# Patient Record
Sex: MALE | Race: WHITE | Employment: OTHER | URBAN - METROPOLITAN AREA
[De-identification: names, ages, dates, MRNs, and addresses within clinical notes are randomized per-mention and may not be internally consistent; named-entity substitution may affect disease eponyms.]

---

## 2017-01-20 PROBLEM — R53.83 FATIGUE: Status: ACTIVE | Noted: 2017-01-20

## 2017-01-23 PROBLEM — Z86.718 HISTORY OF DVT (DEEP VEIN THROMBOSIS): Chronic | Status: ACTIVE | Noted: 2017-01-23

## 2017-01-23 PROBLEM — R31.9 HEMATURIA: Status: ACTIVE | Noted: 2017-01-23

## 2017-02-15 PROBLEM — Z92.89 HISTORY OF ECHOCARDIOGRAM: Status: ACTIVE | Noted: 2017-02-15

## 2017-07-21 PROBLEM — Z79.01 CHRONIC ANTICOAGULATION: Status: ACTIVE | Noted: 2017-07-21

## 2017-07-21 PROBLEM — I48.91 ATRIAL FIBRILLATION (HCC): Status: ACTIVE | Noted: 2017-07-21

## 2017-07-24 PROBLEM — I48.19 PERSISTENT ATRIAL FIBRILLATION (HCC): Status: ACTIVE | Noted: 2017-07-21

## 2018-04-10 ENCOUNTER — OFFICE VISIT (OUTPATIENT)
Dept: CARDIOLOGY CLINIC | Age: 83
End: 2018-04-10
Payer: MEDICARE

## 2018-04-10 VITALS
RESPIRATION RATE: 14 BRPM | DIASTOLIC BLOOD PRESSURE: 80 MMHG | BODY MASS INDEX: 32.37 KG/M2 | SYSTOLIC BLOOD PRESSURE: 118 MMHG | HEART RATE: 74 BPM | WEIGHT: 239 LBS | HEIGHT: 72 IN

## 2018-04-10 DIAGNOSIS — I49.5 SICK SINUS SYNDROME (HCC): Primary | ICD-10-CM

## 2018-04-10 PROCEDURE — 93000 ELECTROCARDIOGRAM COMPLETE: CPT | Performed by: INTERNAL MEDICINE

## 2018-04-10 PROCEDURE — 99214 OFFICE O/P EST MOD 30 MIN: CPT | Performed by: INTERNAL MEDICINE

## 2018-04-10 RX ORDER — FESOTERODINE FUMARATE 4 MG/1
4 TABLET, EXTENDED RELEASE ORAL DAILY
COMMUNITY
End: 2019-06-04

## 2018-11-09 ENCOUNTER — OFFICE VISIT (OUTPATIENT)
Dept: CARDIOLOGY CLINIC | Age: 83
End: 2018-11-09
Payer: MEDICARE

## 2018-11-09 VITALS
HEIGHT: 73 IN | RESPIRATION RATE: 16 BRPM | WEIGHT: 235 LBS | SYSTOLIC BLOOD PRESSURE: 110 MMHG | HEART RATE: 64 BPM | DIASTOLIC BLOOD PRESSURE: 68 MMHG | BODY MASS INDEX: 31.14 KG/M2

## 2018-11-09 DIAGNOSIS — I25.10 CORONARY ARTERY DISEASE INVOLVING NATIVE CORONARY ARTERY OF NATIVE HEART WITHOUT ANGINA PECTORIS: Primary | ICD-10-CM

## 2018-11-09 PROCEDURE — 93000 ELECTROCARDIOGRAM COMPLETE: CPT | Performed by: INTERNAL MEDICINE

## 2018-11-09 PROCEDURE — 99214 OFFICE O/P EST MOD 30 MIN: CPT | Performed by: INTERNAL MEDICINE

## 2018-11-09 NOTE — PROGRESS NOTES
Subjective:      Patient ID: Kia Chacon is a 80 y.o. male. HPI:  See subjective below:      Chief Complaint   Patient presents with    Coronary Artery Disease       Patient Active Problem List    Diagnosis Date Noted    Persistent atrial fibrillation ([facility] [address] 07/21/2017     Overview Note:     MODIFIED MAZE WITH BILATERAL PV ISOLATION, 15M ATRICLIP      Chronic anticoagulation 07/21/2017    History of echocardiogram 02/15/2017     Overview Note:     A. Echo 1/2017 [facility]):  EF 35-45%, moderate LVH, mild AI      History of DVT (deep vein thrombosis) 01/23/2017    Hyperlipidemia with target LDL less than 100 05/31/2013     Overview Note:     replace inactive diagnosis      Essential hypertension 05/31/2013    Diabetes mellitus ([facility] [address] 05/31/2013    Single kidney 05/31/2013    Coronary artery disease involving native coronary artery of native heart 05/31/2013     Overview Note:     A. PCI 2003 - PTCA of LAD 2004 - [place]  B. ACB 7/24/2017:  LIMA-LAD,CRYOVEIN TO PL., CIRCUMFLEX WITH HIGH GRADE LESION AFTER HUGE OM1, OM2 NOT GRAFTED         Current Outpatient Prescriptions   Medication Sig Dispense Refill    tamsulosin (FLOMAX) 0.4 MG capsule Take 0.4 mg by mouth daily       ferrous sulfate 325 (65 Fe) MG tablet Take 325 mg by mouth daily (with breakfast)      metoprolol succinate (TOPROL XL) 25 MG extended release tablet Take 1 tablet by mouth daily 30 tablet 3    warfarin (COUMADIN) 2.5 MG tablet Take 1 tablet by mouth See Admin Instructions Take daily based on daily INR results. Dose to be determined on daily basis by facility medical provider/physician. Check and dose daily until consistently therapeutic. Dx Hx DVTs, Afib.   Goal INR 2-3 30 tablet 3    Multiple Vitamins-Minerals (OCUVITE PO) Take 250 mg by mouth daily      glipiZIDE (GLUCOTROL XL) 5 MG extended release tablet Take 5 mg by mouth daily      PROCTOZONE-HC 2.5 % rectal cream Place 1 each rectally daily as needed for Hemorrhoids      

## 2018-12-17 ENCOUNTER — HOSPITAL ENCOUNTER (OUTPATIENT)
Age: 83
Discharge: HOME | End: 2018-12-19
Payer: MEDICARE

## 2018-12-17 PROCEDURE — 87075 CULTR BACTERIA EXCEPT BLOOD: CPT

## 2018-12-17 PROCEDURE — 87070 CULTURE OTHR SPECIMN AEROBIC: CPT

## 2018-12-19 LAB — WOUND/ABSCESS: NORMAL

## 2018-12-21 LAB — ANAEROBIC CULTURE: NORMAL

## 2019-01-27 ENCOUNTER — HOSPITAL ENCOUNTER (OUTPATIENT)
Age: 84
Discharge: HOME | End: 2019-01-27
Payer: MEDICARE

## 2019-01-27 ENCOUNTER — APPOINTMENT (OUTPATIENT)
Dept: CT IMAGING | Age: 84
End: 2019-01-27
Payer: MEDICARE

## 2019-01-27 ENCOUNTER — HOSPITAL ENCOUNTER (INPATIENT)
Age: 84
LOS: 4 days | Discharge: NURSING, REHAB OR LONG-TERM CARE | DRG: 064 | End: 2019-01-31
Attending: INTERNAL MEDICINE | Admitting: INTERNAL MEDICINE
Payer: MEDICARE

## 2019-01-27 ENCOUNTER — APPOINTMENT (OUTPATIENT)
Dept: GENERAL RADIOLOGY | Age: 84
End: 2019-01-27
Payer: MEDICARE

## 2019-01-27 ENCOUNTER — HOSPITAL ENCOUNTER (EMERGENCY)
Age: 84
Discharge: ANOTHER HOSPITAL | End: 2019-01-27
Attending: EMERGENCY MEDICINE
Payer: MEDICARE

## 2019-01-27 VITALS
SYSTOLIC BLOOD PRESSURE: 136 MMHG | HEART RATE: 42 BPM | TEMPERATURE: 97.6 F | OXYGEN SATURATION: 94 % | RESPIRATION RATE: 16 BRPM | BODY MASS INDEX: 30.48 KG/M2 | HEIGHT: 73 IN | WEIGHT: 230 LBS | DIASTOLIC BLOOD PRESSURE: 63 MMHG

## 2019-01-27 DIAGNOSIS — I77.74 VERTEBRAL ARTERY DISSECTION (HCC): Primary | ICD-10-CM

## 2019-01-27 DIAGNOSIS — R29.90 STROKE-LIKE SYMPTOMS: ICD-10-CM

## 2019-01-27 PROBLEM — I63.9 CEREBROVASCULAR ACCIDENT (CVA) (HCC): Status: ACTIVE | Noted: 2019-01-27

## 2019-01-27 LAB
ALBUMIN SERPL-MCNC: 4.4 G/DL (ref 3.5–5.2)
ALP BLD-CCNC: 37 U/L (ref 40–129)
ALT SERPL-CCNC: 24 U/L (ref 0–40)
ANION GAP SERPL CALCULATED.3IONS-SCNC: 13 MMOL/L (ref 7–16)
APTT: 29.2 SEC (ref 24.5–35.1)
AST SERPL-CCNC: 33 U/L (ref 0–39)
BASOPHILS ABSOLUTE: 0.03 E9/L (ref 0–0.2)
BASOPHILS RELATIVE PERCENT: 0.5 % (ref 0–2)
BILIRUB SERPL-MCNC: 1 MG/DL (ref 0–1.2)
BILIRUBIN DIRECT: 0.3 MG/DL (ref 0–0.3)
BILIRUBIN URINE: NEGATIVE
BILIRUBIN, INDIRECT: 0.7 MG/DL (ref 0–1)
BLOOD, URINE: NEGATIVE
BUN BLDV-MCNC: 22 MG/DL (ref 8–23)
CALCIUM SERPL-MCNC: 9.3 MG/DL (ref 8.6–10.2)
CHLORIDE BLD-SCNC: 102 MMOL/L (ref 98–107)
CLARITY: CLEAR
CO2: 23 MMOL/L (ref 22–29)
COLOR: YELLOW
CREAT SERPL-MCNC: 1.2 MG/DL (ref 0.7–1.2)
EKG ATRIAL RATE: 54 BPM
EKG P AXIS: 48 DEGREES
EKG P-R INTERVAL: 222 MS
EKG Q-T INTERVAL: 482 MS
EKG QRS DURATION: 96 MS
EKG QTC CALCULATION (BAZETT): 457 MS
EKG R AXIS: 39 DEGREES
EKG T AXIS: 28 DEGREES
EKG VENTRICULAR RATE: 54 BPM
EOSINOPHILS ABSOLUTE: 0.27 E9/L (ref 0.05–0.5)
EOSINOPHILS RELATIVE PERCENT: 4.8 % (ref 0–6)
GFR AFRICAN AMERICAN: >60
GFR NON-AFRICAN AMERICAN: 58 ML/MIN/1.73
GLUCOSE BLD-MCNC: 180 MG/DL (ref 74–99)
GLUCOSE URINE: NEGATIVE MG/DL
HCT VFR BLD CALC: 40.6 % (ref 37–54)
HEMOGLOBIN: 13.4 G/DL (ref 12.5–16.5)
IMMATURE GRANULOCYTES #: 0.01 E9/L
IMMATURE GRANULOCYTES %: 0.2 % (ref 0–5)
INR BLD: 1.8
INR BLD: 1.9
KETONES, URINE: 15 MG/DL
LACTIC ACID: 2.2 MMOL/L (ref 0.5–2.2)
LEUKOCYTE ESTERASE, URINE: NEGATIVE
LIPASE: 20 U/L (ref 13–60)
LYMPHOCYTES ABSOLUTE: 1.64 E9/L (ref 1.5–4)
LYMPHOCYTES RELATIVE PERCENT: 29.2 % (ref 20–42)
MCH RBC QN AUTO: 31.1 PG (ref 26–35)
MCHC RBC AUTO-ENTMCNC: 33 % (ref 32–34.5)
MCV RBC AUTO: 94.2 FL (ref 80–99.9)
METER GLUCOSE: 141 MG/DL (ref 74–99)
METER GLUCOSE: 240 MG/DL (ref 74–99)
MONOCYTES ABSOLUTE: 0.44 E9/L (ref 0.1–0.95)
MONOCYTES RELATIVE PERCENT: 7.8 % (ref 2–12)
NEUTROPHILS ABSOLUTE: 3.22 E9/L (ref 1.8–7.3)
NEUTROPHILS RELATIVE PERCENT: 57.5 % (ref 43–80)
NITRITE, URINE: NEGATIVE
PDW BLD-RTO: 14.6 FL (ref 11.5–15)
PH UA: 5.5 (ref 5–9)
PLATELET # BLD: 137 E9/L (ref 130–450)
PMV BLD AUTO: 9.9 FL (ref 7–12)
POTASSIUM REFLEX MAGNESIUM: 4.8 MMOL/L (ref 3.5–5)
PROTEIN UA: NEGATIVE MG/DL
PROTHROMBIN TIME: 20.3 SEC (ref 9.3–12.4)
PROTHROMBIN TIME: 21.6 SEC (ref 9.3–12.4)
RBC # BLD: 4.31 E12/L (ref 3.8–5.8)
SODIUM BLD-SCNC: 138 MMOL/L (ref 132–146)
SPECIFIC GRAVITY UA: 1.02 (ref 1–1.03)
TOTAL PROTEIN: 7.3 G/DL (ref 6.4–8.3)
TROPONIN: <0.01 NG/ML (ref 0–0.03)
TSH SERPL DL<=0.05 MIU/L-ACNC: 2.67 UIU/ML (ref 0.27–4.2)
UROBILINOGEN, URINE: 0.2 E.U./DL
WBC # BLD: 5.6 E9/L (ref 4.5–11.5)

## 2019-01-27 PROCEDURE — 51702 INSERT TEMP BLADDER CATH: CPT

## 2019-01-27 PROCEDURE — 36415 COLL VENOUS BLD VENIPUNCTURE: CPT

## 2019-01-27 PROCEDURE — 84443 ASSAY THYROID STIM HORMONE: CPT

## 2019-01-27 PROCEDURE — 70498 CT ANGIOGRAPHY NECK: CPT

## 2019-01-27 PROCEDURE — 6360000002 HC RX W HCPCS: Performed by: INTERNAL MEDICINE

## 2019-01-27 PROCEDURE — 80048 BASIC METABOLIC PNL TOTAL CA: CPT

## 2019-01-27 PROCEDURE — 2580000003 HC RX 258: Performed by: INTERNAL MEDICINE

## 2019-01-27 PROCEDURE — 99222 1ST HOSP IP/OBS MODERATE 55: CPT | Performed by: PSYCHIATRY & NEUROLOGY

## 2019-01-27 PROCEDURE — 6360000002 HC RX W HCPCS: Performed by: EMERGENCY MEDICINE

## 2019-01-27 PROCEDURE — A0425 GROUND MILEAGE: HCPCS

## 2019-01-27 PROCEDURE — 85610 PROTHROMBIN TIME: CPT

## 2019-01-27 PROCEDURE — 2000000000 HC ICU R&B

## 2019-01-27 PROCEDURE — 71045 X-RAY EXAM CHEST 1 VIEW: CPT

## 2019-01-27 PROCEDURE — 96374 THER/PROPH/DIAG INJ IV PUSH: CPT

## 2019-01-27 PROCEDURE — 82962 GLUCOSE BLOOD TEST: CPT

## 2019-01-27 PROCEDURE — 6360000002 HC RX W HCPCS

## 2019-01-27 PROCEDURE — A0426 ALS 1: HCPCS

## 2019-01-27 PROCEDURE — 81003 URINALYSIS AUTO W/O SCOPE: CPT

## 2019-01-27 PROCEDURE — 83690 ASSAY OF LIPASE: CPT

## 2019-01-27 PROCEDURE — 70496 CT ANGIOGRAPHY HEAD: CPT

## 2019-01-27 PROCEDURE — 85025 COMPLETE CBC W/AUTO DIFF WBC: CPT

## 2019-01-27 PROCEDURE — 2580000003 HC RX 258: Performed by: EMERGENCY MEDICINE

## 2019-01-27 PROCEDURE — 0042T CT BRAIN PERFUSION: CPT

## 2019-01-27 PROCEDURE — 84484 ASSAY OF TROPONIN QUANT: CPT

## 2019-01-27 PROCEDURE — 6370000000 HC RX 637 (ALT 250 FOR IP): Performed by: EMERGENCY MEDICINE

## 2019-01-27 PROCEDURE — 80076 HEPATIC FUNCTION PANEL: CPT

## 2019-01-27 PROCEDURE — 85730 THROMBOPLASTIN TIME PARTIAL: CPT

## 2019-01-27 PROCEDURE — 99285 EMERGENCY DEPT VISIT HI MDM: CPT

## 2019-01-27 PROCEDURE — 93005 ELECTROCARDIOGRAM TRACING: CPT | Performed by: EMERGENCY MEDICINE

## 2019-01-27 PROCEDURE — 70450 CT HEAD/BRAIN W/O DYE: CPT

## 2019-01-27 PROCEDURE — 99291 CRITICAL CARE FIRST HOUR: CPT | Performed by: SURGERY

## 2019-01-27 PROCEDURE — 83605 ASSAY OF LACTIC ACID: CPT

## 2019-01-27 PROCEDURE — 6360000004 HC RX CONTRAST MEDICATION: Performed by: RADIOLOGY

## 2019-01-27 RX ORDER — ASPIRIN 300 MG/1
300 SUPPOSITORY RECTAL ONCE
Status: COMPLETED | OUTPATIENT
Start: 2019-01-27 | End: 2019-01-27

## 2019-01-27 RX ORDER — ONDANSETRON 2 MG/ML
4 INJECTION INTRAMUSCULAR; INTRAVENOUS EVERY 6 HOURS PRN
Status: DISCONTINUED | OUTPATIENT
Start: 2019-01-27 | End: 2019-01-31 | Stop reason: HOSPADM

## 2019-01-27 RX ORDER — ASPIRIN 81 MG/1
81 TABLET, CHEWABLE ORAL DAILY
Status: DISCONTINUED | OUTPATIENT
Start: 2019-01-27 | End: 2019-01-27

## 2019-01-27 RX ORDER — DEXTROSE MONOHYDRATE 25 G/50ML
12.5 INJECTION, SOLUTION INTRAVENOUS PRN
Status: CANCELLED | OUTPATIENT
Start: 2019-01-27

## 2019-01-27 RX ORDER — VITAMIN C
1 TAB ORAL DAILY
Status: DISCONTINUED | OUTPATIENT
Start: 2019-01-27 | End: 2019-01-28

## 2019-01-27 RX ORDER — PANTOPRAZOLE SODIUM 40 MG/1
40 TABLET, DELAYED RELEASE ORAL DAILY
Status: DISCONTINUED | OUTPATIENT
Start: 2019-01-27 | End: 2019-01-28

## 2019-01-27 RX ORDER — SODIUM CHLORIDE 9 MG/ML
INJECTION, SOLUTION INTRAVENOUS CONTINUOUS
Status: DISCONTINUED | OUTPATIENT
Start: 2019-01-27 | End: 2019-01-29

## 2019-01-27 RX ORDER — ONDANSETRON 2 MG/ML
8 INJECTION INTRAMUSCULAR; INTRAVENOUS ONCE
Status: COMPLETED | OUTPATIENT
Start: 2019-01-27 | End: 2019-01-27

## 2019-01-27 RX ORDER — LANOLIN ALCOHOL/MO/W.PET/CERES
400 CREAM (GRAM) TOPICAL DAILY
Status: DISCONTINUED | OUTPATIENT
Start: 2019-01-27 | End: 2019-01-28

## 2019-01-27 RX ORDER — NICOTINE POLACRILEX 4 MG
15 LOZENGE BUCCAL PRN
Status: DISCONTINUED | OUTPATIENT
Start: 2019-01-27 | End: 2019-01-31 | Stop reason: HOSPADM

## 2019-01-27 RX ORDER — DEXTROSE MONOHYDRATE 25 G/50ML
12.5 INJECTION, SOLUTION INTRAVENOUS PRN
Status: DISCONTINUED | OUTPATIENT
Start: 2019-01-27 | End: 2019-01-31 | Stop reason: HOSPADM

## 2019-01-27 RX ORDER — SODIUM CHLORIDE 9 MG/ML
INJECTION, SOLUTION INTRAVENOUS CONTINUOUS
Status: CANCELLED | OUTPATIENT
Start: 2019-01-27

## 2019-01-27 RX ORDER — HEPARIN SODIUM 1000 [USP'U]/ML
30 INJECTION, SOLUTION INTRAVENOUS; SUBCUTANEOUS PRN
Status: DISCONTINUED | OUTPATIENT
Start: 2019-01-27 | End: 2019-01-31 | Stop reason: HOSPADM

## 2019-01-27 RX ORDER — ONDANSETRON 2 MG/ML
4 INJECTION INTRAMUSCULAR; INTRAVENOUS EVERY 6 HOURS PRN
Status: CANCELLED | OUTPATIENT
Start: 2019-01-27

## 2019-01-27 RX ORDER — FERROUS SULFATE 325(65) MG
325 TABLET ORAL
Status: DISCONTINUED | OUTPATIENT
Start: 2019-01-28 | End: 2019-01-28

## 2019-01-27 RX ORDER — ASPIRIN 81 MG/1
324 TABLET, CHEWABLE ORAL ONCE
Status: DISCONTINUED | OUTPATIENT
Start: 2019-01-27 | End: 2019-01-27

## 2019-01-27 RX ORDER — NICOTINE POLACRILEX 4 MG
15 LOZENGE BUCCAL PRN
Status: CANCELLED | OUTPATIENT
Start: 2019-01-27

## 2019-01-27 RX ORDER — ATORVASTATIN CALCIUM 40 MG/1
40 TABLET, FILM COATED ORAL NIGHTLY
Status: CANCELLED | OUTPATIENT
Start: 2019-01-27

## 2019-01-27 RX ORDER — ATORVASTATIN CALCIUM 40 MG/1
40 TABLET, FILM COATED ORAL NIGHTLY
Status: DISCONTINUED | OUTPATIENT
Start: 2019-01-27 | End: 2019-01-28

## 2019-01-27 RX ORDER — SODIUM CHLORIDE 0.9 % (FLUSH) 0.9 %
10 SYRINGE (ML) INJECTION EVERY 12 HOURS SCHEDULED
Status: CANCELLED | OUTPATIENT
Start: 2019-01-27

## 2019-01-27 RX ORDER — DEXTROSE MONOHYDRATE 50 MG/ML
100 INJECTION, SOLUTION INTRAVENOUS PRN
Status: DISCONTINUED | OUTPATIENT
Start: 2019-01-27 | End: 2019-01-31 | Stop reason: HOSPADM

## 2019-01-27 RX ORDER — ENALAPRIL MALEATE 5 MG/1
5 TABLET ORAL DAILY
Status: DISCONTINUED | OUTPATIENT
Start: 2019-01-27 | End: 2019-01-28

## 2019-01-27 RX ORDER — SODIUM CHLORIDE 0.9 % (FLUSH) 0.9 %
10 SYRINGE (ML) INJECTION EVERY 12 HOURS SCHEDULED
Status: DISCONTINUED | OUTPATIENT
Start: 2019-01-27 | End: 2019-01-31 | Stop reason: HOSPADM

## 2019-01-27 RX ORDER — SODIUM CHLORIDE 0.9 % (FLUSH) 0.9 %
10 SYRINGE (ML) INJECTION PRN
Status: CANCELLED | OUTPATIENT
Start: 2019-01-27

## 2019-01-27 RX ORDER — GABAPENTIN 400 MG/1
400 CAPSULE ORAL 3 TIMES DAILY
Status: DISCONTINUED | OUTPATIENT
Start: 2019-01-27 | End: 2019-01-28

## 2019-01-27 RX ORDER — WARFARIN SODIUM 5 MG/1
2.5 TABLET ORAL DAILY
Status: DISCONTINUED | OUTPATIENT
Start: 2019-01-27 | End: 2019-01-28

## 2019-01-27 RX ORDER — ASPIRIN 300 MG/1
300 SUPPOSITORY RECTAL DAILY
Status: DISCONTINUED | OUTPATIENT
Start: 2019-01-28 | End: 2019-01-29

## 2019-01-27 RX ORDER — METOPROLOL SUCCINATE 25 MG/1
25 TABLET, EXTENDED RELEASE ORAL DAILY
Status: DISCONTINUED | OUTPATIENT
Start: 2019-01-27 | End: 2019-01-28

## 2019-01-27 RX ORDER — ASPIRIN 81 MG/1
243 TABLET, CHEWABLE ORAL ONCE
Status: DISCONTINUED | OUTPATIENT
Start: 2019-01-27 | End: 2019-01-27

## 2019-01-27 RX ORDER — HEPARIN SODIUM 10000 [USP'U]/100ML
INJECTION, SOLUTION INTRAVENOUS
Status: COMPLETED
Start: 2019-01-27 | End: 2019-01-27

## 2019-01-27 RX ORDER — DIMETHICONE, OXYBENZONE, AND PADIMATE O 2; 2.5; 6.6 G/100G; G/100G; G/100G
STICK TOPICAL
Status: DISCONTINUED
Start: 2019-01-27 | End: 2019-01-28

## 2019-01-27 RX ORDER — ACETAMINOPHEN 325 MG/1
650 TABLET ORAL EVERY 4 HOURS PRN
Status: CANCELLED | OUTPATIENT
Start: 2019-01-27

## 2019-01-27 RX ORDER — ACETAMINOPHEN 325 MG/1
650 TABLET ORAL EVERY 4 HOURS PRN
Status: DISCONTINUED | OUTPATIENT
Start: 2019-01-27 | End: 2019-01-28

## 2019-01-27 RX ORDER — SODIUM CHLORIDE 0.9 % (FLUSH) 0.9 %
10 SYRINGE (ML) INJECTION PRN
Status: DISCONTINUED | OUTPATIENT
Start: 2019-01-27 | End: 2019-01-31 | Stop reason: HOSPADM

## 2019-01-27 RX ORDER — ASPIRIN 325 MG
325 TABLET ORAL DAILY
Status: CANCELLED | OUTPATIENT
Start: 2019-01-27

## 2019-01-27 RX ORDER — GLIPIZIDE 5 MG/1
5 TABLET ORAL DAILY
Status: DISCONTINUED | OUTPATIENT
Start: 2019-01-27 | End: 2019-01-28

## 2019-01-27 RX ORDER — HEPARIN SODIUM 1000 [USP'U]/ML
60 INJECTION, SOLUTION INTRAVENOUS; SUBCUTANEOUS ONCE
Status: COMPLETED | OUTPATIENT
Start: 2019-01-27 | End: 2019-01-27

## 2019-01-27 RX ORDER — 0.9 % SODIUM CHLORIDE 0.9 %
1000 INTRAVENOUS SOLUTION INTRAVENOUS ONCE
Status: COMPLETED | OUTPATIENT
Start: 2019-01-27 | End: 2019-01-27

## 2019-01-27 RX ORDER — ASPIRIN 325 MG
325 TABLET ORAL DAILY
Status: DISCONTINUED | OUTPATIENT
Start: 2019-01-27 | End: 2019-01-27

## 2019-01-27 RX ORDER — DEXTROSE MONOHYDRATE 50 MG/ML
100 INJECTION, SOLUTION INTRAVENOUS PRN
Status: CANCELLED | OUTPATIENT
Start: 2019-01-27

## 2019-01-27 RX ORDER — POTASSIUM CHLORIDE 750 MG/1
10 TABLET, EXTENDED RELEASE ORAL
Status: DISCONTINUED | OUTPATIENT
Start: 2019-01-27 | End: 2019-01-28

## 2019-01-27 RX ORDER — HEPARIN SODIUM 10000 [USP'U]/100ML
12 INJECTION, SOLUTION INTRAVENOUS CONTINUOUS
Status: DISCONTINUED | OUTPATIENT
Start: 2019-01-27 | End: 2019-01-29

## 2019-01-27 RX ORDER — MECLIZINE HCL 12.5 MG/1
25 TABLET ORAL ONCE
Status: DISCONTINUED | OUTPATIENT
Start: 2019-01-27 | End: 2019-01-27 | Stop reason: HOSPADM

## 2019-01-27 RX ORDER — ASPIRIN 81 MG/1
81 TABLET ORAL DAILY
Status: DISCONTINUED | OUTPATIENT
Start: 2019-01-27 | End: 2019-01-27 | Stop reason: SDUPTHER

## 2019-01-27 RX ORDER — HEPARIN SODIUM 1000 [USP'U]/ML
60 INJECTION, SOLUTION INTRAVENOUS; SUBCUTANEOUS PRN
Status: DISCONTINUED | OUTPATIENT
Start: 2019-01-27 | End: 2019-01-31 | Stop reason: HOSPADM

## 2019-01-27 RX ORDER — TAMSULOSIN HYDROCHLORIDE 0.4 MG/1
0.4 CAPSULE ORAL DAILY
Status: DISCONTINUED | OUTPATIENT
Start: 2019-01-27 | End: 2019-01-28

## 2019-01-27 RX ORDER — TROSPIUM CHLORIDE 20 MG/1
20 TABLET, FILM COATED ORAL DAILY
Status: DISCONTINUED | OUTPATIENT
Start: 2019-01-27 | End: 2019-01-28

## 2019-01-27 RX ADMIN — SODIUM CHLORIDE 1000 ML: 9 INJECTION, SOLUTION INTRAVENOUS at 11:09

## 2019-01-27 RX ADMIN — SODIUM CHLORIDE: 9 INJECTION, SOLUTION INTRAVENOUS at 20:02

## 2019-01-27 RX ADMIN — IOPAMIDOL 100 ML: 755 INJECTION, SOLUTION INTRAVENOUS at 13:13

## 2019-01-27 RX ADMIN — HEPARIN SODIUM AND DEXTROSE 12 UNITS/KG/HR: 10000; 5 INJECTION INTRAVENOUS at 21:09

## 2019-01-27 RX ADMIN — ONDANSETRON 8 MG: 2 INJECTION INTRAMUSCULAR; INTRAVENOUS at 11:21

## 2019-01-27 RX ADMIN — HEPARIN SODIUM 12 UNITS/KG/HR: 10000 INJECTION, SOLUTION INTRAVENOUS at 21:09

## 2019-01-27 RX ADMIN — HEPARIN SODIUM 6420 UNITS: 1000 INJECTION, SOLUTION INTRAVENOUS; SUBCUTANEOUS at 21:05

## 2019-01-27 RX ADMIN — Medication 10 ML: at 20:06

## 2019-01-27 RX ADMIN — ASPIRIN 300 MG: 300 SUPPOSITORY RECTAL at 15:41

## 2019-01-27 ASSESSMENT — ENCOUNTER SYMPTOMS
DIARRHEA: 0
WHEEZING: 0
SHORTNESS OF BREATH: 0
VOMITING: 0
NAUSEA: 1
VISUAL CHANGE: 0
BACK PAIN: 0
CONSTIPATION: 0
ABDOMINAL PAIN: 0
BLOOD IN STOOL: 0
COUGH: 0

## 2019-01-27 ASSESSMENT — PAIN DESCRIPTION - LOCATION: LOCATION: HEAD

## 2019-01-27 ASSESSMENT — PAIN SCALES - GENERAL
PAINLEVEL_OUTOF10: 0

## 2019-01-27 ASSESSMENT — PAIN DESCRIPTION - PAIN TYPE: TYPE: ACUTE PAIN

## 2019-01-27 ASSESSMENT — PAIN DESCRIPTION - FREQUENCY: FREQUENCY: INTERMITTENT

## 2019-01-28 ENCOUNTER — APPOINTMENT (OUTPATIENT)
Dept: GENERAL RADIOLOGY | Age: 84
DRG: 064 | End: 2019-01-28
Attending: INTERNAL MEDICINE
Payer: MEDICARE

## 2019-01-28 ENCOUNTER — APPOINTMENT (OUTPATIENT)
Dept: MRI IMAGING | Age: 84
DRG: 064 | End: 2019-01-28
Attending: INTERNAL MEDICINE
Payer: MEDICARE

## 2019-01-28 PROBLEM — I10 HTN (HYPERTENSION), BENIGN: Chronic | Status: ACTIVE | Noted: 2019-01-28

## 2019-01-28 PROBLEM — I63.219: Status: ACTIVE | Noted: 2019-01-28

## 2019-01-28 PROBLEM — R42 VERTIGO: Status: ACTIVE | Noted: 2019-01-28

## 2019-01-28 PROBLEM — I48.19 PERSISTENT ATRIAL FIBRILLATION (HCC): Chronic | Status: ACTIVE | Noted: 2017-07-21

## 2019-01-28 LAB
ALBUMIN SERPL-MCNC: 3.7 G/DL (ref 3.5–5.2)
ALP BLD-CCNC: 32 U/L (ref 40–129)
ALT SERPL-CCNC: 21 U/L (ref 0–40)
ANION GAP SERPL CALCULATED.3IONS-SCNC: 10 MMOL/L (ref 7–16)
APTT: 53.5 SEC (ref 24.5–35.1)
APTT: 73.6 SEC (ref 24.5–35.1)
APTT: >240 SEC (ref 24.5–35.1)
AST SERPL-CCNC: 27 U/L (ref 0–39)
BASOPHILS ABSOLUTE: 0.02 E9/L (ref 0–0.2)
BASOPHILS RELATIVE PERCENT: 0.3 % (ref 0–2)
BILIRUB SERPL-MCNC: 0.9 MG/DL (ref 0–1.2)
BUN BLDV-MCNC: 21 MG/DL (ref 8–23)
CALCIUM SERPL-MCNC: 9 MG/DL (ref 8.6–10.2)
CHLORIDE BLD-SCNC: 102 MMOL/L (ref 98–107)
CO2: 26 MMOL/L (ref 22–29)
CREAT SERPL-MCNC: 1 MG/DL (ref 0.7–1.2)
EOSINOPHILS ABSOLUTE: 0.05 E9/L (ref 0.05–0.5)
EOSINOPHILS RELATIVE PERCENT: 0.7 % (ref 0–6)
GFR AFRICAN AMERICAN: >60
GFR NON-AFRICAN AMERICAN: >60 ML/MIN/1.73
GLUCOSE BLD-MCNC: 127 MG/DL (ref 74–99)
HCT VFR BLD CALC: 36.3 % (ref 37–54)
HEMOGLOBIN: 12 G/DL (ref 12.5–16.5)
IMMATURE GRANULOCYTES #: 0.02 E9/L
IMMATURE GRANULOCYTES %: 0.3 % (ref 0–5)
INR BLD: 2
LV EF: 60 %
LVEF MODALITY: NORMAL
LYMPHOCYTES ABSOLUTE: 1.23 E9/L (ref 1.5–4)
LYMPHOCYTES RELATIVE PERCENT: 16.2 % (ref 20–42)
MAGNESIUM: 1.7 MG/DL (ref 1.6–2.6)
MCH RBC QN AUTO: 30.8 PG (ref 26–35)
MCHC RBC AUTO-ENTMCNC: 33.1 % (ref 32–34.5)
MCV RBC AUTO: 93.3 FL (ref 80–99.9)
METER GLUCOSE: 113 MG/DL (ref 74–99)
METER GLUCOSE: 120 MG/DL (ref 74–99)
METER GLUCOSE: 137 MG/DL (ref 74–99)
METER GLUCOSE: 139 MG/DL (ref 74–99)
MONOCYTES ABSOLUTE: 0.7 E9/L (ref 0.1–0.95)
MONOCYTES RELATIVE PERCENT: 9.2 % (ref 2–12)
NEUTROPHILS ABSOLUTE: 5.55 E9/L (ref 1.8–7.3)
NEUTROPHILS RELATIVE PERCENT: 73.3 % (ref 43–80)
PDW BLD-RTO: 14.3 FL (ref 11.5–15)
PHOSPHORUS: 2.9 MG/DL (ref 2.5–4.5)
PLATELET # BLD: 145 E9/L (ref 130–450)
PMV BLD AUTO: 10.1 FL (ref 7–12)
POTASSIUM SERPL-SCNC: 4.4 MMOL/L (ref 3.5–5)
PROTHROMBIN TIME: 22.4 SEC (ref 9.3–12.4)
RBC # BLD: 3.89 E12/L (ref 3.8–5.8)
SODIUM BLD-SCNC: 138 MMOL/L (ref 132–146)
TOTAL PROTEIN: 6.3 G/DL (ref 6.4–8.3)
WBC # BLD: 7.6 E9/L (ref 4.5–11.5)

## 2019-01-28 PROCEDURE — 93306 TTE W/DOPPLER COMPLETE: CPT

## 2019-01-28 PROCEDURE — 99233 SBSQ HOSP IP/OBS HIGH 50: CPT | Performed by: NURSE PRACTITIONER

## 2019-01-28 PROCEDURE — 74230 X-RAY XM SWLNG FUNCJ C+: CPT

## 2019-01-28 PROCEDURE — C9113 INJ PANTOPRAZOLE SODIUM, VIA: HCPCS | Performed by: INTERNAL MEDICINE

## 2019-01-28 PROCEDURE — 2500000003 HC RX 250 WO HCPCS: Performed by: INTERNAL MEDICINE

## 2019-01-28 PROCEDURE — 2000000000 HC ICU R&B

## 2019-01-28 PROCEDURE — 85025 COMPLETE CBC W/AUTO DIFF WBC: CPT

## 2019-01-28 PROCEDURE — 92611 MOTION FLUOROSCOPY/SWALLOW: CPT

## 2019-01-28 PROCEDURE — 6370000000 HC RX 637 (ALT 250 FOR IP): Performed by: INTERNAL MEDICINE

## 2019-01-28 PROCEDURE — 36415 COLL VENOUS BLD VENIPUNCTURE: CPT

## 2019-01-28 PROCEDURE — 2700000000 HC OXYGEN THERAPY PER DAY

## 2019-01-28 PROCEDURE — 85610 PROTHROMBIN TIME: CPT

## 2019-01-28 PROCEDURE — 83036 HEMOGLOBIN GLYCOSYLATED A1C: CPT

## 2019-01-28 PROCEDURE — 70551 MRI BRAIN STEM W/O DYE: CPT

## 2019-01-28 PROCEDURE — 83735 ASSAY OF MAGNESIUM: CPT

## 2019-01-28 PROCEDURE — 84100 ASSAY OF PHOSPHORUS: CPT

## 2019-01-28 PROCEDURE — 6360000002 HC RX W HCPCS: Performed by: INTERNAL MEDICINE

## 2019-01-28 PROCEDURE — 82962 GLUCOSE BLOOD TEST: CPT

## 2019-01-28 PROCEDURE — 99222 1ST HOSP IP/OBS MODERATE 55: CPT | Performed by: NEUROLOGICAL SURGERY

## 2019-01-28 PROCEDURE — 85730 THROMBOPLASTIN TIME PARTIAL: CPT

## 2019-01-28 PROCEDURE — 80053 COMPREHEN METABOLIC PANEL: CPT

## 2019-01-28 PROCEDURE — 2580000003 HC RX 258: Performed by: INTERNAL MEDICINE

## 2019-01-28 RX ORDER — PANTOPRAZOLE SODIUM 40 MG/10ML
40 INJECTION, POWDER, LYOPHILIZED, FOR SOLUTION INTRAVENOUS DAILY
Status: DISCONTINUED | OUTPATIENT
Start: 2019-01-28 | End: 2019-01-29

## 2019-01-28 RX ORDER — METOPROLOL TARTRATE 5 MG/5ML
2.5 INJECTION INTRAVENOUS EVERY 8 HOURS
Status: DISCONTINUED | OUTPATIENT
Start: 2019-01-28 | End: 2019-01-29

## 2019-01-28 RX ORDER — ACETAMINOPHEN 650 MG/1
650 SUPPOSITORY RECTAL EVERY 4 HOURS PRN
Status: DISCONTINUED | OUTPATIENT
Start: 2019-01-28 | End: 2019-01-29

## 2019-01-28 RX ADMIN — PANTOPRAZOLE SODIUM 40 MG: 40 INJECTION, POWDER, FOR SOLUTION INTRAVENOUS at 10:30

## 2019-01-28 RX ADMIN — Medication 10 ML: at 20:02

## 2019-01-28 RX ADMIN — SODIUM CHLORIDE: 9 INJECTION, SOLUTION INTRAVENOUS at 08:20

## 2019-01-28 RX ADMIN — BARIUM SULFATE 115 ML: 960 POWDER, FOR SUSPENSION ORAL at 14:10

## 2019-01-28 RX ADMIN — ASPIRIN 300 MG: 300 SUPPOSITORY RECTAL at 10:00

## 2019-01-28 RX ADMIN — SODIUM CHLORIDE: 9 INJECTION, SOLUTION INTRAVENOUS at 21:59

## 2019-01-28 RX ADMIN — BARIUM SULFATE 45 G: 0.6 CREAM ORAL at 14:10

## 2019-01-28 ASSESSMENT — PAIN SCALES - GENERAL
PAINLEVEL_OUTOF10: 0

## 2019-01-28 ASSESSMENT — ENCOUNTER SYMPTOMS
ABDOMINAL PAIN: 0
BACK PAIN: 0
TROUBLE SWALLOWING: 0
SHORTNESS OF BREATH: 0
PHOTOPHOBIA: 0

## 2019-01-29 LAB
APTT: 73.9 SEC (ref 24.5–35.1)
HBA1C MFR BLD: 6 % (ref 4–5.6)
INR BLD: 1.7
METER GLUCOSE: 141 MG/DL (ref 74–99)
METER GLUCOSE: 148 MG/DL (ref 74–99)
METER GLUCOSE: 153 MG/DL (ref 74–99)
METER GLUCOSE: 166 MG/DL (ref 74–99)
PROTHROMBIN TIME: 19.2 SEC (ref 9.3–12.4)

## 2019-01-29 PROCEDURE — 97162 PT EVAL MOD COMPLEX 30 MIN: CPT

## 2019-01-29 PROCEDURE — 2580000003 HC RX 258: Performed by: INTERNAL MEDICINE

## 2019-01-29 PROCEDURE — 6370000000 HC RX 637 (ALT 250 FOR IP): Performed by: INTERNAL MEDICINE

## 2019-01-29 PROCEDURE — 97535 SELF CARE MNGMENT TRAINING: CPT

## 2019-01-29 PROCEDURE — 36415 COLL VENOUS BLD VENIPUNCTURE: CPT

## 2019-01-29 PROCEDURE — 92526 ORAL FUNCTION THERAPY: CPT

## 2019-01-29 PROCEDURE — 6360000002 HC RX W HCPCS: Performed by: INTERNAL MEDICINE

## 2019-01-29 PROCEDURE — 99232 SBSQ HOSP IP/OBS MODERATE 35: CPT | Performed by: NEUROLOGICAL SURGERY

## 2019-01-29 PROCEDURE — 97530 THERAPEUTIC ACTIVITIES: CPT

## 2019-01-29 PROCEDURE — 82962 GLUCOSE BLOOD TEST: CPT

## 2019-01-29 PROCEDURE — 2060000000 HC ICU INTERMEDIATE R&B

## 2019-01-29 PROCEDURE — 99233 SBSQ HOSP IP/OBS HIGH 50: CPT | Performed by: NURSE PRACTITIONER

## 2019-01-29 PROCEDURE — 85730 THROMBOPLASTIN TIME PARTIAL: CPT

## 2019-01-29 PROCEDURE — 85610 PROTHROMBIN TIME: CPT

## 2019-01-29 PROCEDURE — APPSS60 APP SPLIT SHARED TIME 46-60 MINUTES: Performed by: NURSE PRACTITIONER

## 2019-01-29 PROCEDURE — 97166 OT EVAL MOD COMPLEX 45 MIN: CPT

## 2019-01-29 RX ORDER — SIMVASTATIN 40 MG
80 TABLET ORAL NIGHTLY
Status: DISCONTINUED | OUTPATIENT
Start: 2019-01-29 | End: 2019-01-31 | Stop reason: HOSPADM

## 2019-01-29 RX ORDER — TAMSULOSIN HYDROCHLORIDE 0.4 MG/1
0.4 CAPSULE ORAL DAILY
Status: DISCONTINUED | OUTPATIENT
Start: 2019-01-29 | End: 2019-01-31 | Stop reason: HOSPADM

## 2019-01-29 RX ORDER — WARFARIN SODIUM 4 MG/1
4 TABLET ORAL DAILY
Status: DISCONTINUED | OUTPATIENT
Start: 2019-01-29 | End: 2019-01-29

## 2019-01-29 RX ORDER — ASPIRIN 81 MG/1
81 TABLET, CHEWABLE ORAL DAILY
Status: DISCONTINUED | OUTPATIENT
Start: 2019-01-29 | End: 2019-01-29

## 2019-01-29 RX ORDER — PANTOPRAZOLE SODIUM 40 MG/1
40 TABLET, DELAYED RELEASE ORAL DAILY
Status: DISCONTINUED | OUTPATIENT
Start: 2019-01-29 | End: 2019-01-31 | Stop reason: HOSPADM

## 2019-01-29 RX ORDER — METOPROLOL SUCCINATE 25 MG/1
25 TABLET, EXTENDED RELEASE ORAL DAILY
Status: DISCONTINUED | OUTPATIENT
Start: 2019-01-29 | End: 2019-01-31 | Stop reason: HOSPADM

## 2019-01-29 RX ORDER — FERROUS SULFATE 325(65) MG
325 TABLET ORAL
Status: DISCONTINUED | OUTPATIENT
Start: 2019-01-29 | End: 2019-01-31 | Stop reason: HOSPADM

## 2019-01-29 RX ORDER — LANOLIN ALCOHOL/MO/W.PET/CERES
400 CREAM (GRAM) TOPICAL DAILY
Status: DISCONTINUED | OUTPATIENT
Start: 2019-01-29 | End: 2019-01-31 | Stop reason: HOSPADM

## 2019-01-29 RX ORDER — ENALAPRIL MALEATE 5 MG/1
5 TABLET ORAL DAILY
Status: DISCONTINUED | OUTPATIENT
Start: 2019-01-29 | End: 2019-01-31 | Stop reason: HOSPADM

## 2019-01-29 RX ORDER — ACETAMINOPHEN 325 MG/1
650 TABLET ORAL EVERY 4 HOURS PRN
Status: DISCONTINUED | OUTPATIENT
Start: 2019-01-29 | End: 2019-01-31 | Stop reason: HOSPADM

## 2019-01-29 RX ADMIN — ONDANSETRON HYDROCHLORIDE 4 MG: 2 SOLUTION INTRAMUSCULAR; INTRAVENOUS at 13:10

## 2019-01-29 RX ADMIN — INSULIN LISPRO 1 UNITS: 100 INJECTION, SOLUTION INTRAVENOUS; SUBCUTANEOUS at 00:39

## 2019-01-29 RX ADMIN — FERROUS SULFATE TAB 325 MG (65 MG ELEMENTAL FE) 325 MG: 325 (65 FE) TAB at 10:15

## 2019-01-29 RX ADMIN — INSULIN LISPRO 1 UNITS: 100 INJECTION, SOLUTION INTRAVENOUS; SUBCUTANEOUS at 04:34

## 2019-01-29 RX ADMIN — SIMVASTATIN 80 MG: 40 TABLET, FILM COATED ORAL at 22:03

## 2019-01-29 RX ADMIN — MAGNESIUM GLUCONATE 500 MG ORAL TABLET 400 MG: 500 TABLET ORAL at 10:15

## 2019-01-29 RX ADMIN — TAMSULOSIN HYDROCHLORIDE 0.4 MG: 0.4 CAPSULE ORAL at 10:15

## 2019-01-29 RX ADMIN — ENALAPRIL MALEATE 5 MG: 5 TABLET ORAL at 10:15

## 2019-01-29 RX ADMIN — INSULIN LISPRO 2 UNITS: 100 INJECTION, SOLUTION INTRAVENOUS; SUBCUTANEOUS at 22:02

## 2019-01-29 RX ADMIN — METOPROLOL SUCCINATE 25 MG: 25 TABLET, FILM COATED, EXTENDED RELEASE ORAL at 10:15

## 2019-01-29 RX ADMIN — Medication 10 ML: at 13:10

## 2019-01-29 RX ADMIN — PANTOPRAZOLE SODIUM 40 MG: 40 TABLET, DELAYED RELEASE ORAL at 10:15

## 2019-01-29 RX ADMIN — ASPIRIN 81 MG 81 MG: 81 TABLET ORAL at 10:15

## 2019-01-29 RX ADMIN — Medication 10 ML: at 21:47

## 2019-01-29 ASSESSMENT — PAIN SCALES - GENERAL
PAINLEVEL_OUTOF10: 0

## 2019-01-30 LAB
ANION GAP SERPL CALCULATED.3IONS-SCNC: 13 MMOL/L (ref 7–16)
BUN BLDV-MCNC: 20 MG/DL (ref 8–23)
CALCIUM SERPL-MCNC: 8.6 MG/DL (ref 8.6–10.2)
CHLORIDE BLD-SCNC: 99 MMOL/L (ref 98–107)
CHOLESTEROL, TOTAL: 109 MG/DL (ref 0–199)
CO2: 27 MMOL/L (ref 22–29)
CREAT SERPL-MCNC: 1 MG/DL (ref 0.7–1.2)
GFR AFRICAN AMERICAN: >60
GFR NON-AFRICAN AMERICAN: >60 ML/MIN/1.73
GLUCOSE BLD-MCNC: 121 MG/DL (ref 74–99)
HCT VFR BLD CALC: 38.9 % (ref 37–54)
HDLC SERPL-MCNC: 53 MG/DL
HEMOGLOBIN: 12.5 G/DL (ref 12.5–16.5)
LDL CHOLESTEROL CALCULATED: 35 MG/DL (ref 0–99)
MCH RBC QN AUTO: 30.6 PG (ref 26–35)
MCHC RBC AUTO-ENTMCNC: 32.1 % (ref 32–34.5)
MCV RBC AUTO: 95.1 FL (ref 80–99.9)
METER GLUCOSE: 112 MG/DL (ref 74–99)
METER GLUCOSE: 121 MG/DL (ref 74–99)
METER GLUCOSE: 150 MG/DL (ref 74–99)
METER GLUCOSE: 152 MG/DL (ref 74–99)
METER GLUCOSE: 167 MG/DL (ref 74–99)
METER GLUCOSE: 204 MG/DL (ref 74–99)
PDW BLD-RTO: 14.6 FL (ref 11.5–15)
PLATELET # BLD: 144 E9/L (ref 130–450)
PMV BLD AUTO: 10.2 FL (ref 7–12)
POTASSIUM SERPL-SCNC: 3.8 MMOL/L (ref 3.5–5)
RBC # BLD: 4.09 E12/L (ref 3.8–5.8)
SODIUM BLD-SCNC: 139 MMOL/L (ref 132–146)
TRIGL SERPL-MCNC: 105 MG/DL (ref 0–149)
VLDLC SERPL CALC-MCNC: 21 MG/DL
WBC # BLD: 8.8 E9/L (ref 4.5–11.5)

## 2019-01-30 PROCEDURE — 36415 COLL VENOUS BLD VENIPUNCTURE: CPT

## 2019-01-30 PROCEDURE — APPSS60 APP SPLIT SHARED TIME 46-60 MINUTES: Performed by: NURSE PRACTITIONER

## 2019-01-30 PROCEDURE — 99223 1ST HOSP IP/OBS HIGH 75: CPT | Performed by: INTERNAL MEDICINE

## 2019-01-30 PROCEDURE — 2700000000 HC OXYGEN THERAPY PER DAY

## 2019-01-30 PROCEDURE — 92526 ORAL FUNCTION THERAPY: CPT

## 2019-01-30 PROCEDURE — 6370000000 HC RX 637 (ALT 250 FOR IP): Performed by: INTERNAL MEDICINE

## 2019-01-30 PROCEDURE — 85027 COMPLETE CBC AUTOMATED: CPT

## 2019-01-30 PROCEDURE — 82962 GLUCOSE BLOOD TEST: CPT

## 2019-01-30 PROCEDURE — 80061 LIPID PANEL: CPT

## 2019-01-30 PROCEDURE — 97530 THERAPEUTIC ACTIVITIES: CPT

## 2019-01-30 PROCEDURE — 2580000003 HC RX 258: Performed by: INTERNAL MEDICINE

## 2019-01-30 PROCEDURE — 80048 BASIC METABOLIC PNL TOTAL CA: CPT

## 2019-01-30 PROCEDURE — 2060000000 HC ICU INTERMEDIATE R&B

## 2019-01-30 RX ADMIN — INSULIN LISPRO 2 UNITS: 100 INJECTION, SOLUTION INTRAVENOUS; SUBCUTANEOUS at 17:09

## 2019-01-30 RX ADMIN — Medication 10 ML: at 09:48

## 2019-01-30 RX ADMIN — INSULIN LISPRO 2 UNITS: 100 INJECTION, SOLUTION INTRAVENOUS; SUBCUTANEOUS at 20:09

## 2019-01-30 RX ADMIN — INSULIN LISPRO 4 UNITS: 100 INJECTION, SOLUTION INTRAVENOUS; SUBCUTANEOUS at 12:36

## 2019-01-30 RX ADMIN — PANTOPRAZOLE SODIUM 40 MG: 40 TABLET, DELAYED RELEASE ORAL at 05:25

## 2019-01-30 RX ADMIN — FERROUS SULFATE TAB 325 MG (65 MG ELEMENTAL FE) 325 MG: 325 (65 FE) TAB at 09:48

## 2019-01-30 RX ADMIN — TAMSULOSIN HYDROCHLORIDE 0.4 MG: 0.4 CAPSULE ORAL at 09:48

## 2019-01-30 RX ADMIN — METOPROLOL SUCCINATE 25 MG: 25 TABLET, FILM COATED, EXTENDED RELEASE ORAL at 09:48

## 2019-01-30 RX ADMIN — Medication 10 ML: at 20:09

## 2019-01-30 RX ADMIN — ENALAPRIL MALEATE 5 MG: 5 TABLET ORAL at 09:48

## 2019-01-30 RX ADMIN — SIMVASTATIN 80 MG: 40 TABLET, FILM COATED ORAL at 20:09

## 2019-01-30 RX ADMIN — MAGNESIUM GLUCONATE 500 MG ORAL TABLET 400 MG: 500 TABLET ORAL at 09:48

## 2019-01-30 ASSESSMENT — PAIN SCALES - GENERAL
PAINLEVEL_OUTOF10: 0

## 2019-01-31 VITALS
WEIGHT: 231.4 LBS | DIASTOLIC BLOOD PRESSURE: 68 MMHG | OXYGEN SATURATION: 94 % | SYSTOLIC BLOOD PRESSURE: 157 MMHG | TEMPERATURE: 97.6 F | RESPIRATION RATE: 18 BRPM | HEART RATE: 66 BPM | BODY MASS INDEX: 31.34 KG/M2 | HEIGHT: 72 IN

## 2019-01-31 DIAGNOSIS — R42 DIZZINESS: Primary | ICD-10-CM

## 2019-01-31 DIAGNOSIS — I49.5 SINUS NODE DYSFUNCTION (HCC): ICD-10-CM

## 2019-01-31 PROBLEM — I48.19 PERSISTENT ATRIAL FIBRILLATION (HCC): Chronic | Status: RESOLVED | Noted: 2017-07-21 | Resolved: 2019-01-31

## 2019-01-31 LAB
METER GLUCOSE: 119 MG/DL (ref 74–99)
METER GLUCOSE: 213 MG/DL (ref 74–99)

## 2019-01-31 PROCEDURE — 2580000003 HC RX 258: Performed by: INTERNAL MEDICINE

## 2019-01-31 PROCEDURE — 82962 GLUCOSE BLOOD TEST: CPT

## 2019-01-31 PROCEDURE — 97535 SELF CARE MNGMENT TRAINING: CPT

## 2019-01-31 PROCEDURE — 6370000000 HC RX 637 (ALT 250 FOR IP): Performed by: INTERNAL MEDICINE

## 2019-01-31 PROCEDURE — 97530 THERAPEUTIC ACTIVITIES: CPT

## 2019-01-31 PROCEDURE — 92526 ORAL FUNCTION THERAPY: CPT

## 2019-01-31 PROCEDURE — 2700000000 HC OXYGEN THERAPY PER DAY

## 2019-01-31 PROCEDURE — 97110 THERAPEUTIC EXERCISES: CPT

## 2019-01-31 RX ADMIN — Medication 10 ML: at 09:18

## 2019-01-31 RX ADMIN — TAMSULOSIN HYDROCHLORIDE 0.4 MG: 0.4 CAPSULE ORAL at 09:18

## 2019-01-31 RX ADMIN — ENALAPRIL MALEATE 5 MG: 5 TABLET ORAL at 09:14

## 2019-01-31 RX ADMIN — FERROUS SULFATE TAB 325 MG (65 MG ELEMENTAL FE) 325 MG: 325 (65 FE) TAB at 09:14

## 2019-01-31 RX ADMIN — METOPROLOL SUCCINATE 25 MG: 25 TABLET, FILM COATED, EXTENDED RELEASE ORAL at 09:14

## 2019-01-31 RX ADMIN — PANTOPRAZOLE SODIUM 40 MG: 40 TABLET, DELAYED RELEASE ORAL at 06:08

## 2019-01-31 RX ADMIN — INSULIN LISPRO 4 UNITS: 100 INJECTION, SOLUTION INTRAVENOUS; SUBCUTANEOUS at 12:55

## 2019-01-31 RX ADMIN — MAGNESIUM GLUCONATE 500 MG ORAL TABLET 400 MG: 500 TABLET ORAL at 09:14

## 2019-01-31 ASSESSMENT — PAIN SCALES - GENERAL
PAINLEVEL_OUTOF10: 0
PAINLEVEL_OUTOF10: 0

## 2019-02-04 ENCOUNTER — TELEPHONE (OUTPATIENT)
Dept: CARDIOLOGY CLINIC | Age: 84
End: 2019-02-04

## 2019-02-12 ENCOUNTER — TELEPHONE (OUTPATIENT)
Dept: NEUROLOGY | Age: 84
End: 2019-02-12

## 2019-02-13 ENCOUNTER — TELEPHONE (OUTPATIENT)
Dept: NEUROLOGY | Age: 84
End: 2019-02-13

## 2019-02-13 DIAGNOSIS — I63.211 CEREBRAL INFARCTION DUE TO OCCLUSION OF RIGHT VERTEBRAL ARTERY (HCC): Primary | ICD-10-CM

## 2019-02-15 ENCOUNTER — TELEPHONE (OUTPATIENT)
Dept: NEUROLOGY | Age: 84
End: 2019-02-15

## 2019-02-21 ENCOUNTER — HOSPITAL ENCOUNTER (OUTPATIENT)
Dept: CT IMAGING | Age: 84
Discharge: HOME | End: 2019-02-23
Payer: MEDICARE

## 2019-02-21 DIAGNOSIS — I63.211 CEREBRAL INFARCTION DUE TO OCCLUSION OF RIGHT VERTEBRAL ARTERY (HCC): ICD-10-CM

## 2019-02-21 PROCEDURE — 70450 CT HEAD/BRAIN W/O DYE: CPT

## 2019-02-22 ENCOUNTER — TELEPHONE (OUTPATIENT)
Dept: NEUROLOGY | Age: 84
End: 2019-02-22

## 2019-03-20 ENCOUNTER — TELEPHONE (OUTPATIENT)
Dept: CARDIOLOGY CLINIC | Age: 84
End: 2019-03-20

## 2019-03-20 DIAGNOSIS — I49.5 SINUS NODE DYSFUNCTION (HCC): ICD-10-CM

## 2019-03-20 DIAGNOSIS — R42 DIZZINESS: ICD-10-CM

## 2019-04-09 ENCOUNTER — HOSPITAL ENCOUNTER (OUTPATIENT)
Age: 84
Discharge: HOME | End: 2019-04-11
Payer: MEDICARE

## 2019-04-09 ENCOUNTER — HOSPITAL ENCOUNTER (OUTPATIENT)
Dept: GENERAL RADIOLOGY | Age: 84
Discharge: HOME | End: 2019-04-11
Payer: MEDICARE

## 2019-04-09 DIAGNOSIS — R13.12 DYSPHAGIA, OROPHARYNGEAL: ICD-10-CM

## 2019-04-09 PROCEDURE — 92611 MOTION FLUOROSCOPY/SWALLOW: CPT | Performed by: SPEECH-LANGUAGE PATHOLOGIST

## 2019-04-09 PROCEDURE — 74230 X-RAY XM SWLNG FUNCJ C+: CPT

## 2019-04-09 PROCEDURE — 92526 ORAL FUNCTION THERAPY: CPT | Performed by: SPEECH-LANGUAGE PATHOLOGIST

## 2019-04-09 NOTE — PROGRESS NOTES
SPEECH LANGUAGE PATHOLOGY     VIDEOFLUOROSCOPIC STUDY OF SWALLOWING (MBSS)  NAME:  Corinne Pedraza  :  1934  ROOM:  Room/bed info not found DATE: 2019    DYSPHAGIA DIAGNOSIS:  Mild oropharyngeal dysphagia   DIET RECOMMENDATIONS:  Regular consistency solids and thin consistency liquids with a throat clear     THERAPY RECOMMENDATIONS:    [] Repeat swallow study in    [x] Therapy is recommended to:      [] Improve oral motor strength and range of motion    [x] Improve tongue base retraction    [x] Improve laryngeal strength and range of motion   [] Provide Deep Pharyngeal Neuromuscular Stimulation (DPNS)   [] Address cricopharyngeal dysfunction (Shaker Exercises)    [x]  Provide ongoing meal endurance analysis to monitor pt's tolerance for prescribed diet and provide safe swallowing compensatory strategies as appropriate.      [x]Other: education regarding implementation of chin neutral during swallow and throat clear    [] Therapy is not recommended  FEEDING RECOMMENDATIONS: (check all that apply)  Supervision with              PO intake           Eat in upright position          x  Small bites/sips from cup   x         Alternate solids and liquids  x             Check for oral pocketing             Place food on left side           of tongue Place food on right side of tongue No straw              Spoon sip liquids              Liquids via cup              administration     x  Double swallow           x Multiple swallow              Throat clear/swallow    x     Effortful swallow            Idalmis Summersumbcayla water protocol            Romario water             protocol   MEDICATION ADMINISTRATION:  Administer medication as per patient preference           x Administer medication whole,   one at a time, with water                Administer medication whole/crushed in applesauce/pudding            Administer medication via   IV/NG/PEG tube noted   In endurance              EDUCATION/GOAL PLANNING:       Education completed with:       [x] patient     [] family  Speech Pathologist (SLP) completed education with the patient/family regarding type of swallowing impairment. Reviewed current solid/liquid consistency diet recommendations and discussed compensatory strategies to ensure safe PO intake if recommended. Reviewed aspiration precautions. Encouraged pt and/or family to engage SLP in unstructured Q&A session relative to identified deficit areas. Patient indicated understanding of all information provided via satisfactory verbal response. Regarding:     [x] diagnosis     [x] treatment     [x] prognosis     [x] plan of care  [x] Patient was an active participant in goal planning process. [] Patient was unable to participate in goal planning process. [] Goal planning not appropriate at this time as intervention was not recommended. This plan will be re-evaluated and revised in n/a week   if warranted. Prognosis for improvements or maintaining present level of function is:       [x] good          [] fair       [] guarded       [] poor  [x] The admitting diagnosis and active problem list as listed below have been reviewed prior to the initiation of this evaluation.   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

## 2019-04-09 NOTE — PROGRESS NOTES
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 patient educated regarding results of MBS. Reviewed current solid/liquid consistency diet recommendation for Regular consistency solids and thin consistency liquids chin neutral and throat clear, and discussed compensatory strategies to ensure safe PO intake. Reviewed aspiration precautions. Patient and or family  indicated understanding of all information provided via satisfactory verbal response.     Kylah Prabhakar MSCCC/SLP  Speech Language Pathologist  [id number]

## 2019-06-04 ENCOUNTER — OFFICE VISIT (OUTPATIENT)
Dept: NEUROLOGY | Age: 84
End: 2019-06-04
Payer: MEDICARE

## 2019-06-04 VITALS
BODY MASS INDEX: 30.75 KG/M2 | RESPIRATION RATE: 12 BRPM | SYSTOLIC BLOOD PRESSURE: 131 MMHG | DIASTOLIC BLOOD PRESSURE: 70 MMHG | TEMPERATURE: 97 F | HEART RATE: 55 BPM | HEIGHT: 73 IN | OXYGEN SATURATION: 96 % | WEIGHT: 232 LBS

## 2019-06-04 DIAGNOSIS — G46.3 STROKE, WALLENBERG'S SYNDROME: ICD-10-CM

## 2019-06-04 DIAGNOSIS — I63.211 CEREBRAL INFARCTION DUE TO OCCLUSION OF RIGHT VERTEBRAL ARTERY (HCC): Primary | ICD-10-CM

## 2019-06-04 PROCEDURE — 99214 OFFICE O/P EST MOD 30 MIN: CPT | Performed by: NURSE PRACTITIONER

## 2019-06-04 RX ORDER — APIXABAN 5 MG/1
1 TABLET, FILM COATED ORAL 2 TIMES DAILY
Status: ON HOLD | COMMUNITY
Start: 2019-05-23 | End: 2022-06-17 | Stop reason: HOSPADM

## 2019-06-04 RX ORDER — METOPROLOL SUCCINATE 50 MG/1
1 TABLET, EXTENDED RELEASE ORAL DAILY
Refills: 4 | COMMUNITY
Start: 2019-05-07

## 2019-06-04 RX ORDER — MAGNESIUM HYDROXIDE 1200 MG/15ML
SUSPENSION ORAL PRN
Refills: 1 | COMMUNITY
Start: 2019-03-14

## 2019-06-04 RX ORDER — ONDANSETRON 4 MG/1
1 TABLET, ORALLY DISINTEGRATING ORAL EVERY 6 HOURS PRN
Refills: 0 | COMMUNITY
Start: 2019-03-28

## 2019-06-04 RX ORDER — MULTIVIT-MIN/FA/LYCOPEN/LUTEIN .4-300-25
1 TABLET ORAL DAILY
COMMUNITY
End: 2019-12-16

## 2019-06-04 RX ORDER — LOPERAMIDE HYDROCHLORIDE 2 MG/1
1 CAPSULE, LIQUID FILLED ORAL PRN
Refills: 11 | COMMUNITY
Start: 2019-03-25 | End: 2021-11-30 | Stop reason: CLARIF

## 2019-06-04 SDOH — HEALTH STABILITY: MENTAL HEALTH: HOW OFTEN DO YOU HAVE A DRINK CONTAINING ALCOHOL?: MONTHLY OR LESS

## 2019-06-04 NOTE — PROGRESS NOTES
[facility]. Robyn Wilkins M.D., F.A.C.P. Katy Villa, DINA, APRN, CNS  Bogdan Pulido. Cristela Bowles, MSN, APRN-FNPYfnC  Sol Valderrama MSN, APRN, FNP-C  [address] MSN, APRN, FNP-C  [address] [facility]  [place], [address] [address]  Phone: [phone]  Fax: [phone]      Ida Gomez is a 80 y.o. 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 He resides in an correction    He was seen by our service in Jan---there was a tiny amount of hemorrhagic transformation in his stroke and his [address] was held. Repeat CT head in Feb proved resolution of this and he is on Eliquis for hx PE/DVTs as well as statin therapy. He notes occasional drooping of his R eyelid and still notes difficulties with temperature sensation in his L hand only. No other weakness, dysphagia, diplopia, or weakness. He is no longer in therapy. He uses a rolled walker and has had no falls. He tries to stay active but admits he does not walk as much as he should. No bleeding issues. He notes some depression related to his wife, who has Alzheimer's and is in the skilled portion of his facility. He has good family support from his children and remains social. Sleeping well. He denies any memory issues. BP is good here. He wears bilateral ankle orthotics due to hx of ankle pronation.      No chest pain or palpitations  No SOB  No incontinence of bowels or bladder  No itching or bruising appreciated    ROS otherwise negative     Current Outpatient Medications   Medication Sig Dispense Refill    fesoterodine (TOVIAZ) 4 MG TB24 ER tablet Take 4 mg by mouth daily      tamsulosin (FLOMAX) 0.4 MG capsule Take 0.4 mg by mouth daily       ferrous sulfate 325 (65 Fe) MG tablet Take 325 mg by mouth daily (with breakfast)      enalapril (VASOTEC) 5 MG tablet Take 5 mg by mouth daily      acetaminophen (TYLENOL) 325 MG tablet Take 2 tablets by mouth every 4 hours as needed for Pain 120 tablet 3    metoprolol succinate (TOPROL XL) 25 MG extended release tablet Take 1 tablet by mouth daily 30 tablet 3    Multiple Vitamins-Minerals (OCUVITE PO) Take 250 mg by mouth daily      glipiZIDE (GLUCOTROL XL) 5 MG extended release tablet Take 5 mg by mouth daily      Multiple Vitamins-Minerals (CENTRUM SILVER PO) Take 1 tablet by mouth daily Last dose 0/6/14      folic acid-pyridoxine-cyanocobalamine (FOLTX) 2.5-25-1 MG TABS tablet Take 1 tablet by mouth daily Last dose 4/7/17      magnesium oxide (MAG-OX) 400 MG tablet Take 400 mg by mouth daily.  omeprazole (PRILOSEC) 20 MG capsule Take 40 mg by mouth Daily Instructed to take am of procedure      Omega-3-acid Ethyl Esters (OMACOR PO) Take 1 g by mouth 3 times daily Last dose 4/7/17      metformin (GLUCOPHAGE) 500 MG tablet Take 1,000 mg by mouth 2 times daily. Takes 1000 mg twice  A day.  simvastatin (ZOCOR) 80 MG tablet Take 80 mg by mouth nightly. No current facility-administered medications for this visit. Objective:     /70 (Site: Right Upper Arm, Position: Sitting, Cuff Size: Medium Adult)   Pulse 55   Temp 97 °F (36.1 °C) (Oral)   Resp 12   Ht 6' 1\" (1.854 m)   Wt 232 lb (105.2 kg)   SpO2 96%   BMI 30.61 kg/m²     General appearance: alert, appears stated age and cooperative  Head: Normocephalic, without obvious abnormality, atraumatic  Eyes: conjunctivae/corneas clear.   Neck: no carotid bruits  Heart: regular rate and rhythm--no murmur-  Extremities: b/l ankle orthotics; no cyanosis or edema  Pulses: 2+ and symmetric  Skin: no rashes or lesions    Mental Status: Alert, oriented x4--very pleasant    Appropriate attention/concentration  Intact fundus of knowledge  Intact memories    Speech: no dysarthria  Language: no aphasias    Cranial Nerves:  I: smell NA   II: visual acuity Intact at present   II: visual fields Full   II: pupils ROCÍO   III,VII: ptosis Slight R   III,IV,VI: extraocular muscles  Full ROM without nystagmus today   V: mastication Normal   V: facial light touch sensation  Normal    V,VII: corneal reflex     VII: facial muscle function - upper  Normal   VII: facial muscle function - lower Normal   VIII: hearing Normal   IX: soft palate elevation  Normal   IX,X: gag reflex    XI: trapezius strength  5/5   XI: sternocleidomastoid strength 5/5   XI: neck extension strength  5/5   XII: tongue strength  Normal     Motor:  Very minimal L arm drift---5/5 throughout  Normal bulk and tone  No abnormal movements    Sensory:  LT intact  Temperature sensation abnormal L hand    Coordination:   FN, FFM, HS intact b/l    DTR:   BE wrists  1+ biceps  1+ triceps  1+ quads    No Silva's    No pathological reflexes    Laboratory/Radiology:      Ref. Range 1/30/2019 05:15   Cholesterol, Total Latest Ref Range: 0 - 199 mg/dL 109   HDL Cholesterol Latest Ref Range: >40 mg/dL 53   LDL Calculated Latest Ref Range: 0 - 99 mg/dL 35   Triglycerides Latest Ref Range: 0 - 149 mg/dL 105   VLDL Cholesterol Calculated Latest Units: mg/dL 21      Ref. 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  Lifestyle modifications were reviewed including daily physical exercise, healthy diet, sleep hygiene, and stress management.  Stroke s/s reviewed     Plan:     Continue [address] and statin    Stroke s/s reviewed    MRI images reviewed with him at this time    RTO in 6 months or sooner GALLITO Chau, CNP  10:20 AM  6/4/2019

## 2019-06-04 NOTE — PATIENT INSTRUCTIONS
condition or this instruction, always ask your healthcare professional. [place] [address] any warranty or liability for your use of this information. Patient Education        Learning About How to Prevent Another Stroke  What can you do to prevent another stroke? After a stroke, people feel lots of different emotions. Some people are worried that they could have another stroke. Or they may feel overwhelmed by how much there is to learn and do. Some people feel sad or depressed. No matter what emotions you are feeling, you can give yourself some control and peace of mind by making a plan to lower your risk of having another stroke. Take your medicines  You'll need to take medicines to help prevent another stroke. Be sure to take your medicines exactly as prescribed. And don't stop taking them unless your doctor tells you to. If you stop taking your medicines, you can increase your risk of having another stroke. Some of the medicines your doctor may prescribe include:  · Aspirin or some other blood thinner to prevent blood clots. · Statins to lower cholesterol. · Blood pressure medicines to lower blood pressure. Manage other health problems  You can help lower your chance of having another stroke by managing certain other health problems. Problems that increase your risk of having another stroke include:  · High blood pressure. · High cholesterol. · Atrial fibrillation. · Diabetes. If you have any of these health problems, you can manage them with healthy lifestyle changes along with medicine. Adopt a healthy lifestyle  · Do not smoke or allow others to smoke around you. If you need help quitting, talk to your doctor about stop-smoking programs and medicines. These can increase your chances of quitting for good. Smoking makes a stroke more likely. · Limit alcohol to 2 drinks a day for men and 1 drink a day for women. · Lose weight if you need to.  Controlling your weight will help medical condition or this instruction, always ask your healthcare professional. [place] [address] any warranty or liability for your use of this information.

## 2019-06-05 ENCOUNTER — TELEPHONE (OUTPATIENT)
Dept: NEUROLOGY | Age: 84
End: 2019-06-05

## 2019-06-05 NOTE — TELEPHONE ENCOUNTER
Copy of neuro consult faxed through [employer] to Dr. Wayne Marcano.   Electronically signed by Tanya Warren on 6/5/19 at 7:38 AM

## 2019-10-28 ENCOUNTER — HOSPITAL ENCOUNTER (EMERGENCY)
Age: 84
Discharge: HOME | End: 2019-10-28
Attending: EMERGENCY MEDICINE
Payer: MEDICARE

## 2019-10-28 ENCOUNTER — APPOINTMENT (OUTPATIENT)
Dept: GENERAL RADIOLOGY | Age: 84
End: 2019-10-28
Payer: MEDICARE

## 2019-10-28 VITALS
HEART RATE: 67 BPM | HEIGHT: 73 IN | RESPIRATION RATE: 16 BRPM | DIASTOLIC BLOOD PRESSURE: 63 MMHG | BODY MASS INDEX: 29.16 KG/M2 | TEMPERATURE: 98.1 F | WEIGHT: 220 LBS | OXYGEN SATURATION: 95 % | SYSTOLIC BLOOD PRESSURE: 135 MMHG

## 2019-10-28 DIAGNOSIS — E86.0 DEHYDRATION: ICD-10-CM

## 2019-10-28 DIAGNOSIS — N39.0 URINARY TRACT INFECTION WITHOUT HEMATURIA, SITE UNSPECIFIED: Primary | ICD-10-CM

## 2019-10-28 LAB
ANION GAP SERPL CALCULATED.3IONS-SCNC: 14 MMOL/L (ref 7–16)
BACTERIA: ABNORMAL /HPF
BASOPHILS ABSOLUTE: 0.04 E9/L (ref 0–0.2)
BASOPHILS RELATIVE PERCENT: 0.4 % (ref 0–2)
BILIRUBIN URINE: ABNORMAL
BLOOD, URINE: ABNORMAL
BUN BLDV-MCNC: 24 MG/DL (ref 8–23)
CALCIUM SERPL-MCNC: 9.9 MG/DL (ref 8.6–10.2)
CHLORIDE BLD-SCNC: 103 MMOL/L (ref 98–107)
CLARITY: ABNORMAL
CO2: 20 MMOL/L (ref 22–29)
COLOR: YELLOW
CREAT SERPL-MCNC: 1.4 MG/DL (ref 0.7–1.2)
EOSINOPHILS ABSOLUTE: 0.2 E9/L (ref 0.05–0.5)
EOSINOPHILS RELATIVE PERCENT: 1.9 % (ref 0–6)
EPITHELIAL CELLS, UA: ABNORMAL /HPF
GFR AFRICAN AMERICAN: 58
GFR NON-AFRICAN AMERICAN: 48 ML/MIN/1.73
GLUCOSE BLD-MCNC: 181 MG/DL (ref 74–99)
GLUCOSE URINE: NEGATIVE MG/DL
HCT VFR BLD CALC: 39.9 % (ref 37–54)
HEMOGLOBIN: 12.9 G/DL (ref 12.5–16.5)
IMMATURE GRANULOCYTES #: 0.02 E9/L
IMMATURE GRANULOCYTES %: 0.2 % (ref 0–5)
INFLUENZA A BY PCR: NOT DETECTED
INFLUENZA B BY PCR: NOT DETECTED
KETONES, URINE: NEGATIVE MG/DL
LEUKOCYTE ESTERASE, URINE: ABNORMAL
LYMPHOCYTES ABSOLUTE: 1.48 E9/L (ref 1.5–4)
LYMPHOCYTES RELATIVE PERCENT: 13.9 % (ref 20–42)
MCH RBC QN AUTO: 30.9 PG (ref 26–35)
MCHC RBC AUTO-ENTMCNC: 32.3 % (ref 32–34.5)
MCV RBC AUTO: 95.5 FL (ref 80–99.9)
MONOCYTES ABSOLUTE: 1.06 E9/L (ref 0.1–0.95)
MONOCYTES RELATIVE PERCENT: 9.9 % (ref 2–12)
NEUTROPHILS ABSOLUTE: 7.88 E9/L (ref 1.8–7.3)
NEUTROPHILS RELATIVE PERCENT: 73.7 % (ref 43–80)
NITRITE, URINE: NEGATIVE
PDW BLD-RTO: 14.9 FL (ref 11.5–15)
PH UA: 6.5 (ref 5–9)
PLATELET # BLD: 159 E9/L (ref 130–450)
PMV BLD AUTO: 10.3 FL (ref 7–12)
POTASSIUM REFLEX MAGNESIUM: 4.6 MMOL/L (ref 3.5–5)
PROTEIN UA: >=300 MG/DL
RBC # BLD: 4.18 E12/L (ref 3.8–5.8)
RBC UA: ABNORMAL /HPF (ref 0–2)
SODIUM BLD-SCNC: 137 MMOL/L (ref 132–146)
SPECIFIC GRAVITY UA: 1.01 (ref 1–1.03)
UROBILINOGEN, URINE: 0.2 E.U./DL
WBC # BLD: 10.7 E9/L (ref 4.5–11.5)
WBC UA: ABNORMAL /HPF (ref 0–5)

## 2019-10-28 PROCEDURE — 96361 HYDRATE IV INFUSION ADD-ON: CPT

## 2019-10-28 PROCEDURE — 6360000002 HC RX W HCPCS: Performed by: EMERGENCY MEDICINE

## 2019-10-28 PROCEDURE — 80048 BASIC METABOLIC PNL TOTAL CA: CPT

## 2019-10-28 PROCEDURE — 85025 COMPLETE CBC W/AUTO DIFF WBC: CPT

## 2019-10-28 PROCEDURE — 99284 EMERGENCY DEPT VISIT MOD MDM: CPT

## 2019-10-28 PROCEDURE — 81001 URINALYSIS AUTO W/SCOPE: CPT

## 2019-10-28 PROCEDURE — 71045 X-RAY EXAM CHEST 1 VIEW: CPT

## 2019-10-28 PROCEDURE — 2580000003 HC RX 258: Performed by: EMERGENCY MEDICINE

## 2019-10-28 PROCEDURE — 87502 INFLUENZA DNA AMP PROBE: CPT

## 2019-10-28 PROCEDURE — 96365 THER/PROPH/DIAG IV INF INIT: CPT

## 2019-10-28 RX ORDER — CEFDINIR 300 MG/1
300 CAPSULE ORAL 2 TIMES DAILY
Qty: 14 CAPSULE | Refills: 0 | Status: SHIPPED | OUTPATIENT
Start: 2019-10-28 | End: 2019-11-04

## 2019-10-28 RX ORDER — 0.9 % SODIUM CHLORIDE 0.9 %
1000 INTRAVENOUS SOLUTION INTRAVENOUS ONCE
Status: COMPLETED | OUTPATIENT
Start: 2019-10-28 | End: 2019-10-28

## 2019-10-28 RX ADMIN — SODIUM CHLORIDE 1000 ML: 9 INJECTION, SOLUTION INTRAVENOUS at 13:07

## 2019-10-28 RX ADMIN — CEFTRIAXONE 2 G: 2 INJECTION, POWDER, FOR SOLUTION INTRAMUSCULAR; INTRAVENOUS at 14:13

## 2019-10-28 ASSESSMENT — PAIN DESCRIPTION - FREQUENCY: FREQUENCY: CONTINUOUS

## 2019-10-28 ASSESSMENT — ENCOUNTER SYMPTOMS
DIARRHEA: 0
COUGH: 0
EYE DISCHARGE: 0
EYE PAIN: 0
BACK PAIN: 0
SINUS PRESSURE: 0
WHEEZING: 0
NAUSEA: 0
SHORTNESS OF BREATH: 0
VOMITING: 0
ABDOMINAL PAIN: 0
EYE REDNESS: 0
SORE THROAT: 0

## 2019-10-28 ASSESSMENT — PAIN DESCRIPTION - DESCRIPTORS: DESCRIPTORS: BURNING;OTHER (COMMENT)

## 2019-10-28 ASSESSMENT — PAIN DESCRIPTION - ONSET: ONSET: SUDDEN

## 2019-10-28 ASSESSMENT — PAIN SCALES - GENERAL: PAINLEVEL_OUTOF10: 3

## 2019-10-28 ASSESSMENT — PAIN - FUNCTIONAL ASSESSMENT: PAIN_FUNCTIONAL_ASSESSMENT: PREVENTS OR INTERFERES SOME ACTIVE ACTIVITIES AND ADLS

## 2019-10-28 ASSESSMENT — PAIN DESCRIPTION - PAIN TYPE: TYPE: ACUTE PAIN

## 2019-11-04 ENCOUNTER — OFFICE VISIT (OUTPATIENT)
Dept: CARDIOLOGY CLINIC | Age: 84
End: 2019-11-04
Payer: MEDICARE

## 2019-11-04 VITALS
WEIGHT: 230.4 LBS | DIASTOLIC BLOOD PRESSURE: 60 MMHG | RESPIRATION RATE: 16 BRPM | BODY MASS INDEX: 30.54 KG/M2 | SYSTOLIC BLOOD PRESSURE: 112 MMHG | HEART RATE: 53 BPM | HEIGHT: 73 IN

## 2019-11-04 DIAGNOSIS — I48.0 PAF (PAROXYSMAL ATRIAL FIBRILLATION) (HCC): Chronic | ICD-10-CM

## 2019-11-04 DIAGNOSIS — Z92.89 HISTORY OF ECHOCARDIOGRAM: ICD-10-CM

## 2019-11-04 PROCEDURE — 99214 OFFICE O/P EST MOD 30 MIN: CPT | Performed by: INTERNAL MEDICINE

## 2019-11-04 PROCEDURE — 93000 ELECTROCARDIOGRAM COMPLETE: CPT | Performed by: INTERNAL MEDICINE

## 2019-12-16 ENCOUNTER — OFFICE VISIT (OUTPATIENT)
Dept: NEUROLOGY | Age: 84
End: 2019-12-16
Payer: MEDICARE

## 2019-12-16 VITALS
OXYGEN SATURATION: 98 % | WEIGHT: 227 LBS | HEIGHT: 73 IN | DIASTOLIC BLOOD PRESSURE: 66 MMHG | SYSTOLIC BLOOD PRESSURE: 147 MMHG | HEART RATE: 57 BPM | TEMPERATURE: 96.7 F | BODY MASS INDEX: 30.09 KG/M2 | RESPIRATION RATE: 12 BRPM

## 2019-12-16 DIAGNOSIS — I63.211 CEREBRAL INFARCTION DUE TO OCCLUSION OF RIGHT VERTEBRAL ARTERY (HCC): Primary | ICD-10-CM

## 2019-12-16 PROBLEM — Z92.89 HISTORY OF ECHOCARDIOGRAM: Status: RESOLVED | Noted: 2019-11-04 | Resolved: 2019-12-16

## 2019-12-16 PROCEDURE — 99213 OFFICE O/P EST LOW 20 MIN: CPT | Performed by: NURSE PRACTITIONER

## 2019-12-16 RX ORDER — ERYTHROMYCIN 5 MG/G
OINTMENT OPHTHALMIC NIGHTLY
Refills: 2 | COMMUNITY
Start: 2019-11-26 | End: 2020-09-23 | Stop reason: ALTCHOICE

## 2019-12-16 RX ORDER — CARBOXYMETHYLCELLULOSE SODIUM 10 MG/ML
GEL OPHTHALMIC 4 TIMES DAILY
Refills: 12 | COMMUNITY
Start: 2019-11-27

## 2019-12-16 RX ORDER — CEPHALEXIN 500 MG/1
1 CAPSULE ORAL 2 TIMES DAILY
Refills: 0 | COMMUNITY
Start: 2019-12-11 | End: 2020-09-23 | Stop reason: ALTCHOICE

## 2019-12-16 RX ORDER — SULFAMETHOXAZOLE AND TRIMETHOPRIM 800; 160 MG/1; MG/1
1 TABLET ORAL 2 TIMES DAILY
Refills: 0 | COMMUNITY
Start: 2019-12-10 | End: 2020-09-23 | Stop reason: ALTCHOICE

## 2019-12-16 RX ORDER — FLUOROMETHOLONE 0.1 %
1 SUSPENSION, DROPS(FINAL DOSAGE FORM)(ML) OPHTHALMIC (EYE) 2 TIMES DAILY PRN
COMMUNITY

## 2020-01-30 ENCOUNTER — TELEPHONE (OUTPATIENT)
Dept: CARDIOLOGY CLINIC | Age: 85
End: 2020-01-30

## 2020-01-30 NOTE — TELEPHONE ENCOUNTER
Letha Villalobos from [facility], [facility] called on [place] and stated his Nitroglycerin needs a refill. Is this ok.   I do not see this in his chart

## 2020-05-06 ENCOUNTER — VIRTUAL VISIT (OUTPATIENT)
Dept: CARDIOLOGY CLINIC | Age: 85
End: 2020-05-06
Payer: MEDICARE

## 2020-05-06 VITALS
WEIGHT: 235 LBS | HEIGHT: 73 IN | HEART RATE: 55 BPM | SYSTOLIC BLOOD PRESSURE: 128 MMHG | BODY MASS INDEX: 31.14 KG/M2 | DIASTOLIC BLOOD PRESSURE: 70 MMHG

## 2020-05-06 PROCEDURE — 99213 OFFICE O/P EST LOW 20 MIN: CPT | Performed by: INTERNAL MEDICINE

## 2020-05-06 RX ORDER — CIPROFLOXACIN 500 MG/1
500 TABLET, FILM COATED ORAL 2 TIMES DAILY
COMMUNITY
Start: 2020-04-28 | End: 2020-09-23 | Stop reason: ALTCHOICE

## 2020-05-06 RX ORDER — NITROGLYCERIN 0.4 MG/1
TABLET SUBLINGUAL
COMMUNITY
Start: 2020-02-04 | End: 2021-11-30 | Stop reason: CLARIF

## 2020-05-06 NOTE — PROGRESS NOTES
status  [x] Alert and awake  [x] Oriented to person/place/time [x]Able to follow commands      Eyes:  EOM    [x]  Normal  [] Abnormal-  Sclera  [x]  Normal  [] Abnormal -         Discharge [x]  None visible  [] Abnormal -    HENT:   [x] Normocephalic, atraumatic. [] Abnormal   [x] Mouth/Throat: Mucous membranes are moist.     External Ears [x] Normal  [] Abnormal-     Neck: [x] No visualized mass     Pulmonary/Chest: [x] Respiratory effort normal.  [x] No visualized signs of difficulty breathing or respiratory distress        [] Abnormal-      Musculoskeletal:   [] Normal gait with no signs of ataxia         [x] Normal range of motion of neck        [] Abnormal-       Neurological:        [x] No Facial Asymmetry (Cranial nerve 7 motor function) (limited exam to video visit)          [] No gaze palsy        [] Abnormal-         Skin:        [x] No significant exanthematous lesions or discoloration noted on facial skin         [] Abnormal-            Psychiatric:       [x] Normal Affect [] No Hallucinations        [] Abnormal-         ASSESSMENT/PLAN:      HX of atrial fibrillation :  Sinus with 30 day monitor 2019 no afib. On [address]. May need to back down on BB dose if HR slows,     Hyperlipidemia LDL goal < 70: On Zocor    Hypertension: at target    Aortic insufficiency: mild by echo 2019.  Diabetes mellitus    Single kidney    Coronary atherosclerosis of native coronary artery: s/p ACB 2017    DVT (deep venous thrombosis): on life long coumadin        Return in about 3 months (around 8/6/2020). Sheba Garcia is a 80 y.o. male being evaluated by a Virtual Visit (video visit) encounter to address concerns as mentioned above. A caregiver was present when appropriate. Due to this being a TeleHealth encounter ([facility]), evaluation of the following organ systems was limited: Vitals/Constitutional/EENT/Resp/CV/GI//MS/Neuro/Skin/Heme-Lymph-Imm.   Pursuant to the emergency

## 2020-09-22 ENCOUNTER — TELEPHONE (OUTPATIENT)
Dept: ADMINISTRATIVE | Age: 85
End: 2020-09-22

## 2020-09-22 NOTE — TELEPHONE ENCOUNTER
[facility] asking for a 6 mth Virtual apt for pt - was due 10/20/20 - calling to r/s apt. Please call [phone] Joy Jean-Baptiste) if ok for date/time of Virtual apt.

## 2020-09-23 ENCOUNTER — VIRTUAL VISIT (OUTPATIENT)
Dept: NEUROLOGY | Age: 85
End: 2020-09-23
Payer: MEDICARE

## 2020-09-23 PROCEDURE — 99213 OFFICE O/P EST LOW 20 MIN: CPT | Performed by: NURSE PRACTITIONER

## 2020-09-23 RX ORDER — FOLIC ACID-PYRIDOXINE-CYANOCOBALAMIN TAB 2.5-25-2 MG 2.5-25-2 MG
1 TAB ORAL DAILY
COMMUNITY

## 2020-09-23 NOTE — TELEPHONE ENCOUNTER
Apt for 11/30/20 @ 10:30 with OV for [facility] notified.

## 2020-09-23 NOTE — PROGRESS NOTES
[address] [place] [address]. Alba Gaytan M.D., F.A.C.P. Arvind Trujillo, DINA, APRN, CNS  oLwell Ashraf. Cara Ardon, MSN, APRN-FNP-C  Emilia Tsai MSN, APRN, FNP-C  Gilson TORRE PA-C  [address] MSN, APRN, FNP-C  [address] [place] [address]  [facility], [address]  Phone: [phone]  Fax: [phone]          TeleMedicine Video Visit    This visit was performed as a virtual video visit using a synchronous, two-way, audio-video telehealth technology platform. Patient identification was verified at the start of the visit, including the patient's telephone number and physical location. I discussed with the patient the nature of our telehealth visits, that:     1. Due to the nature of an audio- video modality, the only components of a physical exam that could be done are the elements supported by direct observation. 2. I would evaluate the patient and recommend diagnostics and treatments based on my assessment. 3. If it was felt that the patient should be evaluated in clinic or an emergency room setting, then they would be directed there. 4. Our sessions are not being recorded and that personal health information is protected. 5. Our team would provide follow up care in person if/when the patient needs it. Patient does agree to proceed with telemedicine consultation. Patient's location: home address in [facility]  Physician  location other address in [facility]: [facility] office. other people involved in call: F nurse    Time spent: Greater than Not billed by time    This visit was completed virtually using Doxy. jose alfredo SCHULTZ     Mela Araiza is a 80 y.o. 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 He has been doing very well since his last office visit, aside from continued intermittent depression related to his wife being in an Alzheimer's unit.   He did not see her from March to July due to COVID. He denies any diplopia, vertigo, or ptosis. He remains on [facility], with no evidence of bleeding-- and statin therapy is on board. No falls, and he is using a Rollator walker. Unfortunately, he has been \"semi-quarantined\" due to [place] and unable to go to the [facility] for exercise, however he has an arm machine in his room, and tries to exercise for about 1/2-hour every day. Eating and sleeping well. No issues per F nurse.     No chest pain or palpitations  No SOB  No vertigo, lightheadedness or loss of consciousness  No falls, tripping or stumbling  No incontinence of bowels or bladder  No itching or bruising appreciated  No numbness, tingling or focal arm/leg weakness  No speech or swallowing troubles    ROS otherwise negative     Current Outpatient Medications   Medication Sig Dispense Refill    folic acid-pyridoxine-cyancobalamin (FOLBIC) 2.5-25-2 MG TABS Take 1 tablet by mouth daily      nitroGLYCERIN (NITROSTAT) 0.4 MG SL tablet       Multiple Vitamins-Minerals (CEROVITE SENIOR PO) Take by mouth Daily      REFRESH LIQUIGEL 1 % ophthalmic gel Place into the right eye 4 times daily  12    PROCTOZONE-HC 2.5 % rectal cream   3    psyllium 0.52 g capsule Take 3 capsules by mouth 2 times daily  11    fluorometholone (FML) 0.1 % ophthalmic suspension Place 1 drop into the right eye 2 times daily as needed      Propylene Glycol (SYSTANE BALANCE) 0.6 % SOLN Apply to eye as needed      ANTI-DIARRHEAL 2 MG capsule Take 1 capsule by mouth as needed  11    ELIQUIS 5 MG TABS tablet Take 1 tablet by mouth 2 times daily      MILK OF MAGNESIA 400 MG/5ML suspension as needed  1    ondansetron (ZOFRAN-ODT) 4 MG disintegrating tablet Take 1 tablet by mouth every 6 hours as needed  0    PREPARATION H 1-0.25-14.4-15 % CREA rectal cream Place rectally as needed  11    metoprolol succinate (TOPROL XL) 50 MG extended release tablet Take 1 tablet by mouth daily  4    tamsulosin (FLOMAX) 0.4 MG capsule Take 0.4 mg by mouth daily       ferrous sulfate 325 (65 Fe) MG tablet Take 325 mg by mouth daily (with breakfast)      enalapril (VASOTEC) 5 MG tablet Take 5 mg by mouth daily      acetaminophen (TYLENOL) 325 MG tablet Take 2 tablets by mouth every 4 hours as needed for Pain 120 tablet 3    Multiple Vitamins-Minerals (OCUVITE PO) Take 250 mg by mouth daily      glipiZIDE (GLUCOTROL XL) 5 MG extended release tablet Take 5 mg by mouth daily      magnesium oxide (MAG-OX) 400 MG tablet Take 400 mg by mouth daily.  omeprazole (PRILOSEC) 40 MG delayed release capsule Take 40 mg by mouth Daily Instructed to take am of procedure      Omega-3-acid Ethyl Esters (OMACOR PO) Take 1 g by mouth 3 times daily Last dose 4/7/17      metFORMIN (GLUCOPHAGE) 1000 MG tablet Take 1,000 mg by mouth 2 times daily Takes 1000 mg twice  A day.  simvastatin (ZOCOR) 80 MG tablet Take 80 mg by mouth nightly. No current facility-administered medications for this visit.       Objective:     General appearance: alert, appears stated age, cooperative and no distress sitting in chair in ECF  Head: appears normal  Eyes: appears normal  Neck: full active ROM  Breathing effort appears normal and nonlabored  Extremities: UEs appears normal and without cyanosis or edema  Skin: no obvious lesions on face or UEs    Mental Status: alert, oriented x4--very pleasant    Appropriate attention/concentration  Intact fundus of knowledge  Memories intact    Speech: no dysarthria  Language: no aphasias    Cranial Nerves:  I: smell    II: visual acuity     II: visual fields    II: pupils Appear symmetric    ECF nurse states \"PERRLA is good\"   III,VII: ptosis None   III,IV,VI: extraocular muscles  EOMI without nystagmus    V: mastication    V: facial light touch sensation     V,VII: corneal reflex     VII: facial muscle function - upper  Normal   VII: facial muscle function - lower Normal   VIII: hearing Normal   IX: soft palate elevation     IX,X: gag reflex    XI: trapezius strength     XI: sternocleidomastoid strength    XI: neck extension strength     XII: tongue strength  Normal     Motor: Movements appear symmetric--no drift  Normal bulk   No abnormal movements    Coordination:   FN, FFM and NINOSKA normal    Gait:  Steady with Rollator walker    Laboratory/Radiology:     No current labs or images to review    Assessment:     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 fib. He has no obvious focal deficits on his limited video visit, and has made an excellent recovery. I still suspect he suffers from depression related to his stroke, and his wife's Alzheimer's, but this does not appear to be severe. His incidental R BG arachnoid cyst is not contributing clinically.     Plan:     Continue [facility] and statin    Patient declining SSRI    Stroke signs and symptoms reviewed    ECF to call with any questions or concerns    RTO in 6 months or sooner PRN--hopefully in person    GALLITO Allison, CNP  8:06 AM  9/23/2020

## 2020-09-23 NOTE — PATIENT INSTRUCTIONS
Patient Education        Learning About FAST: Stroke Warning Signs  What is FAST? FAST is a simple way to remember the main symptoms of stroke. Recognizing these symptoms helps you know when to call for medical help. FAST stands for:  · F ace drooping. · A rm weakness. · S peech difficulty. · T violette to call 911. Other stroke symptoms can include sudden confusion, a severe headache, and problems with vision or balance. What happens when you have a stroke? A stroke occurs when a blood vessel to the brain bursts or is blocked by a blood clot. Within minutes, the nerve cells in that part of the brain die. As a result, the part of the body controlled by those cells cannot work properly. The effects of a stroke may range from mild to severe. They may get better, or they may last the rest of your life. A stroke can affect vision, speech, behavior, thought processes, and your ability to move. It can cause symptoms that may include:  · Sudden numbness, tingling, weakness, or loss of movement in your face, arm, or leg, especially on only one side of your body. · Sudden vision changes. · Sudden trouble speaking. · Sudden confusion or trouble understanding simple statements. · Sudden problems with walking or balance. · A sudden, severe headache that is different from past headaches. Why is it important to get help FAST? Quick treatment may save your life. And it may reduce the damage in your brain so that you have fewer problems after the stroke. When you know stroke symptoms, you will know when it's important to call for medical help. Where can you learn more? Go to https://Yummy [email or web address].[email or web address]. org and sign in to your OpenBuildings account. Enter Q735 in the Workana box to learn more about \"Learning About FAST: Stroke Warning Signs. \"     If you do not have an account, please click on the \"Sign Up Now\" link.   Current as of: March 4, 2020               Content Version: 12.5  © 7894-9482 Healthwise, Incorporated. Care instructions adapted under license by [facility] ([facility]). If you have questions about a medical condition or this instruction, always ask your healthcare professional. [address] any warranty or liability for your use of this information.

## 2020-09-23 NOTE — PROGRESS NOTES
Alis Walker was read the following message We want to confirm that, for purposes of billing, this is a virtual visit with your provider for which we will submit a claim for reimbursement with your insurance company. You will be responsible for any copays, coinsurance amounts or other amounts not covered by your insurance company. If you do not accept this, unfortunately we will not be able to schedule a virtual visit with the provider. Do you accept? Marianne responded Yes .

## 2020-10-08 ENCOUNTER — HOSPITAL ENCOUNTER (OUTPATIENT)
Age: 85
Discharge: HOME | End: 2020-10-10
Payer: MEDICARE

## 2020-10-08 PROCEDURE — 87077 CULTURE AEROBIC IDENTIFY: CPT

## 2020-10-08 PROCEDURE — 87186 SC STD MICRODIL/AGAR DIL: CPT

## 2020-10-08 PROCEDURE — 87088 URINE BACTERIA CULTURE: CPT

## 2020-10-10 LAB
ORGANISM: ABNORMAL
URINE CULTURE, ROUTINE: ABNORMAL

## 2020-11-30 ENCOUNTER — OFFICE VISIT (OUTPATIENT)
Dept: CARDIOLOGY CLINIC | Age: 85
End: 2020-11-30
Payer: MEDICARE

## 2020-11-30 VITALS
SYSTOLIC BLOOD PRESSURE: 112 MMHG | BODY MASS INDEX: 30.51 KG/M2 | DIASTOLIC BLOOD PRESSURE: 66 MMHG | RESPIRATION RATE: 18 BRPM | WEIGHT: 230.2 LBS | HEART RATE: 57 BPM | HEIGHT: 73 IN

## 2020-11-30 PROCEDURE — 99214 OFFICE O/P EST MOD 30 MIN: CPT | Performed by: INTERNAL MEDICINE

## 2020-11-30 PROCEDURE — 93000 ELECTROCARDIOGRAM COMPLETE: CPT | Performed by: INTERNAL MEDICINE

## 2020-11-30 RX ORDER — DIPHENHYDRAMINE HCL 25 MG
25 CAPSULE ORAL EVERY 6 HOURS PRN
COMMUNITY

## 2020-11-30 NOTE — PROGRESS NOTES
Subjective:      Patient ID: Adriana Jean is a 80 y.o. male. HPI:  See subjective below:      Chief Complaint   Patient presents with    Coronary Artery Disease    Atrial Fibrillation       Patient Active Problem List    Diagnosis Date Noted    PAF (paroxysmal atrial fibrillation) ([facility]      Overview Note:     A. Maze and atriclip  B. 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 PCI 2003 - PTCA of LAD 2004 - [place]  B.  ACB 7/24/2017:  LIMA-LAD,CRYOVEIN TO PL., CIRCUMFLEX WITH HIGH GRADE LESION AFTER HUGE OM1, OM2 NOT GRAFTED         Current Outpatient Medications   Medication Sig Dispense Refill    diphenhydrAMINE (BENADRYL) 25 MG capsule Take 25 mg by mouth every 6 hours as needed for Itching      folic acid-pyridoxine-cyancobalamin (FOLBIC) 2.5-25-2 MG TABS Take 1 tablet by mouth daily      nitroGLYCERIN (NITROSTAT) 0.4 MG SL tablet       Multiple Vitamins-Minerals (CEROVITE SENIOR PO) Take by mouth Daily      REFRESH LIQUIGEL 1 % ophthalmic gel Place into the right eye 4 times daily  12    PROCTOZONE-HC 2.5 % rectal cream   3    psyllium 0.52 g capsule Take 3 capsules by mouth 2 times daily  11    fluorometholone (FML) 0.1 % ophthalmic suspension Place 1 drop into the right eye 2 times daily as needed      Propylene Glycol (SYSTANE BALANCE) 0.6 % SOLN Apply to eye as needed      ANTI-DIARRHEAL 2 MG capsule Take 1 capsule by mouth as needed  11    ELIQUIS 5 MG TABS tablet Take 1 tablet by mouth 2 times daily      MILK OF MAGNESIA 400 MG/5ML suspension as needed  1    ondansetron (ZOFRAN-ODT) 4 MG disintegrating tablet Take 1 tablet by mouth every 6 hours as needed  0    PREPARATION H 1-0.25-14.4-15 % CREA rectal cream Place rectally as needed  11    metoprolol succinate (TOPROL XL) 50 MG extended release tablet Take 1 tablet by mouth daily  4    tamsulosin (FLOMAX) 0.4 MG capsule Take 0.4 mg by mouth daily       ferrous sulfate 325 (65 Fe) MG tablet Take 325 mg by mouth daily (with breakfast)      enalapril (VASOTEC) 5 MG tablet Take 5 mg by mouth daily      acetaminophen (TYLENOL) 325 MG tablet Take 2 tablets by mouth every 4 hours as needed for Pain 120 tablet 3    Multiple Vitamins-Minerals (OCUVITE PO) Take 250 mg by mouth daily      glipiZIDE (GLUCOTROL XL) 5 MG extended release tablet Take 5 mg by mouth daily      magnesium oxide (MAG-OX) 400 MG tablet Take 400 mg by mouth daily.  omeprazole (PRILOSEC) 40 MG delayed release capsule Take 40 mg by mouth Daily Instructed to take am of procedure      Omega-3-acid Ethyl Esters (OMACOR PO) Take 1 g by mouth 3 times daily Last dose 4/7/17      metFORMIN (GLUCOPHAGE) 1000 MG tablet Take 1,000 mg by mouth 2 times daily Takes 1000 mg twice  A day.  simvastatin (ZOCOR) 80 MG tablet Take 80 mg by mouth nightly. No current facility-administered medications for this visit.          No Known Allergies    Vitals:    11/30/20 1033   BP: 112/66   Pulse: 57   Resp: 18   Weight: 230 lb 3.2 oz (104.4 kg)   Height: 6' 1\" (1.854 m)       Social History     Socioeconomic History    Marital status:      Spouse name: Not on file    Number of children: Not on file    Years of education: Not on file    Highest education level: Not on file   Occupational History    Not on file   Social Needs    Financial resource strain: Not on file    Food insecurity     Worry: Not on file     Inability: Not on file    Transportation needs     Medical: Not on file     Non-medical: Not on file   Tobacco Use    Smoking status: Former Smoker     Packs/day: 1.00     Years: 10.00     Pack years: 10.00     Types: Cigarettes     Last attempt to quit: 1/1/1964     Years since quitting: 56.9    Smokeless tobacco: Never Used   Substance and Sexual Activity    Alcohol use: Yes     Frequency: Monthly or less     Comment: social    Drug use: No    Sexual activity: Not on file   Lifestyle    Physical activity     Days per week: Not on file     Minutes per session: Not on file    Stress: Not on file   Relationships    Social connections     Talks on phone: Not on file     Gets together: Not on file     Attends Caodaism service: Not on file     Active member of club or organization: Not on file     Attends meetings of clubs or organizations: Not on file     Relationship status: Not on file    Intimate partner violence     Fear of current or ex partner: Not on file     Emotionally abused: Not on file     Physically abused: Not on file     Forced sexual activity: Not on file   Other Topics Concern    Not on file   Social History Narrative    Not on file       Family History   Problem Relation Age of Onset    Heart Disease Father        SUBJECTIVE: Ankur Fisher presents to the office today for re-evaluation of  chronic cardiac diagnoses. Since our last visit he had a vertebral artery territory CVA leaving him with balance issues. Now In Assisted Living with a walker. Had normal echo and 30 day monitor at time of CVA. He DENIES chest pain, sob , orthopnea, palpitations, paroxysmal nocturnal dyspnea and syncope or recent decline in functional capacity or symptoms compatible with thromboembolic phenomena. Is dismayed about balance issues from ankles and neuropathy. No bleeds with NOAC. Review of Systems:  Negative 10 system review other than stated above. No bleeding episodes. Objective:   Physical Exam   Constitutional: He is oriented to person, place, and time. overweight. He is cooperative. See vitals above. HENT:   Head: Normocephalic and atraumatic. Normal lips, teeth, gums, oral mucosa wet.     Eyes: Conjunctivae are normal. Pupils are equal, round,  Neck: No  JVD present. Cardiovascular: irregular rhythm, S1 normal, S2 normal, intact distal pulses and normal pulses. PMI is not displaced. Exam reveals no gallop. No murmur heard. Carotid bruit is not present   Pulmonary/Chest: Effort normal and breath sounds normal. No respiratory distress. Abdominal: Soft. Normal appearance and bowel sounds are normal. He exhibits no abdominal bruit and no pulsatile midline mass. There is no hepatosplenomegaly. There is no tenderness. Musculoskeletal: He exhibits trace edema  Uses walker  Gait normal   Neurological: He is alert and oriented to person, place, and time. Gait normal.   Skin: Skin is warm and dry. No bruising, no ecchymosis and no rash noted. Rash is not nodular. He is not diaphoretic. No cyanosis. Psychiatric: He has a normal mood and affect. His behavior is normal. Thought content normal.     EKG: sinus bradycardia , normal     ASSESSMENT & PLAN:    Patient Active Problem List   Diagnosis    HX of atrial fibrillation :    On NOAC. Will research BMP - if creat > 1.5 will need to back down on dose of eliquis to 2.5 mg bid.  Hyperlipidemia LDL goal < 70: On Zocor    Hypertension: at target    Aortic insufficiency: mild by echo 2019.   No murmur today    Diabetes mellitus    Single kidney    Coronary atherosclerosis of native coronary artery: s/p ACB 2017    DVT (deep venous thrombosis): on life long coumadin     OV 1 year

## 2021-03-16 NOTE — PROGRESS NOTES
[address]. Susan iRck M.D., F.A.C.P. Danika Ibrahim, DNP, APRN, CNS  [facility]. Rj Lamar, MSN, APRN-FNP-C  Anuj Hopson MSN, APRN, FNP-C  Lev TORRE, TERRY  [address] MSN, APRN, FNP-C  [address], [address]  Phone: [phone]  Fax: [phone]      Tamiko Moraes is a 80 y.o. 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 He continues to reside in an assisted living facility and has been doing well. Less depressed since being able to be more social following Covid vaccination. Continues to reside in the corresponding Alzheimer's unit at the facility. Occasionally having diplopia with lateral gaze but no other new neurologic symptoms. Intermittent R eyelid drooping. Clumsier on his right foot side and continues to use a walker. No falls and he has been driving short distanceshe apparently took a driving exam in past.    Eating and sleeping well. Remains on [facility] and statin.     Current Outpatient Medications   Medication Sig Dispense Refill    diphenhydrAMINE (BENADRYL) 25 MG capsule Take 25 mg by mouth every 6 hours as needed for Itching      folic acid-pyridoxine-cyancobalamin (FOLBIC) 2.5-25-2 MG TABS Take 1 tablet by mouth daily      nitroGLYCERIN (NITROSTAT) 0.4 MG SL tablet       Multiple Vitamins-Minerals (CEROVITE SENIOR PO) Take by mouth Daily      REFRESH LIQUIGEL 1 % ophthalmic gel Place into the right eye 4 times daily  12    PROCTOZONE-HC 2.5 % rectal cream   3    psyllium 0.52 g capsule Take 3 capsules by mouth 2 times daily  11    fluorometholone (FML) 0.1 % ophthalmic suspension Place 1 drop into the right eye 2 times daily as needed      Propylene Glycol (SYSTANE BALANCE) 0.6 % SOLN Apply to eye as needed      ANTI-DIARRHEAL 2 MG capsule Take 1 capsule by mouth as needed  11    braces  Pulses: 2+ throughout  Skin: no rashes or lesions    Mental Status: alert, oriented x4--very pleasant    Appropriate attention/concentration  Intact fundus of knowledge  Memories intact    Speech: no dysarthria  Language: no aphasias    Cranial Nerves:  I: smell    II: visual acuity   binocular diplopia with right lateral gaze only   II: visual fields  full   II: pupils  PERRLA     III,VII: ptosis  slight R today   III,IV,VI: extraocular muscles  EOMI without nystagmus    V: mastication  normal   V: facial light touch sensation   normal   V,VII: corneal reflex     VII: facial muscle function - upper  Normal   VII: facial muscle function - lower Normal   VIII: hearing Normal   IX: soft palate elevation   normal   IX,X: gag reflex    XI: trapezius strength   5/5   XI: sternocleidomastoid strength  5/5   XI: neck extension strength   5/5   XII: tongue strength  Normal     Motor:  5/5 throughout  Normal bulk; mild right-sided spasticity  No drift  No abnormal movements    Sensory:  LT symmetric in all limbs  Temp sensation intact in all limbs    Coordination:   FN, FFM, NINOSKA slightly slower on R but not ataxic    Gait:  Drifts to the R at times when not using walker; Wide based and looks at the floor when he walks  Steadier with a walker    DTR:  1+ arms  BE legs    Laboratory/Radiology:     Labs reviewed under media tab November 2020  H&H 12.5/37.4  Creatinine 1.4  LDL 12    Assessment:     R lateral medullary stroke: secondary to R VA occlusion in a patient with A. Fib.  Exam is stable today     A. fib: On [facility]    History colon cancer    History of PE    Plan:     Continue [facility] and statin    Fall precautions reviewed    Stroke signs and symptoms reviewed    Increase physical exercise    RTO in 6 months or sooner SAÚL Edwards, APRN, CNP  12:52 PM  3/16/2021

## 2021-03-17 ENCOUNTER — OFFICE VISIT (OUTPATIENT)
Dept: NEUROLOGY | Age: 86
End: 2021-03-17
Payer: MEDICARE

## 2021-03-17 VITALS
OXYGEN SATURATION: 96 % | WEIGHT: 227 LBS | TEMPERATURE: 97.1 F | BODY MASS INDEX: 30.09 KG/M2 | HEART RATE: 55 BPM | DIASTOLIC BLOOD PRESSURE: 73 MMHG | RESPIRATION RATE: 12 BRPM | SYSTOLIC BLOOD PRESSURE: 153 MMHG | HEIGHT: 73 IN

## 2021-03-17 DIAGNOSIS — G46.4 LATERAL MEDULLARY SYNDROME: ICD-10-CM

## 2021-03-17 DIAGNOSIS — I63.211 CEREBRAL INFARCTION DUE TO OCCLUSION OF RIGHT VERTEBRAL ARTERY (HCC): Primary | ICD-10-CM

## 2021-03-17 PROBLEM — I63.219: Status: RESOLVED | Noted: 2019-01-28 | Resolved: 2021-03-17

## 2021-03-17 PROCEDURE — 99213 OFFICE O/P EST LOW 20 MIN: CPT | Performed by: NURSE PRACTITIONER

## 2021-03-17 NOTE — PATIENT INSTRUCTIONS
Patient Education        Learning About FAST: Stroke Warning Signs  What is FAST? FAST is a simple way to remember the main symptoms of stroke. Recognizing these symptoms helps you know when to call for medical help. FAST stands for:  · F ace drooping. · A rm weakness. · S peech difficulty. · T violette to call 911. Other stroke symptoms can include sudden confusion, a severe headache, and problems with vision or balance. What happens when you have a stroke? A stroke occurs when a blood vessel to the brain bursts or is blocked by a blood clot. Within minutes, the nerve cells in that part of the brain die. As a result, the part of the body controlled by those cells cannot work properly. The effects of a stroke may range from mild to severe. They may get better, or they may last the rest of your life. A stroke can affect vision, speech, behavior, thought processes, and your ability to move. It can cause symptoms that may include:  · Sudden numbness, tingling, weakness, or loss of movement in your face, arm, or leg, especially on only one side of your body. · Sudden vision changes. · Sudden trouble speaking. · Sudden confusion or trouble understanding simple statements. · Sudden problems with walking or balance. · A sudden, severe headache that is different from past headaches. Why is it important to get help FAST? Quick treatment may save your life. And it may reduce the damage in your brain so that you have fewer problems after the stroke. When you know stroke symptoms, you will know when it's important to call for medical help. Where can you learn more? Go to https://Wylepe[employer]az [employer] [email or web address]. org and sign in to your kalidea account. Enter V299 in the [facility] box to learn more about \"Learning About FAST: Stroke Warning Signs. \"     If you do not have an account, please click on the \"Sign Up Now\" link.   Current as of: March 4, 2020               Content Version: 12.8  © 7780-5461 Healthwise, Incorporated. Care instructions adapted under license by [facility] ([facility]). If you have questions about a medical condition or this instruction, always ask your healthcare professional. [address] any warranty or liability for your use of this information.

## 2021-11-30 ENCOUNTER — OFFICE VISIT (OUTPATIENT)
Dept: CARDIOLOGY CLINIC | Age: 86
End: 2021-11-30
Payer: MEDICARE

## 2021-11-30 VITALS
BODY MASS INDEX: 32.34 KG/M2 | SYSTOLIC BLOOD PRESSURE: 109 MMHG | HEIGHT: 73 IN | DIASTOLIC BLOOD PRESSURE: 54 MMHG | WEIGHT: 244 LBS | HEART RATE: 51 BPM | RESPIRATION RATE: 12 BRPM

## 2021-11-30 DIAGNOSIS — I48.0 PAF (PAROXYSMAL ATRIAL FIBRILLATION) (HCC): Chronic | ICD-10-CM

## 2021-11-30 DIAGNOSIS — I25.10 CORONARY ARTERY DISEASE INVOLVING NATIVE CORONARY ARTERY OF NATIVE HEART WITHOUT ANGINA PECTORIS: Primary | ICD-10-CM

## 2021-11-30 PROCEDURE — 93000 ELECTROCARDIOGRAM COMPLETE: CPT | Performed by: INTERNAL MEDICINE

## 2021-11-30 PROCEDURE — 99214 OFFICE O/P EST MOD 30 MIN: CPT | Performed by: INTERNAL MEDICINE

## 2021-11-30 RX ORDER — FUROSEMIDE 20 MG/1
20 TABLET ORAL DAILY
Status: ON HOLD | COMMUNITY
End: 2022-06-17 | Stop reason: HOSPADM

## 2021-11-30 NOTE — PROGRESS NOTES
Subjective:      Patient ID: Neymar Cain is a 80 y.o. male. 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 Maze and atriclip  B. Negative cardionet 2019      History of DVT (deep vein thrombosis) 01/23/2017    Type 2 diabetes mellitus ([facility] 05/31/2013    Coronary artery disease involving native coronary artery of native heart 05/31/2013     Overview Note:     A. PCI 2003 - PTCA of LAD 2004 - [place]  B.  ACB 7/24/2017:  LIMA-LAD,CRYOVEIN TO PL., CIRCUMFLEX WITH HIGH GRADE LESION AFTER HUGE OM1, OM2 NOT GRAFTED         Current Outpatient Medications   Medication Sig Dispense Refill    SITagliptin (JANUVIA) 50 MG tablet Take 50 mg by mouth daily      furosemide (LASIX) 20 MG tablet Take 20 mg by mouth daily      diphenhydrAMINE (BENADRYL) 25 MG capsule Take 25 mg by mouth every 6 hours as needed for Itching      folic acid-pyridoxine-cyancobalamin (FOLBIC) 2.5-25-2 MG TABS Take 1 tablet by mouth daily      Multiple Vitamins-Minerals (CEROVITE SENIOR PO) Take by mouth Daily      REFRESH LIQUIGEL 1 % ophthalmic gel Place into the right eye 4 times daily  12    PROCTOZONE-HC 2.5 % rectal cream   3    fluorometholone (FML) 0.1 % ophthalmic suspension Place 1 drop into the right eye 2 times daily as needed      Propylene Glycol (SYSTANE BALANCE) 0.6 % SOLN Apply to eye as needed      ELIQUIS 5 MG TABS tablet Take 1 tablet by mouth 2 times daily      MILK OF MAGNESIA 400 MG/5ML suspension as needed  1    ondansetron (ZOFRAN-ODT) 4 MG disintegrating tablet Take 1 tablet by mouth every 6 hours as needed  0    PREPARATION H 1-0.25-14.4-15 % CREA rectal cream Place rectally as needed  11    metoprolol succinate (TOPROL XL) 50 MG extended release tablet Take 1 tablet by mouth daily  4    tamsulosin (FLOMAX) 0.4 MG capsule Take 0.4 mg by mouth daily       ferrous sulfate 325 (65 Fe) MG tablet Take 325 mg by mouth daily (with breakfast)      enalapril (VASOTEC) 5 MG tablet Take 5 mg by mouth daily      acetaminophen (TYLENOL) 325 MG tablet Take 2 tablets by mouth every 4 hours as needed for Pain 120 tablet 3    Multiple Vitamins-Minerals (OCUVITE PO) Take 250 mg by mouth daily      glipiZIDE (GLUCOTROL XL) 5 MG extended release tablet Take 5 mg by mouth daily      magnesium oxide (MAG-OX) 400 MG tablet Take 400 mg by mouth daily.  Omega-3-acid Ethyl Esters (OMACOR PO) Take 1 g by mouth 3 times daily Last dose 17      simvastatin (ZOCOR) 80 MG tablet Take 80 mg by mouth nightly. No current facility-administered medications for this visit.         No Known Allergies    Vitals:    21 1038   BP: (!) 109/54   Pulse: 51   Resp: 12   Weight: 244 lb (110.7 kg)   Height: 6' 1\" (1.854 m)       Social History     Socioeconomic History    Marital status:      Spouse name: Not on file    Number of children: Not on file    Years of education: Not on file    Highest education level: Not on file   Occupational History    Not on file   Tobacco Use    Smoking status: Former Smoker     Packs/day: 1.00     Years: 10.00     Pack years: 10.00     Types: Cigarettes     Quit date: 1964     Years since quittin.9    Smokeless tobacco: Never Used   Vaping Use    Vaping Use: Never used   Substance and Sexual Activity    Alcohol use: Yes     Comment: social    Drug use: No    Sexual activity: Not on file   Other Topics Concern    Not on file   Social History Narrative    Not on file     Social Determinants of Health     Financial Resource Strain:     Difficulty of Paying Living Expenses: Not on file   Food Insecurity:     Worried About Running Out of Food in Head: Normocephalic and atraumatic. Normal lips, teeth, gums, oral mucosa wet. Eyes: Conjunctivae are normal. Pupils are equal, round,  Neck: No  JVD present. Cardiovascular: irregular rhythm, S1 normal, S2 normal, intact distal pulses and normal pulses. PMI is not displaced. Exam reveals no gallop. No murmur heard. Carotid bruit is not present   Pulmonary/Chest: Effort normal and breath sounds normal. No respiratory distress. Abdominal: Soft. Normal appearance and bowel sounds are normal. He exhibits no abdominal bruit and no pulsatile midline mass. There is no hepatosplenomegaly. There is no tenderness. Musculoskeletal: He exhibits trace edema  Uses walker  Gait normal   Neurological: He is alert and oriented to person, place, and time. Gait normal.   Skin: Skin is warm and dry. No bruising, no ecchymosis and no rash noted. Rash is not nodular. He is not diaphoretic. No cyanosis. Psychiatric: He has a normal mood and affect. His behavior is normal. Thought content normal.     EKG: sinus bradycardia, rate 51, axis +54, normal     ASSESSMENT & PLAN:    Patient Active Problem List   Diagnosis    HX of atrial fibrillation :    On NOAC. Danya Corona Hyperlipidemia LDL goal < 70: On Zocor    Hypertension:     Aortic insufficiency: mild by echo 2019.   No murmur today    Diabetes mellitus    Single kidney    Coronary atherosclerosis of native coronary artery: s/p ACB 2017    DVT (deep venous thrombosis): on life long coumadin     Will back down to 25 mg qd on Toprol given bradycardia and relative hypotension  Encouraged walking qd      OV 1 year

## 2022-05-05 ENCOUNTER — HOSPITAL ENCOUNTER (OUTPATIENT)
Dept: INTERVENTIONAL RADIOLOGY/VASCULAR | Age: 87
Discharge: HOME | End: 2022-05-07
Payer: MEDICARE

## 2022-05-05 DIAGNOSIS — I73.9 PERIPHERAL VASCULAR DISEASE, UNSPECIFIED (HCC): ICD-10-CM

## 2022-05-05 PROCEDURE — 93923 UPR/LXTR ART STDY 3+ LVLS: CPT

## 2022-05-23 ENCOUNTER — HOSPITAL ENCOUNTER (OUTPATIENT)
Age: 87
Discharge: HOME | End: 2022-05-23
Payer: MEDICARE

## 2022-05-23 ENCOUNTER — HOSPITAL ENCOUNTER (OUTPATIENT)
Age: 87
Discharge: HOME | End: 2022-05-25
Payer: MEDICARE

## 2022-05-23 ENCOUNTER — HOSPITAL ENCOUNTER (OUTPATIENT)
Dept: GENERAL RADIOLOGY | Age: 87
Discharge: HOME | End: 2022-05-25
Payer: MEDICARE

## 2022-05-23 DIAGNOSIS — L97.521 DIABETIC ULCER OF TOE OF LEFT FOOT ASSOCIATED WITH DIABETES MELLITUS DUE TO UNDERLYING CONDITION, LIMITED TO BREAKDOWN OF SKIN (HCC): ICD-10-CM

## 2022-05-23 DIAGNOSIS — E08.621 DIABETIC ULCER OF TOE OF LEFT FOOT ASSOCIATED WITH DIABETES MELLITUS DUE TO UNDERLYING CONDITION, LIMITED TO BREAKDOWN OF SKIN (HCC): ICD-10-CM

## 2022-05-23 LAB
C-REACTIVE PROTEIN: 3.5 MG/DL (ref 0–0.4)
SEDIMENTATION RATE, ERYTHROCYTE: 35 MM/HR (ref 0–15)

## 2022-05-23 PROCEDURE — 86140 C-REACTIVE PROTEIN: CPT

## 2022-05-23 PROCEDURE — 36415 COLL VENOUS BLD VENIPUNCTURE: CPT

## 2022-05-23 PROCEDURE — 73630 X-RAY EXAM OF FOOT: CPT

## 2022-05-23 PROCEDURE — 85651 RBC SED RATE NONAUTOMATED: CPT

## 2022-06-13 ENCOUNTER — APPOINTMENT (OUTPATIENT)
Dept: CT IMAGING | Age: 87
End: 2022-06-13
Payer: MEDICARE

## 2022-06-13 ENCOUNTER — HOSPITAL ENCOUNTER (OUTPATIENT)
Age: 87
Setting detail: OBSERVATION
Discharge: HOME | End: 2022-06-17
Attending: EMERGENCY MEDICINE | Admitting: INTERNAL MEDICINE
Payer: MEDICARE

## 2022-06-13 ENCOUNTER — APPOINTMENT (OUTPATIENT)
Dept: GENERAL RADIOLOGY | Age: 87
End: 2022-06-13
Payer: MEDICARE

## 2022-06-13 DIAGNOSIS — R42 DIZZINESS: Primary | ICD-10-CM

## 2022-06-13 PROBLEM — Z86.711 HISTORY OF PULMONARY EMBOLISM: Status: ACTIVE | Noted: 2022-06-13

## 2022-06-13 PROBLEM — R79.89 ELEVATED SERUM CREATININE: Status: ACTIVE | Noted: 2022-06-13

## 2022-06-13 PROBLEM — E78.5 HLD (HYPERLIPIDEMIA): Status: ACTIVE | Noted: 2022-06-13

## 2022-06-13 PROBLEM — Z86.73 HISTORY OF CVA (CEREBROVASCULAR ACCIDENT): Status: ACTIVE | Noted: 2022-06-13

## 2022-06-13 PROBLEM — R79.89 ELEVATED TROPONIN: Status: ACTIVE | Noted: 2022-06-13

## 2022-06-13 PROBLEM — R29.90 STROKE-LIKE SYMPTOMS: Status: ACTIVE | Noted: 2022-06-13

## 2022-06-13 PROBLEM — R77.8 ELEVATED TROPONIN: Status: ACTIVE | Noted: 2022-06-13

## 2022-06-13 LAB
ALBUMIN SERPL-MCNC: 3.9 G/DL (ref 3.5–5.2)
ALP BLD-CCNC: 66 U/L (ref 40–129)
ALT SERPL-CCNC: 23 U/L (ref 0–40)
ANION GAP SERPL CALCULATED.3IONS-SCNC: 12 MMOL/L (ref 7–16)
APTT: 24.8 SEC (ref 24.5–35.1)
AST SERPL-CCNC: 25 U/L (ref 0–39)
BASOPHILS ABSOLUTE: 0 E9/L (ref 0–0.2)
BASOPHILS RELATIVE PERCENT: 0 % (ref 0–2)
BILIRUB SERPL-MCNC: 0.6 MG/DL (ref 0–1.2)
BILIRUBIN URINE: NEGATIVE
BLOOD, URINE: NEGATIVE
BUN BLDV-MCNC: 39 MG/DL (ref 6–23)
CALCIUM SERPL-MCNC: 9.2 MG/DL (ref 8.6–10.2)
CHLORIDE BLD-SCNC: 100 MMOL/L (ref 98–107)
CLARITY: CLEAR
CO2: 24 MMOL/L (ref 22–29)
COLOR: YELLOW
CREAT SERPL-MCNC: 1.5 MG/DL (ref 0.7–1.2)
EOSINOPHILS ABSOLUTE: 0.1 E9/L (ref 0.05–0.5)
EOSINOPHILS RELATIVE PERCENT: 1.1 % (ref 0–6)
GFR AFRICAN AMERICAN: 53
GFR NON-AFRICAN AMERICAN: 44 ML/MIN/1.73
GLUCOSE BLD-MCNC: 229 MG/DL (ref 74–99)
GLUCOSE URINE: NEGATIVE MG/DL
HCT VFR BLD CALC: 37.6 % (ref 37–54)
HEMOGLOBIN: 12.7 G/DL (ref 12.5–16.5)
IMMATURE GRANULOCYTES #: 0.04 E9/L
IMMATURE GRANULOCYTES %: 0.5 % (ref 0–5)
INR BLD: 1.2
KETONES, URINE: NEGATIVE MG/DL
LEUKOCYTE ESTERASE, URINE: NEGATIVE
LYMPHOCYTES ABSOLUTE: 1.33 E9/L (ref 1.5–4)
LYMPHOCYTES RELATIVE PERCENT: 15.1 % (ref 20–42)
MCH RBC QN AUTO: 29.8 PG (ref 26–35)
MCHC RBC AUTO-ENTMCNC: 33.8 % (ref 32–34.5)
MCV RBC AUTO: 88.3 FL (ref 80–99.9)
MONOCYTES ABSOLUTE: 0.82 E9/L (ref 0.1–0.95)
MONOCYTES RELATIVE PERCENT: 9.3 % (ref 2–12)
NEUTROPHILS ABSOLUTE: 6.52 E9/L (ref 1.8–7.3)
NEUTROPHILS RELATIVE PERCENT: 74 % (ref 43–80)
NITRITE, URINE: NEGATIVE
PDW BLD-RTO: 14.9 FL (ref 11.5–15)
PH UA: 6 (ref 5–9)
PLATELET # BLD: 169 E9/L (ref 130–450)
PMV BLD AUTO: 9.8 FL (ref 7–12)
POTASSIUM SERPL-SCNC: 4.5 MMOL/L (ref 3.5–5)
PROTEIN UA: NEGATIVE MG/DL
PROTHROMBIN TIME: 12.9 SEC (ref 9.3–12.4)
RBC # BLD: 4.26 E12/L (ref 3.8–5.8)
SODIUM BLD-SCNC: 136 MMOL/L (ref 132–146)
SPECIFIC GRAVITY UA: 1.02 (ref 1–1.03)
TOTAL PROTEIN: 6.6 G/DL (ref 6.4–8.3)
TROPONIN, HIGH SENSITIVITY: 32 NG/L (ref 0–11)
UROBILINOGEN, URINE: 0.2 E.U./DL
WBC # BLD: 8.8 E9/L (ref 4.5–11.5)

## 2022-06-13 PROCEDURE — 99285 EMERGENCY DEPT VISIT HI MDM: CPT

## 2022-06-13 PROCEDURE — G0378 HOSPITAL OBSERVATION PER HR: HCPCS

## 2022-06-13 PROCEDURE — 85025 COMPLETE CBC W/AUTO DIFF WBC: CPT

## 2022-06-13 PROCEDURE — 71045 X-RAY EXAM CHEST 1 VIEW: CPT

## 2022-06-13 PROCEDURE — 93005 ELECTROCARDIOGRAM TRACING: CPT | Performed by: EMERGENCY MEDICINE

## 2022-06-13 PROCEDURE — 70450 CT HEAD/BRAIN W/O DYE: CPT

## 2022-06-13 PROCEDURE — 85730 THROMBOPLASTIN TIME PARTIAL: CPT

## 2022-06-13 PROCEDURE — 84484 ASSAY OF TROPONIN QUANT: CPT

## 2022-06-13 PROCEDURE — 85610 PROTHROMBIN TIME: CPT

## 2022-06-13 PROCEDURE — 81003 URINALYSIS AUTO W/O SCOPE: CPT

## 2022-06-13 PROCEDURE — 36415 COLL VENOUS BLD VENIPUNCTURE: CPT

## 2022-06-13 PROCEDURE — 80053 COMPREHEN METABOLIC PANEL: CPT

## 2022-06-13 ASSESSMENT — PAIN - FUNCTIONAL ASSESSMENT: PAIN_FUNCTIONAL_ASSESSMENT: NONE - DENIES PAIN

## 2022-06-13 NOTE — ED PROVIDER NOTES
Department of Emergency Medicine   ED  Provider Note  Admit Date/RoomTime: 6/13/2022  1:41 PM  ED Room: [facility]          History of Present Illness:  6/13/22, Time: 7:59 PM EDT  Chief Complaint   Patient presents with    Eye Pain     woke up at 0700 and shortly after starting having pain behind the right eye. Per family, the patient presented that way with a stroke in the past. Med list provided does not show blood thinners. Pt denies any pain at this time. Wilberto Fried is a 80 y.o. male presenting to the ED for eye pain. Patient woke up around 7 AM with pain behind his right eye along with what he describes ataxia. Was unable keep his balance. Came on suddenly, nothing made it better or worse, no associated pain. He states that this in the past secondary to acute CVA. He is on Eliquis. He is compliant with it. He said most of symptoms have resolved. He denies any fever, chills, nausea, vomiting, cough, sputum, change in bowel or bladder, or any other symptoms or complaints. Review of Systems:   Pertinent positives and negatives are stated within HPI, all other systems reviewed and are negative.        --------------------------------------------- PAST HISTORY ---------------------------------------------  Past Medical History:  has a past medical history of Cancer ([facility] Congenital absence of one kidney, Hyperlipidemia, Hypertension, Kidney stones, and PE (pulmonary thromboembolism) ([facility] [facility] Past Surgical History:  has a past surgical history that includes Diagnostic Cardiac Cath Lab Procedure; joint replacement (Right, 2011); joint replacement (Left, 2012); Skin graft (Bilateral, 1950); Cholecystectomy (2007); Tonsillectomy; eye surgery; Colon surgery (Right, 11/7/2014); right colectomy (11/07/2014); Coronary angioplasty (2004); Cystoscopy (04/12/2017); Cardiac catheterization (07/24/2017); and Coronary artery bypass graft.     Social History:  reports that he quit smoking about 58 years ago. His smoking use included cigarettes. He has a 10.00 pack-year smoking history. He has never used smokeless tobacco. He reports current alcohol use. He reports that he does not use drugs. Family History: family history includes Heart Disease in his father. . Unless otherwise noted, family history is non contributory    The patients home medications have been reviewed. Allergies: Patient has no known allergies. ---------------------------------------------------PHYSICAL EXAM--------------------------------------    Constitutional/General: Alert and oriented x3  Head: Normocephalic and atraumatic  Eyes: PERRL, EOMI, sclera non icteric  Mouth: Oropharynx clear, handling secretions, no trismus, no asymmetry of the posterior oropharynx or uvular edema  Neck: Supple, full ROM, no stridor, no meningeal signs  Respiratory: Lungs clear to auscultation bilaterally, no wheezes, rales, or rhonchi. Not in respiratory distress  Cardiovascular:  Regular rate. Regular rhythm. 2+ distal pulses. Equal extremity pulses. Chest: No chest wall tenderness  GI:  Abdomen Soft, Non tender, Non distended. No rebound, guarding, or rigidity. No pulsatile masses. Musculoskeletal: Moves all extremities x 4. Warm and well perfused, no clubbing, cyanosis, or edema. Capillary refill <3 seconds  Integument: skin warm and dry. No rashes. Neurologic: GCS 15, no focal deficits, symmetric strength 5/5 in the upper and lower extremities bilaterally. NIH is 0  Psychiatric: Normal Affect          -------------------------------------------------- RESULTS -------------------------------------------------  I have personally reviewed all laboratory and imaging results for this patient. Results are listed below.      LABS: (Lab results interpreted by me)  Results for orders placed or performed during the hospital encounter of 06/13/22   CBC with Auto Differential   Result Value Ref Range    WBC 8.8 4.5 - 11.5 E9/L    RBC 4.26 3.80 - 5.80 E12/L    Hemoglobin 12.7 12.5 - 16.5 g/dL    Hematocrit 37.6 37.0 - 54.0 %    MCV 88.3 80.0 - 99.9 fL    MCH 29.8 26.0 - 35.0 pg    MCHC 33.8 32.0 - 34.5 %    RDW 14.9 11.5 - 15.0 fL    Platelets 183 519 - 872 E9/L    MPV 9.8 7.0 - 12.0 fL    Neutrophils % 74.0 43.0 - 80.0 %    Immature Granulocytes % 0.5 0.0 - 5.0 %    Lymphocytes % 15.1 (L) 20.0 - 42.0 %    Monocytes % 9.3 2.0 - 12.0 %    Eosinophils % 1.1 0.0 - 6.0 %    Basophils % 0.0 0.0 - 2.0 %    Neutrophils Absolute 6.52 1.80 - 7.30 E9/L    Immature Granulocytes # 0.04 E9/L    Lymphocytes Absolute 1.33 (L) 1.50 - 4.00 E9/L    Monocytes Absolute 0.82 0.10 - 0.95 E9/L    Eosinophils Absolute 0.10 0.05 - 0.50 E9/L    Basophils Absolute 0.00 0.00 - 0.20 E9/L   Comprehensive Metabolic Panel   Result Value Ref Range    Sodium 136 132 - 146 mmol/L    Potassium 4.5 3.5 - 5.0 mmol/L    Chloride 100 98 - 107 mmol/L    CO2 24 22 - 29 mmol/L    Anion Gap 12 7 - 16 mmol/L    Glucose 229 (H) 74 - 99 mg/dL    BUN 39 (H) 6 - 23 mg/dL    CREATININE 1.5 (H) 0.7 - 1.2 mg/dL    GFR Non-African American 44 >=60 mL/min/1.73    GFR African American 53     Calcium 9.2 8.6 - 10.2 mg/dL    Total Protein 6.6 6.4 - 8.3 g/dL    Albumin 3.9 3.5 - 5.2 g/dL    Total Bilirubin 0.6 0.0 - 1.2 mg/dL    Alkaline Phosphatase 66 40 - 129 U/L    ALT 23 0 - 40 U/L    AST 25 0 - 39 U/L   Troponin   Result Value Ref Range    Troponin, High Sensitivity 32 (H) 0 - 11 ng/L   Protime-INR   Result Value Ref Range    Protime 12.9 (H) 9.3 - 12.4 sec    INR 1.2    APTT   Result Value Ref Range    aPTT 24.8 24.5 - 35.1 sec   Urinalysis   Result Value Ref Range    Color, UA Yellow Straw/Yellow    Clarity, UA Clear Clear    Glucose, Ur Negative Negative mg/dL    Bilirubin Urine Negative Negative    Ketones, Urine Negative Negative mg/dL    Specific Gravity, UA 1.020 1.005 - 1.030    Blood, Urine Negative Negative    pH, UA 6.0 5.0 - 9.0    Protein, UA Negative Negative mg/dL    Urobilinogen, Urine 0.2 <2.0 E.U./dL    Nitrite, Urine Negative Negative    Leukocyte Esterase, Urine Negative Negative   EKG 12 Lead   Result Value Ref Range    Ventricular Rate 52 BPM    Atrial Rate 52 BPM    P-R Interval 220 ms    QRS Duration 96 ms    Q-T Interval 468 ms    QTc Calculation (Bazett) 435 ms    P Axis 60 degrees    R Axis 28 degrees    T Axis 26 degrees   ,       RADIOLOGY:  Interpreted by Radiologist unless otherwise specified  CT HEAD WO CONTRAST   Final Result   No acute intracranial abnormality or hemorrhage. Cortical atrophy and periventricular leukomalacia. XR CHEST PORTABLE   Final Result   1. There is no acute cardiopulmonary disease. EKG Interpretation  Interpreted by emergency department physician, Dr. Sarah Galdamez, rate 52, no STEMI, no ischemic change         ------------------------- NURSING NOTES AND VITALS REVIEWED ---------------------------   The nursing notes within the ED encounter and vital signs as below have been reviewed by myself  BP (!) 147/89   Pulse 66   Temp 98.4 °F (36.9 °C)   Resp 16   Ht 6' 1\" (1.854 m)   Wt 220 lb (99.8 kg)   SpO2 97%   BMI 29.03 kg/m²     Oxygen Saturation Interpretation: Normal    The patients available past medical records and past encounters were reviewed. ------------------------------ ED COURSE/MEDICAL DECISION MAKING----------------------  Medications - No data to display        The cardiac monitor revealed sinus with a heart rate in the 80s as interpreted by me. The cardiac monitor was ordered secondary to the patient's dizzy and to monitor the patient for dysrhythmia. CPT [id number]         Medical Decision Making:    Patient had an NIH of 0 on arrival.  He is not a tPA candidate based on this along with the fact that he is on Eliquis, and NIH is too low for IR. Labs and imaging reviewed. Reevaluation, exam unchanged. Given his history, patient be admitted. Counseling:    The emergency provider has spoken with the patient and discussed todays results, in addition to providing specific details for the plan of care and counseling regarding the diagnosis and prognosis. Questions are answered at this time and they are agreeable with the plan.       --------------------------------- IMPRESSION AND DISPOSITION ---------------------------------    IMPRESSION  1. Dizziness        DISPOSITION  Disposition: Admit to telemetry  Patient condition is stable        NOTE: This report was transcribed using voice recognition software.  Every effort was made to ensure accuracy; however, inadvertent computerized transcription errors may be present        Reshma Sunshine MD  06/13/22 2003

## 2022-06-14 ENCOUNTER — APPOINTMENT (OUTPATIENT)
Dept: CT IMAGING | Age: 87
End: 2022-06-14
Payer: MEDICARE

## 2022-06-14 LAB
EKG ATRIAL RATE: 52 BPM
EKG P AXIS: 60 DEGREES
EKG P-R INTERVAL: 220 MS
EKG Q-T INTERVAL: 468 MS
EKG QRS DURATION: 96 MS
EKG QTC CALCULATION (BAZETT): 435 MS
EKG R AXIS: 28 DEGREES
EKG T AXIS: 26 DEGREES
EKG VENTRICULAR RATE: 52 BPM
METER GLUCOSE: 151 MG/DL (ref 74–99)
METER GLUCOSE: 158 MG/DL (ref 74–99)
METER GLUCOSE: 163 MG/DL (ref 74–99)
METER GLUCOSE: 207 MG/DL (ref 74–99)
TROPONIN, HIGH SENSITIVITY: 33 NG/L (ref 0–11)

## 2022-06-14 PROCEDURE — 92610 EVALUATE SWALLOWING FUNCTION: CPT

## 2022-06-14 PROCEDURE — 2580000003 HC RX 258: Performed by: NURSE PRACTITIONER

## 2022-06-14 PROCEDURE — 70496 CT ANGIOGRAPHY HEAD: CPT

## 2022-06-14 PROCEDURE — 99204 OFFICE O/P NEW MOD 45 MIN: CPT | Performed by: INTERNAL MEDICINE

## 2022-06-14 PROCEDURE — 70498 CT ANGIOGRAPHY NECK: CPT

## 2022-06-14 PROCEDURE — 82962 GLUCOSE BLOOD TEST: CPT

## 2022-06-14 PROCEDURE — 6360000004 HC RX CONTRAST MEDICATION: Performed by: STUDENT IN AN ORGANIZED HEALTH CARE EDUCATION/TRAINING PROGRAM

## 2022-06-14 PROCEDURE — 84484 ASSAY OF TROPONIN QUANT: CPT

## 2022-06-14 PROCEDURE — G0378 HOSPITAL OBSERVATION PER HR: HCPCS

## 2022-06-14 PROCEDURE — 36415 COLL VENOUS BLD VENIPUNCTURE: CPT

## 2022-06-14 PROCEDURE — 6370000000 HC RX 637 (ALT 250 FOR IP): Performed by: NURSE PRACTITIONER

## 2022-06-14 PROCEDURE — 97165 OT EVAL LOW COMPLEX 30 MIN: CPT

## 2022-06-14 PROCEDURE — 6370000000 HC RX 637 (ALT 250 FOR IP): Performed by: INTERNAL MEDICINE

## 2022-06-14 PROCEDURE — APPSS180 APP SPLIT SHARED TIME > 60 MINUTES: Performed by: NURSE PRACTITIONER

## 2022-06-14 PROCEDURE — 97535 SELF CARE MNGMENT TRAINING: CPT

## 2022-06-14 RX ORDER — ACETAMINOPHEN 325 MG/1
650 TABLET ORAL EVERY 6 HOURS PRN
Status: DISCONTINUED | OUTPATIENT
Start: 2022-06-14 | End: 2022-06-17 | Stop reason: HOSPADM

## 2022-06-14 RX ORDER — METOPROLOL SUCCINATE 25 MG/1
25 TABLET, EXTENDED RELEASE ORAL DAILY
Status: DISCONTINUED | OUTPATIENT
Start: 2022-06-15 | End: 2022-06-17 | Stop reason: HOSPADM

## 2022-06-14 RX ORDER — PANTOPRAZOLE SODIUM 40 MG/1
40 TABLET, DELAYED RELEASE ORAL
Status: DISCONTINUED | OUTPATIENT
Start: 2022-06-14 | End: 2022-06-17 | Stop reason: HOSPADM

## 2022-06-14 RX ORDER — SODIUM CHLORIDE 0.9 % (FLUSH) 0.9 %
5-40 SYRINGE (ML) INJECTION EVERY 12 HOURS SCHEDULED
Status: DISCONTINUED | OUTPATIENT
Start: 2022-06-14 | End: 2022-06-17 | Stop reason: HOSPADM

## 2022-06-14 RX ORDER — METOPROLOL SUCCINATE 50 MG/1
50 TABLET, EXTENDED RELEASE ORAL DAILY
Status: DISCONTINUED | OUTPATIENT
Start: 2022-06-15 | End: 2022-06-14

## 2022-06-14 RX ORDER — ACETAMINOPHEN 650 MG/1
650 SUPPOSITORY RECTAL EVERY 6 HOURS PRN
Status: DISCONTINUED | OUTPATIENT
Start: 2022-06-14 | End: 2022-06-17 | Stop reason: HOSPADM

## 2022-06-14 RX ORDER — SODIUM CHLORIDE 9 MG/ML
INJECTION, SOLUTION INTRAVENOUS PRN
Status: DISCONTINUED | OUTPATIENT
Start: 2022-06-14 | End: 2022-06-17 | Stop reason: HOSPADM

## 2022-06-14 RX ORDER — DEXTROSE MONOHYDRATE 50 MG/ML
100 INJECTION, SOLUTION INTRAVENOUS PRN
Status: DISCONTINUED | OUTPATIENT
Start: 2022-06-14 | End: 2022-06-17 | Stop reason: HOSPADM

## 2022-06-14 RX ORDER — FERROUS SULFATE 325(65) MG
325 TABLET ORAL
Status: DISCONTINUED | OUTPATIENT
Start: 2022-06-14 | End: 2022-06-17 | Stop reason: HOSPADM

## 2022-06-14 RX ORDER — BISACODYL 10 MG
10 SUPPOSITORY, RECTAL RECTAL DAILY PRN
Status: DISCONTINUED | OUTPATIENT
Start: 2022-06-14 | End: 2022-06-17 | Stop reason: HOSPADM

## 2022-06-14 RX ORDER — ATORVASTATIN CALCIUM 40 MG/1
40 TABLET, FILM COATED ORAL NIGHTLY
Status: DISCONTINUED | OUTPATIENT
Start: 2022-06-14 | End: 2022-06-17 | Stop reason: HOSPADM

## 2022-06-14 RX ORDER — LANOLIN ALCOHOL/MO/W.PET/CERES
400 CREAM (GRAM) TOPICAL DAILY
Status: DISCONTINUED | OUTPATIENT
Start: 2022-06-14 | End: 2022-06-17 | Stop reason: HOSPADM

## 2022-06-14 RX ORDER — MULTIVITAMIN WITH IRON
1 TABLET ORAL DAILY
Status: DISCONTINUED | OUTPATIENT
Start: 2022-06-14 | End: 2022-06-17 | Stop reason: HOSPADM

## 2022-06-14 RX ORDER — ONDANSETRON 4 MG/1
4 TABLET, ORALLY DISINTEGRATING ORAL EVERY 8 HOURS PRN
Status: DISCONTINUED | OUTPATIENT
Start: 2022-06-14 | End: 2022-06-17 | Stop reason: HOSPADM

## 2022-06-14 RX ORDER — ASPIRIN 81 MG/1
81 TABLET ORAL DAILY
Status: DISCONTINUED | OUTPATIENT
Start: 2022-06-14 | End: 2022-06-17 | Stop reason: HOSPADM

## 2022-06-14 RX ORDER — INSULIN LISPRO 100 [IU]/ML
0-12 INJECTION, SOLUTION INTRAVENOUS; SUBCUTANEOUS
Status: DISCONTINUED | OUTPATIENT
Start: 2022-06-14 | End: 2022-06-17 | Stop reason: HOSPADM

## 2022-06-14 RX ORDER — ATORVASTATIN CALCIUM 40 MG/1
40 TABLET, FILM COATED ORAL NIGHTLY
Status: CANCELLED | OUTPATIENT
Start: 2022-06-14

## 2022-06-14 RX ORDER — CALCIUM POLYCARBOPHIL 625 MG/1
2 TABLET, FILM COATED ORAL 3 TIMES DAILY
COMMUNITY
Start: 2022-06-07

## 2022-06-14 RX ORDER — SODIUM CHLORIDE 0.9 % (FLUSH) 0.9 %
5-40 SYRINGE (ML) INJECTION PRN
Status: DISCONTINUED | OUTPATIENT
Start: 2022-06-14 | End: 2022-06-17 | Stop reason: HOSPADM

## 2022-06-14 RX ORDER — ONDANSETRON 2 MG/ML
4 INJECTION INTRAMUSCULAR; INTRAVENOUS EVERY 6 HOURS PRN
Status: DISCONTINUED | OUTPATIENT
Start: 2022-06-14 | End: 2022-06-17 | Stop reason: HOSPADM

## 2022-06-14 RX ORDER — DOCUSATE SODIUM 100 MG/1
100 CAPSULE, LIQUID FILLED ORAL NIGHTLY
Status: DISCONTINUED | OUTPATIENT
Start: 2022-06-14 | End: 2022-06-17 | Stop reason: HOSPADM

## 2022-06-14 RX ORDER — INSULIN LISPRO 100 [IU]/ML
0-6 INJECTION, SOLUTION INTRAVENOUS; SUBCUTANEOUS NIGHTLY
Status: DISCONTINUED | OUTPATIENT
Start: 2022-06-14 | End: 2022-06-17 | Stop reason: HOSPADM

## 2022-06-14 RX ORDER — POLYVINYL ALCOHOL 14 MG/ML
1 SOLUTION/ DROPS OPHTHALMIC
Status: DISCONTINUED | OUTPATIENT
Start: 2022-06-14 | End: 2022-06-17 | Stop reason: HOSPADM

## 2022-06-14 RX ORDER — DEXTROSE MONOHYDRATE 50 MG/ML
100 INJECTION, SOLUTION INTRAVENOUS PRN
Status: CANCELLED | OUTPATIENT
Start: 2022-06-14

## 2022-06-14 RX ORDER — TAMSULOSIN HYDROCHLORIDE 0.4 MG/1
0.4 CAPSULE ORAL DAILY
Status: DISCONTINUED | OUTPATIENT
Start: 2022-06-14 | End: 2022-06-17 | Stop reason: HOSPADM

## 2022-06-14 RX ADMIN — INSULIN LISPRO 2 UNITS: 100 INJECTION, SOLUTION INTRAVENOUS; SUBCUTANEOUS at 21:09

## 2022-06-14 RX ADMIN — FERROUS SULFATE TAB 325 MG (65 MG ELEMENTAL FE) 325 MG: 325 (65 FE) TAB at 09:13

## 2022-06-14 RX ADMIN — IOPAMIDOL 60 ML: 755 INJECTION, SOLUTION INTRAVENOUS at 10:01

## 2022-06-14 RX ADMIN — INSULIN LISPRO 2 UNITS: 100 INJECTION, SOLUTION INTRAVENOUS; SUBCUTANEOUS at 11:28

## 2022-06-14 RX ADMIN — APIXABAN 5 MG: 5 TABLET, FILM COATED ORAL at 09:13

## 2022-06-14 RX ADMIN — ASPIRIN 81 MG: 81 TABLET, COATED ORAL at 11:27

## 2022-06-14 RX ADMIN — Medication 400 MG: at 09:17

## 2022-06-14 RX ADMIN — Medication 1 TABLET: at 11:27

## 2022-06-14 RX ADMIN — Medication 10 ML: at 21:09

## 2022-06-14 RX ADMIN — INSULIN LISPRO 2 UNITS: 100 INJECTION, SOLUTION INTRAVENOUS; SUBCUTANEOUS at 16:32

## 2022-06-14 RX ADMIN — Medication 1 TABLET: at 11:28

## 2022-06-14 RX ADMIN — PANTOPRAZOLE SODIUM 40 MG: 40 TABLET, DELAYED RELEASE ORAL at 09:13

## 2022-06-14 RX ADMIN — Medication 10 ML: at 09:13

## 2022-06-14 RX ADMIN — TAMSULOSIN HYDROCHLORIDE 0.4 MG: 0.4 CAPSULE ORAL at 09:13

## 2022-06-14 RX ADMIN — Medication 10 ML: at 10:01

## 2022-06-14 RX ADMIN — ATORVASTATIN CALCIUM 40 MG: 40 TABLET, FILM COATED ORAL at 21:07

## 2022-06-14 RX ADMIN — APIXABAN 5 MG: 5 TABLET, FILM COATED ORAL at 21:07

## 2022-06-14 NOTE — PROGRESS NOTES
SPEECH/LANGUAGE PATHOLOGY  CLINICAL ASSESSMENT OF SWALLOWING FUNCTION   and PLAN OF CARE    PATIENT NAME:  Rosita Hart  (male)     MRN:  [id number]    :  1934  (80 y.o.)  STATUS:  Inpatient: Room 8207/8207-A    TODAY'S DATE:  2022  REFERRING PROVIDER:GALLITO Lara CNP       SPECIFIC PROVIDER ORDER: SLP swallowing-dysphagia evaluation and treatment Date of order:  22  REASON FOR REFERRAL: dysphagia   EVALUATING THERAPIST: ALEA Driver                 RESULTS:    DYSPHAGIA DIAGNOSIS:   Clinical indicators of mild-moderate oral phase dysphagia       DIET RECOMMENDATIONS:  Easy to chew consistency solids (IDDSI level 7, transitional) with  thin liquids (IDDSI level 0)     FEEDING RECOMMENDATIONS:     Assistance level:  Set-up is required for all oral intake      Compensatory strategies recommended: Small bites/sips      Discussed recommendations with nursing and/or faxed report to referring provider: No secondary to no diet/liquid change recommended     SPEECH THERAPY  PLAN OF CARE   The dysphagia POC is established based on physician order, dysphagia diagnosis and results of clinical assessment     Dysphagia therapy is not recommended     Conditions Requiring Skilled Therapeutic Intervention for dysphagia:    Not applicable    Specific dysphagia interventions to include:     not applicable    Specific instructions for next treatment:  not applicable   Patient Treatment Goals:    Short Term Goals:  Not applicable no therapy warranted     Long Term Goals:   Not applicable no therapy warranted      Patient/family Goal:    not applicable                    ADMITTING DIAGNOSIS: Dizziness [R42]  Stroke-like symptoms [R29.90]    VISIT DIAGNOSIS:   Visit Diagnoses       Codes    Dizziness    -  Primary R42           PATIENT REPORT/COMPLAINT: denies difficulty swallowing  nursing not available at time of evaluation chart reviewed and patient is not NPO for any procedures per current documentation. PRIOR LEVEL OF SWALLOW FUNCTION:    PAST HISTORY OF DYSPHAGIA?: none reported    Current Diet Order:  ADULT DIET; Easy to Chew; 5 carb choices (75 gm/meal)    PROCEDURE:  Consistencies Administered During the Evaluation   Liquids: thin liquid   Solids:  pureed foods and solid foods      Method of Intake:   cup, straw, spoon  Fed by clinician      Position:   Seated, upright    CLINICAL ASSESSMENT:  Oral Stage:       Decreased mastication due to:  cognitive function      Pharyngeal Stage:    No signs of aspiration were noted during this evaluation however, silent aspiration cannot be ruled out at bedside. If silent aspiration is suspected, a Videofluoroscopic Study of Swallowing (MBS) is recommended and requires a physician order. Cognition:   Confusion noted    Oral Peripheral Examination   Generalized oral weakness    Current Respiratory Status    room air     Parameters of Speech Production  Respiration:  Adequate for speech production  Quality:   Within functional limits  Intensity: Within functional limits    Volitional Swallow: present     Volitional Cough:   present     Pain: No pain reported. EDUCATION:   The Speech Language Pathologist (SLP) completed education regarding results of evaluation and that intervention is not warranted at this time. Learner: Patient  Education: Reviewed results and recommendations of this evaluation  Evaluation of Education:  Needs further instruction    This plan may be re-evaluated and revised as warranted. Evaluation Time includes thorough review of current medical information, gathering information on past medical history/social history and prior level of function, completion of standardized testing/informal observation of tasks, assessment of data and education on plan of care and goals. [x]The admitting diagnosis and active problem list, have been reviewed prior to initiation of this evaluation.         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

## 2022-06-14 NOTE — PROGRESS NOTES
Physical Therapy Initial Assessment     Name: Brice Denise  : 1934  MRN: [id number]      Date of Service: 2022    Evaluating PT:  Medardo Bosch PT, DPT [id number]    Room #:  3876/5470-C  Diagnosis:  Dizziness [R42]  Stroke-like symptoms [R29.90]  PMHx/PSHx:    Past Medical History:   Diagnosis Date    Cancer [facility])     colon neoplasma    Congenital absence of one kidney     birth    Hyperlipidemia     Hypertension     Kidney stones     PE (pulmonary thromboembolism) ([facility] [address]      Procedure/Surgery:  None this admission   Reason for admission: stroke-like symptoms  Precautions:  High fall risk, bilat custom braces  Equipment Needs:  TBD    SUBJECTIVE:  Pt lives in [facility] where he ambulates with rollator and has assistance with medication and meals. OBJECTIVE:   Initial Evaluation  Date:  Treatment Short Term/ Long Term   Goals   AM-PAC 6 Clicks      Was pt agreeable to Eval/treatment? Yes     Does pt have pain? no     Bed Mobility  Rolling: NT  Supine to sit: NT  Sit to supine: NT  Scooting: NT  Rolling: ind  Supine to sit: ind  Sit to supine: ind  Scooting: ind   Transfers Sit to stand: min A  Stand to sit: mod A  Stand pivot: NT  Sit to stand: ind  Stand to sit: ind  Stand pivot: ind FWW   Ambulation    40 feet with FWW min <>mod a  100 feet with FWW mod I   Stair negotiation: ascended and descended NT  TBD   ROM BUE:  Refer to OT eval   BLE:  WNL     Strength BUE:  Refer to OT eval   BLE:  WNL     Balance Sitting EOB:  SBA  Dynamic Standing:  Min <>mod A FWW  Sitting EOB:  ind  Dynamic Standing:   Mod I fww     Pt is A & O x 4  Sensation:  Pt notes neuropathy in bilat feet  Edema:  None noted     Vitals:  Blood Pressure at rest -- Blood Pressure post session -   Heart Rate at rest -- Heart Rate post session -   SPO2 at rest -- SPO2 post session -     Therapeutic Exercises:  none performed this visit    Patient education  Pt educated on role of PT, safety, rehab after DC    Patient response to education:   Pt verbalized understanding Pt demonstrated skill Pt requires further education in this area   x x      ASSESSMENT:  Conditions Requiring Skilled Therapeutic Intervention:  [x]Decreased strength     []Decreased ROM  [x]Decreased functional mobility  [x]Decreased balance   [x]Decreased endurance   []Decreased posture  [x]Decreased sensation  []Decreased coordination   []Decreased vision  []Decreased safety awareness   []Increased pain       Comments:  Pt seated EOB with nursing present on arrival. Pt agreeable to PT Evaluation. Pt notes his balance has been off recently which worries him as he does not want to fall at home. Pt also has custom foot braces to correct flat feet, diabetic shoes for neuropathy. Pt with no strength deficits in LE, able to stand from bed with little assist. Pt with multiple LOB ambulating short distances in room today with fluctuating assistance needed, min A for dynamic stability, mod A to correct LOB with FWW; ataxic gait noted throughout. Pt had major LOB upon pivoting to return to bed, able to correct by reaching for hand rail and with mod A from therapist to sit to bed. Pt educated extensively about safety in room, rehab after DC to improve strength and balance, and mobility with assistance while in hospital and pt was very receptive to information. Pt left EOB at end of session with call light in reach and all needs met, RN aware of pt EOB and pt performance in therapy. Pt will benefit from further PT treatment in order to address balance and strength deficits to prevent falls in [place]. Treatment:  Patient practiced and was instructed in the following treatment:     Bed mobility: performed with cues for safety awareness and proper hand placement to promote improved functional independence.  Transfer Training: from [employer]   Gait training: with FWW short distance      Pt's/ family goals   1. Return home safely.     Prognosis is good for reaching above PT goals. Patient and or family understand(s) diagnosis, prognosis, and plan of care. yes    PHYSICAL THERAPY PLAN OF CARE:    PT POC is established based on physician order and patient diagnosis     Referring provider/PT Order:    06/14/22 0715  PT eval and treat  Start:  06/14/22 0715,   End:  06/14/22 0715,   ONE TIME,   Standing Count:  1 Occurrences,   R         April Hever, APRN - CNP       Diagnosis:  Dizziness [R42]  Stroke-like symptoms [R29.90]  Specific instructions for next treatment:  Balance training, ambulation, strengthening     Current Treatment Recommendations:     [x] Strengthening to improve independence with functional mobility   [] ROM to improve independence with functional mobility   [x] Balance Training to improve static/dynamic balance and to reduce fall risk  [x] Endurance Training to improve activity tolerance during functional mobility   [x] Transfer Training to improve safety and independence with all functional transfers   [x] Gait Training to improve gait mechanics, endurance and assess need for appropriate assistive device  [x] Stair Training in preparation for safe discharge home and/or into the community   [x] Positioning to prevent skin breakdown and contractures  [x] Safety and Education Training   [x] Patient/Caregiver Education   [] HEP  [] Other     PT long term treatment goals are located in above grid    Frequency of treatments: 2-5x/week x 1-2 weeks. Time in  1555  Time out  1615    Total Treatment Time  0 minutes     Evaluation Time includes thorough review of current medical information, gathering information on past medical history/social history and prior level of function, completion of standardized testing/informal observation of tasks, assessment of data and education on plan of care and goals.     CPT codes:  [x] Low Complexity PT evaluation 52707  [] Moderate Complexity PT evaluation 77551  [] High Complexity PT evaluation 64176  [] PT Re-evaluation [id number]  [] Gait training 65093 -- minutes  [] Manual therapy 99795 -- minutes  [] Therapeutic activities 57919 -- minutes  [] Therapeutic exercises 51016 -- minutes  [] Neuromuscular reeducation 48621 -- minutes     Clark Weaver PT, DPT  [id number]

## 2022-06-14 NOTE — PROGRESS NOTES
[address]        [id number]                                                  Patient Name: Ankur Fisher    MRN: [id number]    : 1934    Room: [address] [place] [address]          Evaluating OT: Ben Islas OTR/L; [id number]       Referring Provider: GALLITO Lara CNP    Specific Provider Orders/Date: OT Eval and Treat 22       Diagnosis: Stroke-like symptoms. Pt c/o dizziness with pain behind R eye upon admission. Surgery: None this admission     Pertinent Medical History:  has a past medical history of Cancer ([facility] [address] Congenital absence of one kidney, Hyperlipidemia, Hypertension, Kidney stones, and PE (pulmonary thromboembolism) ([facility] [address]      Recommended Adaptive Equipment: TBD     Precautions:  Fall Risk, bilateral AFOs, diet easy to chew     Assessment of current deficits    [x] Functional mobility  [x]ADLs  [x] Strength               []Cognition    [x] Functional transfers   [x] IADLs         [x] Safety Awareness   [x]Endurance    [] Fine Coordination              [x] Balance      [] Vision/perception   []Sensation     []Gross Motor Coordination  [] ROM  [] Delirium                   [] Motor Control     OT PLAN OF CARE   OT POC based on physician orders, patient diagnosis and results of clinical assessment    Frequency/Duration 1-3 days/wk for 2 weeks PRN   Specific OT Treatment Interventions to include:   * Instruction/training on adapted ADL techniques and AE recommendations to increase functional independence within precautions       * Training on energy conservation strategies, correct breathing pattern and techniques to improve independence/tolerance for self-care routine  * Functional transfer/mobility training/DME recommendations for increased independence, safety, and fall prevention  * Patient/Family education to increase follow through with safety techniques and functional independence  * Recommendation of environmental modifications for increased safety with functional transfers/mobility and ADLs  * Therapeutic exercise to improve motor endurance, ROM, and functional strength for ADLs/functional transfers  * Therapeutic activities to facilitate/challenge dynamic balance, stand tolerance for increased safety and independence with ADLs  * Positioning to improve skin integrity, interaction with environment and functional independence    Modified Coker Scale   Score     Description  0             No symptoms  1             No significant disability despite symptoms  2             Slight disability; able to look after own affairs  3             Moderate disability; able to ambulate without assist/ requires assist with ADLs  4             Moderate/Severe disability;requires assist to ambulate/assist with ADLs  5             Severe disability;bedridden/incontinent   6               Score:   4    Home Living: Pt lives alone in [address] [address] (wife is in memory care unit at same place) with 1 step & 0 handrails to enter.    Bathroom setup: Walk-in shower with shower chair   Equipment owned: ww, shower chair    Prior Level of Function: IND with ADLs , assist with IADLs (facility provides meals); engaged in functional mobility with use of  ww  Driving: No  Occupation: None reported    Pain Level: Pt with no c/o pain throughout duration of session  Cognition: A&O: 4/4; Follows 2 step directions   Memory:  Good   Sequencing:  Fair+   Problem solving:  Fair+   Judgement/safety:  Fair     Functional Assessment:  AM-PAC Daily Activity Raw Score:    Initial Eval Status  Date: 22 Treatment Status  Date: STGs = LTGs  Time frame: 10-14 days   Feeding Stand by Assist   Independent    Grooming Minimal Assist   Modified [place]    UB Dressing Minimal Assist   Modified [place]    LB Dressing Moderate Assist   Modified [place]    Bathing Minimal Assist  Modified [place]    Toileting Minimal Assist   Modified [place]    Bed Mobility  Supine to sit: Minimal Assist   Sit to supine:NT  Supine to sit: Independent   Sit to supine: Independent    Functional Transfers Sit to stand:Minimal Assist   Stand to sit:Minimal Assist  Stand pivot: Minimal Assist  Commode: Minimal Assist  Sit to stand:Modified [place]    Stand to sit:Modified [place]   Stand pivot: Modified [place]   Commode: Modified [place]     Functional Mobility Minimal Assist  Use of ww within household distance  Modified [place] with Appropriate AD   Balance Sitting:     Static - Supervision     Dynamic - SBA  Standing: Minimal Assist  Sitting:     Static: Independent     Dynamic: Independent  Standing: Modified [place]    Activity Tolerance Fair  Good   Visual/  Perceptual Glasses: Yes  Visual tracking & perception appears WFL. Safety Fair  Good  during ADL completion     Hand Dominance Right   AROM (PROM) Strength Additional Info:  Goal:   RUE  WFL 4-/5 grossly tested good  and wfl FMC/dexterity noted during ADL tasks   Improve overall RUE strength WFL for participation in functional tasks       LUE WFL 4-/5 grossly tested Good  and wfl FMC/dexterity noted during ADL tasks   Improve overall LUE strength WFL for participation in functional tasks       Hearing: [facility]   Sensation:  No c/o numbness or tingling BUE  Tone: WFL BUE  Edema: Unremarkable    Comment: Cleared by RN to see pt. Upon arrival patient supine in bed and agreeable to OT session. At end of session transport in room to transfer pt to CAT scan. Overall patient demonstrated decreased independence and safety during completion of ADL/functional transfer/mobility tasks. Therapist facilitated ADL tasks, functional transfers, functional mobility, bed mobility to address safety awareness, implementation of fall prevention strategies, & engagement throughout functional tasks.  Pt c/o mod fatigue with light activity requiring rest breaks throughout duration of session - pt educated on EC/WS strategies to maintain safety & fall prevention strategies throughout daily activities. Pt would benefit from continued skilled OT to increase safety and independence with completion of ADL/IADL tasks for functional independence and quality of life. Treatment: OT treatment provided this date includes:    ADL-  Instruction/training on safety and adapted techniques for completion of ADLs: Pt sat EOB to don pants, AFOs, & shoes with assist/cuing for proper body mechanics & safety awareness. Pt requesting to utilize bathroom for BM - pt able to complete posterior hygiene care while seated. Pt c/o fatigue with light activity - pt educated on EC/WS strategies to maintain safety awareness & promote functional engagement throughout ADLs.   Mobility-  Instruction/training on safety and improved independence with bed mobility/functional transfers and functional mobility. Pt utilized ww to maintain dynamic standing balance throughout session - pt demo'd narrow TOMMY & shuffling gait pattern requiring assist/cues for proper body mechanics throughout functional transfers/mobility.   Sitting EOB ~10 minutes to improve dynamic sitting balance and activity tolerance during ADLs.   Activity tolerance- Instruction/training on energy conservation/work simplification for completion of ADLs: Pt easily fatigued with light activity requiring rest breaks throughout session. Pt educated on activity modifications/adaptations to address activity tolerance & implementation of fall prevention strategies.   Skilled positioning/alignment-  Proper Positioning/Alignment. Pt required cuing/assist to maintain proper body mechanics throughout session to promote skin/joint integrity, safety awareness, & implementation of fall prevention strategies throughout functional tasks.     Skilled monitoring of O2 sats-   Pt on RA upon arrival - pt SOB & easily fatigued with light activity. SpO2 98% & above throughout duration of session. Rehab Potential: Good for established goals     LTG: maximize independence with ADLs to return to PLOF    Patient and/or family were instructed on functional diagnosis, prognosis/goals and OT plan of care. Demonstrated fair understanding. Eval Complexity: Low  · History: Expanded chart review of medical records and additional review of physical, cognitive, or psychosocial history related to current functional performance  · Exam: 3+ performance deficits  · Assistance/Modification: Min/mod assistance or modifications required to perform tasks. May have comorbidities that affect occupational performance. Evaluation time includes thorough review of current medical information, gathering information on past medical & social history & PLOF, completion of standardized testing, informal observation of tasks, consultation with other medical professions/disciplines, assessment of data & development of POC/goals. Time In: 9:10a  Time Out: 9:40a  Total Treatment Time: 15 minutes    Min Units   OT Eval Low 28720  x     OT Eval Medium 64313      OT Eval High 51557      OT Re-Eval [id number]       Therapeutic Ex 21642       Therapeutic Activities 80544       ADL/Self Care 29642  15 1    Orthotic Management 15535       Manual [id number]     Neuro Re-Ed 71445       Non-Billable Time          Evaluation Time additionally includes thorough review of current medical information, gathering information on past medical history/social history and prior level of function, interpretation of standardized testing/informal observation of tasks, assessment of data and development of plan of care and goals.             Fidel Villarreal OTR/L; [id number]
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
## 2022-06-14 NOTE — ED NOTES
Pt changed new gown and sheets applied.  No concerns at this time     Katrin Manzo RN  06/14/22 8958

## 2022-06-14 NOTE — CARE COORDINATION
6/14 Care Coordination: Ptpresenting to the ED for eye pain. Patient woke up around 7 AM with pain behind his right eye along with what he describes ataxia. Was unable keep his balance. Pt is from [facility], [facility]. Neurology and cardio consults placed. For MRI. PT/OT/SLP consults. Spoke with Pt in his room. Discharge plan is back to [facility]. His daughter will Transport. His wife is also there in the Memory Unit. CM/SW will continue to follow for discharge planning.    Deborah MCDANIELS,RN--BC  [phone]

## 2022-06-15 ENCOUNTER — APPOINTMENT (OUTPATIENT)
Dept: MRI IMAGING | Age: 87
End: 2022-06-15
Payer: MEDICARE

## 2022-06-15 LAB
ANION GAP SERPL CALCULATED.3IONS-SCNC: 16 MMOL/L (ref 7–16)
BUN BLDV-MCNC: 34 MG/DL (ref 6–23)
C-REACTIVE PROTEIN: 0.3 MG/DL (ref 0–0.4)
CALCIUM SERPL-MCNC: 9.3 MG/DL (ref 8.6–10.2)
CHLORIDE BLD-SCNC: 99 MMOL/L (ref 98–107)
CHOLESTEROL, FASTING: 143 MG/DL (ref 0–199)
CO2: 21 MMOL/L (ref 22–29)
CREAT SERPL-MCNC: 1.5 MG/DL (ref 0.7–1.2)
GFR AFRICAN AMERICAN: 53
GFR NON-AFRICAN AMERICAN: 44 ML/MIN/1.73
GLUCOSE BLD-MCNC: 207 MG/DL (ref 74–99)
HBA1C MFR BLD: 8.2 % (ref 4–5.6)
HDLC SERPL-MCNC: 46 MG/DL
LDL CHOLESTEROL CALCULATED: 65 MG/DL (ref 0–99)
METER GLUCOSE: 155 MG/DL (ref 74–99)
METER GLUCOSE: 195 MG/DL (ref 74–99)
METER GLUCOSE: 210 MG/DL (ref 74–99)
METER GLUCOSE: 215 MG/DL (ref 74–99)
POTASSIUM REFLEX MAGNESIUM: 4.2 MMOL/L (ref 3.5–5)
SEDIMENTATION RATE, ERYTHROCYTE: 17 MM/HR (ref 0–15)
SODIUM BLD-SCNC: 136 MMOL/L (ref 132–146)
TRIGLYCERIDE, FASTING: 159 MG/DL (ref 0–149)
VLDLC SERPL CALC-MCNC: 32 MG/DL

## 2022-06-15 PROCEDURE — G0378 HOSPITAL OBSERVATION PER HR: HCPCS

## 2022-06-15 PROCEDURE — 2580000003 HC RX 258: Performed by: NURSE PRACTITIONER

## 2022-06-15 PROCEDURE — 99226 PR SBSQ OBSERVATION CARE/DAY 35 MINUTES: CPT | Performed by: NURSE PRACTITIONER

## 2022-06-15 PROCEDURE — 70551 MRI BRAIN STEM W/O DYE: CPT

## 2022-06-15 PROCEDURE — 86140 C-REACTIVE PROTEIN: CPT

## 2022-06-15 PROCEDURE — 80048 BASIC METABOLIC PNL TOTAL CA: CPT

## 2022-06-15 PROCEDURE — 36415 COLL VENOUS BLD VENIPUNCTURE: CPT

## 2022-06-15 PROCEDURE — 6370000000 HC RX 637 (ALT 250 FOR IP): Performed by: NURSE PRACTITIONER

## 2022-06-15 PROCEDURE — 6370000000 HC RX 637 (ALT 250 FOR IP): Performed by: INTERNAL MEDICINE

## 2022-06-15 PROCEDURE — 83036 HEMOGLOBIN GLYCOSYLATED A1C: CPT

## 2022-06-15 PROCEDURE — 80061 LIPID PANEL: CPT

## 2022-06-15 PROCEDURE — 85651 RBC SED RATE NONAUTOMATED: CPT

## 2022-06-15 PROCEDURE — 82962 GLUCOSE BLOOD TEST: CPT

## 2022-06-15 RX ADMIN — APIXABAN 2.5 MG: 2.5 TABLET, FILM COATED ORAL at 21:25

## 2022-06-15 RX ADMIN — METOPROLOL SUCCINATE 25 MG: 25 TABLET, EXTENDED RELEASE ORAL at 09:22

## 2022-06-15 RX ADMIN — PANTOPRAZOLE SODIUM 40 MG: 40 TABLET, DELAYED RELEASE ORAL at 06:27

## 2022-06-15 RX ADMIN — POLYVINYL ALCOHOL 1 DROP: 14 SOLUTION/ DROPS OPHTHALMIC at 09:22

## 2022-06-15 RX ADMIN — Medication 1 TABLET: at 09:22

## 2022-06-15 RX ADMIN — ATORVASTATIN CALCIUM 40 MG: 40 TABLET, FILM COATED ORAL at 21:26

## 2022-06-15 RX ADMIN — INSULIN LISPRO 2 UNITS: 100 INJECTION, SOLUTION INTRAVENOUS; SUBCUTANEOUS at 17:35

## 2022-06-15 RX ADMIN — ASPIRIN 81 MG: 81 TABLET, COATED ORAL at 09:20

## 2022-06-15 RX ADMIN — APIXABAN 5 MG: 5 TABLET, FILM COATED ORAL at 09:20

## 2022-06-15 RX ADMIN — INSULIN LISPRO 2 UNITS: 100 INJECTION, SOLUTION INTRAVENOUS; SUBCUTANEOUS at 09:22

## 2022-06-15 RX ADMIN — Medication 400 MG: at 09:20

## 2022-06-15 RX ADMIN — POLYVINYL ALCOHOL 1 DROP: 14 SOLUTION/ DROPS OPHTHALMIC at 14:45

## 2022-06-15 RX ADMIN — TAMSULOSIN HYDROCHLORIDE 0.4 MG: 0.4 CAPSULE ORAL at 09:22

## 2022-06-15 RX ADMIN — Medication 10 ML: at 09:23

## 2022-06-15 RX ADMIN — POLYVINYL ALCOHOL 1 DROP: 14 SOLUTION/ DROPS OPHTHALMIC at 20:30

## 2022-06-15 RX ADMIN — Medication 10 ML: at 22:32

## 2022-06-15 RX ADMIN — INSULIN LISPRO 4 UNITS: 100 INJECTION, SOLUTION INTRAVENOUS; SUBCUTANEOUS at 12:14

## 2022-06-15 RX ADMIN — FERROUS SULFATE TAB 325 MG (65 MG ELEMENTAL FE) 325 MG: 325 (65 FE) TAB at 09:22

## 2022-06-15 RX ADMIN — INSULIN LISPRO 2 UNITS: 100 INJECTION, SOLUTION INTRAVENOUS; SUBCUTANEOUS at 21:30

## 2022-06-15 ASSESSMENT — PAIN SCALES - GENERAL: PAINLEVEL_OUTOF10: 0

## 2022-06-15 NOTE — CONSULTS
Candice Allen is a 80 y.o. right handed man    Neuro was consulted for stroke like s/s    PMH: R medullary stroke; R VA occlusion, colon cancer, HTN, HLD, PE, congenital solitary kidney, CAD  SH: CABG, cholecystectomy, R colectomy, A. 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 He is on Eliquis for his AF and hx PE-- as well as high intensity statin. He had baseline deficits of intermittent R eye drooping, R sided clumsiness, and lateral gaze diplopia at his last visit. He presented to the ER from the nursing home on 6/13 after waking that morning with pain behind his R eye, lasted for about and difficulty keeping his balance. He states that when he woke up at he felt imbalanced while walking and that his right side was weaker than normal.  He typically ambulates with a rolling walker but was unable to do so without assistance. He told his daughter called ambulance. He is compliant with his anticoag. NIHSS was 0 on arrival. BP was 147/89 and he was in SB rhythm--rate in 46s. . Troponin was elevated at 32. Neuroimaging showed no acute stroke, ICH or LVO. He was not a candidate for tPA or IR. He still feels weaker on his right side, but better than before. He denies any vertigo, current headache, paresthesias, or other new neurologic symptoms. Illnesses. Does not know what his blood pressure runs at the [facility]. He states his sugars have been going up and down diabetes is not as well-controlled as a few years ago-A1c 8.2.   There is no family present at this time    MRI of the brain, echo are pending and cardio is following--his metoprolol dose was decreased    No Known Allergies    Current Facility-Administered Medications   Medication Dose Route Frequency Provider Last Rate Last Admin    sodium chloride flush 0.9 % injection 5-40 mL  5-40 mL IntraVENous 2 times per day April GALLITO Kathleen - CNP   10 mL at 06/14/22 2109    sodium chloride flush 0.9 % injection 5-40 mL  5-40 mL IntraVENous PRN April Hever, APRN - CNP   10 mL at 06/14/22 1001    0.9 % sodium chloride infusion   IntraVENous PRN April Hever, GALLITO - CNP        ondansetron (ZOFRAN-ODT) disintegrating tablet 4 mg  4 mg Oral Q8H PRN April GALLITO Kathleen - ZAIN        Or    ondansetron [facility]) injection 4 mg  4 mg IntraVENous Q6H PRN April GALLITO Kathleen - ZAIN        acetaminophen (TYLENOL) tablet 650 mg  650 mg Oral Q6H PRN April GALLITO Kathleen - CNP        Or    acetaminophen (TYLENOL) suppository 650 mg  650 mg Rectal Q6H PRN April MARTHA KathleenN - CNP        bisacodyl (DULCOLAX) suppository 10 mg  10 mg Rectal Daily PRN April Hever, APRN - CNP        [facility]) tablet 5 mg  5 mg Oral BID April GALLITO Kathleen - CNP   5 mg at 06/14/22 2107    ferrous sulfate (IRON 325) tablet 325 mg  325 mg Oral Daily with breakfast April GALLITO Kathleen - CNP   325 mg at 25/84/26 4865    folic acid-pyridoxine-cyancobalamin (FOLTX) 2.5-25-2 MG TABS 1 tablet  1 tablet Oral Daily April GALLITO Kathleen - CNP   1 tablet at 06/14/22 1127    glucose chewable tablet 16 g  4 tablet Oral PRN April MARTHA KathleenN - CNP        dextrose bolus 10% 125 mL  125 mL IntraVENous PRN April MARTHA KathleenN - CNP        Or    dextrose bolus 10% 250 mL  250 mL IntraVENous PRN April Hever, APRN - CNP        glucagon (rDNA) injection 1 mg  1 mg IntraMUSCular PRN April Hever, GALLITO - CNP        dextrose 5 % solution  100 mL/hr IntraVENous PRN April GALLITO Kathleen CNP        magnesium oxide (MAG-OX) tablet 400 mg  400 mg Oral Daily April GALLITO Kathleen CNP   400 mg at 06/14/22 0917    insulin lispro (HUMALOG) injection vial 0-12 Units  0-12 Units SubCUTAneous TID [facility] April CorrineRosedale, APRN - CNP   2 Units at 06/14/22 1632    insulin lispro (HUMALOG) injection vial 0-6 Units  0-6 Units SubCUTAneous Nightly April Storey-Simon, APRN - CNP   2 Units at 06/14/22 2109    multivitamin 1 tablet  1 tablet Oral Daily April Storey-Rosedale, APRN - CNP   1 tablet at 06/14/22 1128    tamsulosin (FLOMAX) capsule 0.4 mg  0.4 mg Oral Daily April Storey-Rosedale, APRN - CNP   0.4 mg at 06/14/22 0913    atorvastatin (LIPITOR) tablet 40 mg  40 mg Oral Nightly April Storey-Rosedale, APRN - CNP   40 mg at 06/14/22 2107    polyvinyl alcohol (LIQUIFILM TEARS) 1.4 % ophthalmic solution 1 drop  1 drop Right Eye [facility] April Storey-Simon, APRN - CNP        perflutren lipid microspheres (DEFINITY) injection 1.65 mg  1.5 mL IntraVENous ONCE PRN Berto Julissa Needy, DO        docusate sodium (COLACE) capsule 100 mg  100 mg Oral Nightly Berto Julissa Needy, DO        pantoprazole (PROTONIX) tablet 40 mg  40 mg Oral QAM AC Berto Julissa Needy, DO   40 mg at 06/15/22 6047    aspirin EC tablet 81 mg  81 mg Oral Daily Berto Julissa Needy, DO   81 mg at 06/14/22 1127    metoprolol succinate (TOPROL XL) extended release tablet 25 mg  25 mg Oral Daily Parviz Thakur MD         Objective:     /67   Pulse 59   Temp 98.1 °F (36.7 °C) (Temporal)   Resp 16   Ht 6' 1\" (1.854 m)   Wt 224 lb 12.8 oz (102 kg)   SpO2 93%   BMI 29.66 kg/m²     General appearance: alert, appears stated age, cooperative and no distress   Head: Normocephalic, atraumatic  Eyes: Sclera and conjunctivae clear  Neck: full ROM  Heart: bradycardic rate and rhythm; SB  Lungs: Clear throughout  Extremities: No cyanosis or edema  Pulses: 2+ throughout  Skin: no rashes or lesions    Mental Status: alert, oriented x4--very pleasant    Appropriate attention/concentration  Intact fundus of knowledge  Memories intact    Speech: no dysarthria  Language: no aphasias    Cranial Nerves:  I: smell    II: visual acuity   binocular diplopia with bilateral lateral gaze only   II: visual fields  full   II: pupils  PERRLA     III,VII: ptosis None today   III,IV,VI: extraocular muscles  EOMI; slight vertical and L gaze evoked nystagmus   V: mastication  normal   V: facial light touch sensation   normal   V,VII: corneal reflex     VII: facial muscle function - upper  Normal   VII: facial muscle function - lower Normal   VIII: hearing Normal   IX: soft palate elevation   normal   IX,X: gag reflex    XI: trapezius strength   5/5   XI: sternocleidomastoid strength  5/5   XI: neck extension strength   5/5   XII: tongue strength  Normal     Motor:  Mild R hemiparesis-worse in R leg--4+/5  5/5 L side  Normal bulk; increased tone in legs  No abnormal movements    Sensory:  LT symmetric in all limbs  Temp sensation intact in all limbs    Coordination:   FN, FFM normal b/l  NINOSKA slightly impaired on R  HS slightly impaired on R    Gait:  Slow and hesitant with walker; slight R hemiparesis    DTR:  1+ arms  BE legs  0 ankles    No hoffmans or other pathological reflexes    Laboratory/Radiology:     Lab Results   Component Value Date     06/15/2022    K 4.2 06/15/2022    CL 99 06/15/2022    CO2 21 (L) 06/15/2022    BUN 34 (H) 06/15/2022    CREATININE 1.5 (H) 06/15/2022    GLUCOSE 207 (H) 06/15/2022    CALCIUM 9.3 06/15/2022    PROT 6.6 06/13/2022    LABALBU 3.9 06/13/2022    BILITOT 0.6 06/13/2022    ALKPHOS 66 06/13/2022    AST 25 06/13/2022    ALT 23 06/13/2022    LABGLOM 44 06/15/2022    GFRAA 53 06/15/2022       Lab Results   Component Value Date    WBC 8.8 06/13/2022    HGB 12.7 06/13/2022    HCT 37.6 06/13/2022    MCV 88.3 06/13/2022     06/13/2022    LYMPHOPCT 15.1 (L) 06/13/2022    RBC 4.26 06/13/2022    MCH 29.8 06/13/2022    MCHC 33.8 06/13/2022    RDW 14.9 06/13/2022     Lab Results   Component Value Date    LABA1C 8.2 (H) 06/15/2022      Ref.  Range 6/15/2022 07:24 Cholesterol, Fasting Latest Ref Range: 0 - 199 mg/dL 143   HDL Cholesterol Latest Ref Range: >40 mg/dL 46   LDL Calculated Latest Ref Range: 0 - 99 mg/dL 65   Triglyceride, Fasting Latest Ref Range: 0 - 149 mg/dL 159 (H)   VLDL Cholesterol Calculated Latest Units: mg/dL 32     CTH: no acute abnormalities. Hypodensitities b/l cerebellum unchanged from 2019 scan. CTA head and neck: Occlusion of V4 segment of the right artery. 2. Mild stenoses in the cavernous ICAs bilaterally. CTA OF THE NECK:   1. Severe stenosis at the origin the right vertebral artery. 2. Approximately 50% stenosis at the origin the left ICA. 3. No significant stenosis in the right carotid or left vertebral arteries     MRI brain pending    Echo pending    All labs and images personally reviewed today    Assessment:     Stroke-like symptoms: At this time, his exam appears mostly back to his baseline deficits from his previous infarction, however with his chronic right vertebral artery occlusion we cannot rule out new area of ischemia, and MRI is pending. Must also consider stroke recrudescence from bradycardia or blood sugar fluctuations. With his retro-orbital HA, will check for temporal arteritis. Hx R lateral medullary stroke: secondary to R VA occlusion in a patient with A. Fib. A. fib: On [address]    History colon cancer    History of PE    Plan:     Await MRI of the brain    Check sed and CRP    Echo pending per primary    Continue anticoagulation + ASA for now    Continue high intensity statin    Control diabetes    Mod vasc risk factors: goal BP <140/90, LDL <70, A1C <7.0    Reviewed signs and symptoms of stroke including B.E.F.A.S. T:   Balance: Does the person have a sudden loss of balance? Eyes: Has the person lost vision in one or both eyes? Face: Does the person's face look uneven? Arm: Is one arm weaker or numb? Speech: Is the person's speech slurred? Does the person have trouble speaking or seen confused?    Time: Call 9-1-1 immediately.      Discussed with Dr. Leatha Silva    Will follow    GALLITO Thompson - CNP  9:25 AM  6/15/2022

## 2022-06-15 NOTE — CARE COORDINATION
6/15 Care Coordination: Cont workup. Await MRI Brain. Discharge plan remains back to [place] [facility], a TIFFANIE, Pt is Active with [facility] for  PT/OT. Will need RON orders when discharged. CM/SW will continue to follow for discharge planning.    Elma VALENTINN,RN--BC  [phone]

## 2022-06-15 NOTE — PLAN OF CARE
MRI reviewed and no areas of acute stroke or other worrisome findings.  Echo, sed and CRP still pending    Will follow up tomorrow    GALLITO Mahmood CNP  4:07 PM

## 2022-06-15 NOTE — PROGRESS NOTES
Hospitalist Progress Note      PCP: Sherman Davila MD    Date of Admission: 6/13/2022        Hospital Course:  **80 y.o. male presented with Fernando Aaron 12. HE SAID HE WAS FINE ON YESTERDAY, AND WHEN HE WENT TO GET UP  THIS MORNING HE COULD NOT MOVE, HAD PAIN BEHIND HIS RIGHT EYE. HE IS ON ELIQUIS , HAS HAD A STROKE WITH SIMILAR PRESENTATION IN THE PAST SPOKE WITH SON IN [place], UPDATED ON DADS CONDITION. NEURO TO SEE TODAY, AND MRI PENDING       Subjective: NO COMPLAINTS          Medications:  Reviewed    Infusion Medications    sodium chloride      dextrose       Scheduled Medications    sodium chloride flush  5-40 mL IntraVENous 2 times per day    apixaban  5 mg Oral BID    ferrous sulfate  325 mg Oral Daily with breakfast    folic acid-pyridoxine-cyancobalamin  1 tablet Oral Daily    magnesium oxide  400 mg Oral Daily    insulin lispro  0-12 Units SubCUTAneous TID WC    insulin lispro  0-6 Units SubCUTAneous Nightly    multivitamin  1 tablet Oral Daily    tamsulosin  0.4 mg Oral Daily    atorvastatin  40 mg Oral Nightly    polyvinyl alcohol  1 drop Right Eye Q6H WA    docusate sodium  100 mg Oral Nightly    pantoprazole  40 mg Oral QAM AC    aspirin  81 mg Oral Daily    metoprolol succinate  25 mg Oral Daily     PRN Meds: sodium chloride flush, sodium chloride, ondansetron **OR** ondansetron, acetaminophen **OR** acetaminophen, bisacodyl, glucose, dextrose bolus **OR** dextrose bolus, glucagon (rDNA), dextrose, perflutren lipid microspheres      Intake/Output Summary (Last 24 hours) at 6/15/2022 1443  Last data filed at 6/15/2022 1017  Gross per 24 hour   Intake 480 ml   Output --   Net 480 ml       Exam:    /67   Pulse 59   Temp 98.1 °F (36.7 °C) (Temporal)   Resp 16   Ht 6' 1\" (1.854 m)   Wt 224 lb 12.8 oz (102 kg)   SpO2 93%   BMI 29.66 kg/m²     General appearance:  No apparent distress,  HEENT:  Normal cephalic, atraumatic without obvious deformity  .  Neck: Supple, with full range of motion. No jugular venous   distention. Trachea midline. Respiratory:  Normal respiratory effort. Clear to auscultation,  Cardiovascular:  Regular rate and rhythm   Abdomen: Soft, non-tender, non-distended with normal bowel sounds. Musculoskeletal:  No clubbing, cyanosis or edema bilaterally. Skin: Skin color, texture, turgor normal.  No rashes or lesions. Neurologic:  Neurovascularly intact without any focal sensory/motor deficits. Cranial nerves: II-XII intact, grossly non-focal.  Psychiatric:  Alert and oriented,                  Labs:   Recent Labs     06/13/22  1431   WBC 8.8   HGB 12.7   HCT 37.6        Recent Labs     06/13/22  1431 06/15/22  0724    136   K 4.5 4.2    99   CO2 24 21*   BUN 39* 34*   CREATININE 1.5* 1.5*   CALCIUM 9.2 9.3     Recent Labs     06/13/22  1431   AST 25   ALT 23   BILITOT 0.6   ALKPHOS 66     Recent Labs     06/13/22  1431   INR 1.2     No results for input(s): Milan Altelle in the last 72 hours. Recent Labs     06/13/22  1431   AST 25   ALT 23   BILITOT 0.6   ALKPHOS 66     No results for input(s): LACTA in the last 72 hours.   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]      Priority: Medium    Nonrheumatic aortic valve insufficiency [I35.1]      Priority: Medium    Sinus bradycardia [R00.1]      Priority: Medium    Primary hypertension [I10]      Priority: Medium    Chronic anticoagulation [Z79.01]      Priority: Medium    Stage 3b chronic kidney disease ([facility] [N18.32]      Priority: Medium    Stroke-like symptoms [R29.90] 06/13/2022     Priority: Medium    History of CVA (cerebrovascular accident) [Z86.73] 06/13/2022     Priority: Medium    Elevated serum creatinine [R79.89] 06/13/2022     Priority: Medium    Elevated troponin [R77.8] 06/13/2022     Priority: Medium  HLD (hyperlipidemia) [E78.5] 06/13/2022     Priority: Medium    History of pulmonary embolism [Z86.711] 06/13/2022     Priority: Medium    Paroxysmal atrial fibrillation (HCC) [I48.0]     History of DVT (deep vein thrombosis) [Z86.718] 01/23/2017    Type 2 diabetes mellitus ([facility] [E11.9] 05/31/2013    Coronary artery disease involving native coronary artery of native heart [I25.10] 05/31/2013   JUSTO   CKD 3   II DM   ELEVATED TT   H/O PE   BPH     PLAN:  TOPROL  SSI  LIPITOR  NEURO  CARD   MRI     DVT Prophylaxis: *ELIQUIS  Diet: ADULT DIET; Easy to Chew; 5 carb choices (75 gm/meal)  Code Status: Full Code     PT/OT Eval Status: *ORDERED     Dispo - *HOME VS ZAN         Electronically signed by Edyta Stout DO on 6/15/2022 at 2:43 PM Alina Aly

## 2022-06-15 NOTE — PROGRESS NOTES
This patient is on medication that requires renal, weight, and/or indication dose adjustment. Date Body Weight IBW  Adjusted BW SCr  CrCl Dialysis status   6/15/2022 224 lb 12.8 oz (102 kg) Ideal body weight: 79.9 kg (176 lb 2.4 oz)  Adjusted ideal body weight: 88.7 kg (195 lb 9.7 oz) Serum creatinine: 1.5 mg/dL (H) 06/15/22 0724  Estimated creatinine clearance: 44 mL/min (A) N/a       Pharmacy has dose-adjusted the following medication(s):    Date Previous Order Adjusted Order   6/15/2022 Apixaban, (Eliquis) 5mg bid Apixaban (Eliquis) 2.5mg bid       These changes were made per protocol according to the [address] Clinical Guidance for Pharmacists. *Please note this dose may need readjusted if patient's condition changes. Please contact pharmacy with any questions regarding these changes.     Stephanie Reinoso, [facility]  6/15/2022  4:37 PM

## 2022-06-16 LAB
LV EF: 55 %
LVEF MODALITY: NORMAL
METER GLUCOSE: 163 MG/DL (ref 74–99)
METER GLUCOSE: 166 MG/DL (ref 74–99)
METER GLUCOSE: 224 MG/DL (ref 74–99)

## 2022-06-16 PROCEDURE — 6370000000 HC RX 637 (ALT 250 FOR IP): Performed by: NURSE PRACTITIONER

## 2022-06-16 PROCEDURE — 6370000000 HC RX 637 (ALT 250 FOR IP): Performed by: INTERNAL MEDICINE

## 2022-06-16 PROCEDURE — 82962 GLUCOSE BLOOD TEST: CPT

## 2022-06-16 PROCEDURE — G0378 HOSPITAL OBSERVATION PER HR: HCPCS

## 2022-06-16 PROCEDURE — 93306 TTE W/DOPPLER COMPLETE: CPT

## 2022-06-16 PROCEDURE — 2580000003 HC RX 258: Performed by: NURSE PRACTITIONER

## 2022-06-16 PROCEDURE — 99226 PR SBSQ OBSERVATION CARE/DAY 35 MINUTES: CPT | Performed by: NURSE PRACTITIONER

## 2022-06-16 RX ADMIN — POLYVINYL ALCOHOL 1 DROP: 14 SOLUTION/ DROPS OPHTHALMIC at 08:25

## 2022-06-16 RX ADMIN — ATORVASTATIN CALCIUM 40 MG: 40 TABLET, FILM COATED ORAL at 20:23

## 2022-06-16 RX ADMIN — Medication 400 MG: at 08:25

## 2022-06-16 RX ADMIN — PANTOPRAZOLE SODIUM 40 MG: 40 TABLET, DELAYED RELEASE ORAL at 06:20

## 2022-06-16 RX ADMIN — INSULIN LISPRO 2 UNITS: 100 INJECTION, SOLUTION INTRAVENOUS; SUBCUTANEOUS at 20:26

## 2022-06-16 RX ADMIN — Medication 1 TABLET: at 08:30

## 2022-06-16 RX ADMIN — Medication 10 ML: at 08:24

## 2022-06-16 RX ADMIN — Medication 10 ML: at 20:35

## 2022-06-16 RX ADMIN — ASPIRIN 81 MG: 81 TABLET, COATED ORAL at 08:25

## 2022-06-16 RX ADMIN — APIXABAN 2.5 MG: 2.5 TABLET, FILM COATED ORAL at 20:32

## 2022-06-16 RX ADMIN — TAMSULOSIN HYDROCHLORIDE 0.4 MG: 0.4 CAPSULE ORAL at 08:25

## 2022-06-16 RX ADMIN — POLYVINYL ALCOHOL 1 DROP: 14 SOLUTION/ DROPS OPHTHALMIC at 20:22

## 2022-06-16 RX ADMIN — APIXABAN 2.5 MG: 2.5 TABLET, FILM COATED ORAL at 08:25

## 2022-06-16 RX ADMIN — Medication 1 TABLET: at 08:25

## 2022-06-16 RX ADMIN — INSULIN LISPRO 2 UNITS: 100 INJECTION, SOLUTION INTRAVENOUS; SUBCUTANEOUS at 08:30

## 2022-06-16 RX ADMIN — DOCUSATE SODIUM 100 MG: 100 CAPSULE, LIQUID FILLED ORAL at 20:23

## 2022-06-16 RX ADMIN — FERROUS SULFATE TAB 325 MG (65 MG ELEMENTAL FE) 325 MG: 325 (65 FE) TAB at 08:25

## 2022-06-16 NOTE — PROGRESS NOTES
Oksana Swain is a 80 y.o. right handed man    Neuro was consulted for stroke like s/s    Sig PMH: R medullary stroke; R VA occlusion, colon cancer, HTN, HLD, PE, congenital solitary kidney, CAD, CABG, cholecystectomy, R colectomy, A. 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 He is on Eliquis for his AF and hx PE-- as well as high intensity statin. He had baseline deficits of intermittent R eye drooping, R sided clumsiness, and lateral gaze diplopia at his last visit. Presented on 6/13 with HA behind R eye and worsening balance. HR in the 50s. No LVO. Not tPA or IR candidate. Synopsis:  MRI was neg for acute stroke and sed and CRP ok. He feels almost back to his baseline with no new issues in the past 24 hours.  No family present    No chest pain or palpitations  No SOB  No vertigo, lightheadedness or loss of consciousness  No falls, tripping or stumbling  No incontinence of bowels or bladder  No itching or bruising appreciated  No speech or swallowing troubles    ROS otherwise negative     Current Facility-Administered Medications   Medication Dose Route Frequency Provider Last Rate Last Admin    apixaban (ELIQUIS) tablet 2.5 mg  2.5 mg Oral BID Rosmery Catherine DO   2.5 mg at 06/16/22 0825    sodium chloride flush 0.9 % injection 5-40 mL  5-40 mL IntraVENous 2 times per day April GALLITO Kathleen - CNP   10 mL at 06/16/22 0824    sodium chloride flush 0.9 % injection 5-40 mL  5-40 mL IntraVENous PRN April Hever, APRN - CNP   10 mL at 06/14/22 1001    0.9 % sodium chloride infusion   IntraVENous PRN April GALLITO Kathleen CNP        ondansetron (ZOFRAN-ODT) disintegrating tablet 4 mg  4 mg Oral Q8H PRN April GALLITO Kathleen CNP        Or    ondansetron [facility]) injection 4 mg  4 mg IntraVENous Q6H PRN April GALLITO Kathleen - CNP        acetaminophen (TYLENOL) tablet 650 mg  650 mg Oral Q6H PRN April GALLITO Kathleen CNP        Or    acetaminophen (TYLENOL) suppository 650 mg  650 mg Rectal Q6H PRN April GALLITO Kathleen CNP        bisacodyl (DULCOLAX) suppository 10 mg  10 mg Rectal Daily PRN April GALLITO Kathleen CNP        ferrous sulfate (IRON 325) tablet 325 mg  325 mg Oral Daily with breakfast April GALLITO Kathleen CNP   325 mg at 38/33/36 6267    folic acid-pyridoxine-cyancobalamin (FOLTX) 2.5-25-2 MG TABS 1 tablet  1 tablet Oral Daily April GALLITO Kathleen CNP   1 tablet at 06/16/22 0825    glucose chewable tablet 16 g  4 tablet Oral PRN April GALLITO Kathleen CNP        dextrose bolus 10% 125 mL  125 mL IntraVENous PRN April GALLITO Kathleen CNP        Or    dextrose bolus 10% 250 mL  250 mL IntraVENous PRN April GALLITO Kathleen CNP        glucagon (rDNA) injection 1 mg  1 mg IntraMUSCular PRN April GALLITO Kathleen CNP        dextrose 5 % solution  100 mL/hr IntraVENous PRN April GALLITO Kathleen CNP        magnesium oxide (MAG-OX) tablet 400 mg  400 mg Oral Daily April GALLITO Kathleen CNP   400 mg at 06/16/22 0825    insulin lispro (HUMALOG) injection vial 0-12 Units  0-12 Units SubCUTAneous TID [facility] April GALLITO Kathleen CNP   2 Units at 06/16/22 0830    insulin lispro (HUMALOG) injection vial 0-6 Units  0-6 Units SubCUTAneous Nightly April GALLITO Kathleen CNP   2 Units at 06/15/22 2130    multivitamin 1 tablet  1 tablet Oral Daily April GALLITO Kathleen CNP   1 tablet at 06/16/22 0830    tamsulosin (FLOMAX) capsule 0.4 mg  0.4 mg Oral Daily April GALLITO Kathleen CNP   0.4 mg at 06/16/22 0825    atorvastatin (LIPITOR) tablet 40 mg  40 mg Oral Nightly April GALLITO Kathleen CNP   40 mg at 06/15/22 2126    polyvinyl alcohol (LIQUIFILM TEARS) 1.4 % ophthalmic solution 1 drop  1 drop Right Eye Alfred Miller WaldoSimon, APRN - CNP   1 drop at 06/16/22 0825    perflutren lipid microspheres (DEFINITY) injection 1.65 mg  1.5 mL IntraVENous ONCE PRN Berto Nehemiah Peon, DO        docusate sodium (COLACE) capsule 100 mg  100 mg Oral Nightly Berto Nehemiah Peon, DO        pantoprazole (PROTONIX) tablet 40 mg  40 mg Oral QAM AC Berto Nehemiah Peon, DO   40 mg at 06/16/22 1449    aspirin EC tablet 81 mg  81 mg Oral Daily Berto Nehemiah Peon, DO   81 mg at 06/16/22 0825    metoprolol succinate (TOPROL XL) extended release tablet 25 mg  25 mg Oral Daily Maddie Loyd MD   25 mg at 06/15/22 7216     Objective:     BP (!) 133/58   Pulse 58   Temp 97.6 °F (36.4 °C) (Oral)   Resp 16   Ht 6' 1\" (1.854 m)   Wt 224 lb 6 oz (101.8 kg)   SpO2 96%   BMI 29.60 kg/m²     General appearance: alert, appears stated age, cooperative and no distress--lying in bed  Head: Normocephalic, atraumatic  Eyes: Sclera and conjunctivae clear  Neck: full ROM  Heart: RRR  Lungs: Clear throughout  Extremities: No cyanosis or edema  Pulses: 2+ throughout  Skin: no rashes or lesions    Mental Status: alert, oriented x4--very pleasant    Appropriate attention/concentration  Intact fundus of knowledge  Memories intact    Speech: no dysarthria  Language: no aphasias    Cranial Nerves:  I: smell    II: visual acuity   binocular diplopia with bilateral lateral gaze only   II: visual fields  full   II: pupils  PERRLA     III,VII: ptosis None today   III,IV,VI: extraocular muscles  EOMI; slight vertical and L gaze evoked nystagmus   V: mastication  normal   V: facial light touch sensation   normal   V,VII: corneal reflex     VII: facial muscle function - upper  Normal   VII: facial muscle function - lower Normal   VIII: hearing Normal   IX: soft palate elevation   normal   IX,X: gag reflex    XI: trapezius strength   5/5   XI: sternocleidomastoid strength  5/5   XI: neck extension strength   5/5   XII: tongue strength  Normal Motor:  5/5 throughout today  Normal bulk; increased tone in legs  No abnormal movements    Sensory:  LT symmetric in all limbs    Coordination:   FN, FFM normal b/l  NINOSKA slightly impaired on R  HS slightly impaired on R    DTR:  1+ arms  BE legs  0 ankles    No hoffmans or other pathological reflexes    Laboratory/Radiology:     Lab Results   Component Value Date     06/15/2022    K 4.2 06/15/2022    CL 99 06/15/2022    CO2 21 (L) 06/15/2022    BUN 34 (H) 06/15/2022    CREATININE 1.5 (H) 06/15/2022    GLUCOSE 207 (H) 06/15/2022    CALCIUM 9.3 06/15/2022    PROT 6.6 06/13/2022    LABALBU 3.9 06/13/2022    BILITOT 0.6 06/13/2022    ALKPHOS 66 06/13/2022    AST 25 06/13/2022    ALT 23 06/13/2022    LABGLOM 44 06/15/2022    GFRAA 53 06/15/2022       Lab Results   Component Value Date    WBC 8.8 06/13/2022    HGB 12.7 06/13/2022    HCT 37.6 06/13/2022    MCV 88.3 06/13/2022     06/13/2022    LYMPHOPCT 15.1 (L) 06/13/2022    RBC 4.26 06/13/2022    MCH 29.8 06/13/2022    MCHC 33.8 06/13/2022    RDW 14.9 06/13/2022     Lab Results   Component Value Date    LABA1C 8.2 (H) 06/15/2022      Ref. Range 6/15/2022 07:24   Cholesterol, Fasting Latest Ref Range: 0 - 199 mg/dL 143   HDL Cholesterol Latest Ref Range: >40 mg/dL 46   LDL Calculated Latest Ref Range: 0 - 99 mg/dL 65   Triglyceride, Fasting Latest Ref Range: 0 - 149 mg/dL 159 (H)   VLDL Cholesterol Calculated Latest Units: mg/dL 32     Lab Results   Component Value Date    SEDRATE 17 (H) 06/15/2022     Lab Results   Component Value Date    CRP 0.3 06/15/2022     CTH: no acute abnormalities. Hypodensitities b/l cerebellum unchanged from 2019 scan. CTA head and neck: Occlusion of V4 segment of the right artery. 2. Mild stenoses in the cavernous ICAs bilaterally. CTA OF THE NECK:   1. Severe stenosis at the origin the right vertebral artery. 2. Approximately 50% stenosis at the origin the left ICA.  3. No significant stenosis in the right carotid or left vertebral arteries     MRI brain WO: No acute intracranial abnormality. 2. Chronic infarctions in the right lateral medulla. Chronic lacunar infarctions in the bilateral cerebellar hemispheres. 3. 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 The degree of mass effect on the anterior 3rd ventricle and the foramina Edgardo Asael as mildly increased as of 01/20/2019. 4. No hydrocephalus     Echo pending    All labs and images personally reviewed today    Assessment:     Stroke-like symptoms: possible stroke recrudescence from HR or BGL fluctuations, but no acute stroke on MRI and his exam is back to baseline. No evidence of temporal arteritis. The cystic lesion noted on MRI is not felt to be contributing    Hx R lateral medullary stroke: secondary to R VA occlusion in a patient with A. Fib.  With mild, residual R hemiataxia and intermittent diplopia     A. fib: On [address]    History colon cancer    History of PE    Plan:     No further inpatient neuro workup planned    No need for ASA on top of Eliquis from neuro standpoint--since no new stroke    Echo per primary    Continue high intensity statin     Needs better DM control--goal A1C <7.0    Signing off--call with new issues    Follow up with me in the office in one month    GALLITO Andrade CNP  8:36 AM  6/16/2022

## 2022-06-17 VITALS
TEMPERATURE: 97.9 F | SYSTOLIC BLOOD PRESSURE: 107 MMHG | DIASTOLIC BLOOD PRESSURE: 59 MMHG | RESPIRATION RATE: 16 BRPM | HEIGHT: 73 IN | OXYGEN SATURATION: 97 % | WEIGHT: 224.38 LBS | HEART RATE: 63 BPM | BODY MASS INDEX: 29.74 KG/M2

## 2022-06-17 LAB
METER GLUCOSE: 175 MG/DL (ref 74–99)
METER GLUCOSE: 266 MG/DL (ref 74–99)

## 2022-06-17 PROCEDURE — 6370000000 HC RX 637 (ALT 250 FOR IP): Performed by: INTERNAL MEDICINE

## 2022-06-17 PROCEDURE — 82962 GLUCOSE BLOOD TEST: CPT

## 2022-06-17 PROCEDURE — 6370000000 HC RX 637 (ALT 250 FOR IP): Performed by: NURSE PRACTITIONER

## 2022-06-17 PROCEDURE — G0378 HOSPITAL OBSERVATION PER HR: HCPCS

## 2022-06-17 RX ORDER — ATORVASTATIN CALCIUM 40 MG/1
40 TABLET, FILM COATED ORAL NIGHTLY
Qty: 30 TABLET | Refills: 3 | Status: SHIPPED | OUTPATIENT
Start: 2022-06-17

## 2022-06-17 RX ORDER — ASPIRIN 81 MG/1
81 TABLET ORAL DAILY
Qty: 30 TABLET | Refills: 3 | Status: SHIPPED | OUTPATIENT
Start: 2022-06-17

## 2022-06-17 RX ADMIN — Medication 1 TABLET: at 10:40

## 2022-06-17 RX ADMIN — Medication 1 TABLET: at 10:38

## 2022-06-17 RX ADMIN — FERROUS SULFATE TAB 325 MG (65 MG ELEMENTAL FE) 325 MG: 325 (65 FE) TAB at 10:39

## 2022-06-17 RX ADMIN — INSULIN LISPRO 4 UNITS: 100 INJECTION, SOLUTION INTRAVENOUS; SUBCUTANEOUS at 10:41

## 2022-06-17 RX ADMIN — APIXABAN 2.5 MG: 2.5 TABLET, FILM COATED ORAL at 10:39

## 2022-06-17 RX ADMIN — ASPIRIN 81 MG: 81 TABLET, COATED ORAL at 10:39

## 2022-06-17 RX ADMIN — PANTOPRAZOLE SODIUM 40 MG: 40 TABLET, DELAYED RELEASE ORAL at 06:37

## 2022-06-17 RX ADMIN — TAMSULOSIN HYDROCHLORIDE 0.4 MG: 0.4 CAPSULE ORAL at 10:39

## 2022-06-17 RX ADMIN — POLYVINYL ALCOHOL 1 DROP: 14 SOLUTION/ DROPS OPHTHALMIC at 10:41

## 2022-06-17 RX ADMIN — Medication 400 MG: at 10:39

## 2022-06-17 NOTE — PROGRESS NOTES
Hospitalist Progress Note      PCP: Carlene Frankel, MD    Date of Admission: 6/13/2022        Hospital Course:  80 y. o. male presented with STROKE LIKE SYMPTOMS. HE SAID HE WAS FINE ON YESTERDAY, AND WHEN HE WENT TO GET UP  THIS MORNING HE COULD NOT MOVE, HAD PAIN BEHIND HIS RIGHT EYE.   HE IS ON ELIQUIS , HAS HAD A STROKE WITH SIMILAR PRESENTATION IN THE PAST **SPOKE WITH SON IN [facility] 6/15. COULD NOT REACH HIM TODAY  UPDATED ON DADS CONDITION.   NEURO TO SEE TODAY, AND MRI UNREMARKABLE, AWAITING ECHO   *        Subjective: **NO COMPLAINTS          Medications:  Reviewed    Infusion Medications    sodium chloride      dextrose       Scheduled Medications    apixaban  2.5 mg Oral BID    sodium chloride flush  5-40 mL IntraVENous 2 times per day    ferrous sulfate  325 mg Oral Daily with breakfast    folic acid-pyridoxine-cyancobalamin  1 tablet Oral Daily    magnesium oxide  400 mg Oral Daily    insulin lispro  0-12 Units SubCUTAneous TID WC    insulin lispro  0-6 Units SubCUTAneous Nightly    multivitamin  1 tablet Oral Daily    tamsulosin  0.4 mg Oral Daily    atorvastatin  40 mg Oral Nightly    polyvinyl alcohol  1 drop Right Eye Q6H WA    docusate sodium  100 mg Oral Nightly    pantoprazole  40 mg Oral QAM AC    aspirin  81 mg Oral Daily    metoprolol succinate  25 mg Oral Daily     PRN Meds: sodium chloride flush, sodium chloride, ondansetron **OR** ondansetron, acetaminophen **OR** acetaminophen, bisacodyl, glucose, dextrose bolus **OR** dextrose bolus, glucagon (rDNA), dextrose, perflutren lipid microspheres      Intake/Output Summary (Last 24 hours) at 6/16/2022 2156  Last data filed at 6/16/2022 1617  Gross per 24 hour   Intake 780 ml   Output --   Net 780 ml       Exam:    /70   Pulse 57   Temp 97.8 °F (36.6 °C) (Temporal)   Resp 18   Ht 6' 1\" (1.854 m)   Wt 224 lb 6 oz (101.8 kg)   SpO2 98%   BMI 29.60 kg/m²           Gen: **WELL DEVELOPED*  HEENT: NC/AT, moist mucous membranes, no oropharyngeal erythema or exudate  Neck: supple, trachea midline, no anterior cervical or SC LAD  Heart:  Normal s1/s2, RRR,  Lungs:  CTA bilaterally,  Abd: bowel sounds present, soft, nontender, nondistended, no masses  Extrem:  No clubbing, cyanosis,  *NO* edema  Skin: no rashes or lesions  Psych: A & O x3  Neuro: grossly intact, moves all four extremities. Labs:   No results for input(s): WBC, HGB, HCT, PLT in the last 72 hours. Recent Labs     06/15/22  0724      K 4.2   CL 99   CO2 21*   BUN 34*   CREATININE 1.5*   CALCIUM 9.3     No results for input(s): AST, ALT, BILIDIR, BILITOT, ALKPHOS in the last 72 hours. No results for input(s): INR in the last 72 hours. No results for input(s): Clemencia [place] in the last 72 hours. No results for input(s): AST, ALT, ALB, BILIDIR, BILITOT, ALKPHOS in the last 72 hours. No results for input(s): LACTA in the last 72 hours.   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]      Priority: Medium    Nonrheumatic aortic valve insufficiency [I35.1]      Priority: Medium    Sinus bradycardia [R00.1]      Priority: Medium    Primary hypertension [I10]      Priority: Medium    Chronic anticoagulation [Z79.01]      Priority: Medium    Stage 3b [facility] [N18.32]      Priority: Medium    Stroke-like symptoms [R29.90] 06/13/2022     Priority: Medium    History of CVA (cerebrovascular accident) [Z86.73] 06/13/2022     Priority: Medium    Elevated serum creatinine [R79.89] 06/13/2022     Priority: Medium    Elevated troponin [R77.8] 06/13/2022     Priority: Medium    HLD (hyperlipidemia) [E78.5] 06/13/2022     Priority: Medium    History of pulmonary embolism [Z86.711] 06/13/2022     Priority: Medium    Paroxysmal atrial fibrillation ([facility] [I48.0]     History of DVT (deep vein thrombosis) [Z86.718] 01/23/2017    Type 2 diabetes mellitus ([facility] [E11.9] 05/31/2013    Coronary artery disease involving native coronary artery of native heart [I25.10] 05/31/2013   JUSTO   CKD 3   II DM   ELEVATED TT   H/O PE   BPH     PLAN:  TOPROL  SSI  LIPITOR  NEURO  CARD        DVT Prophylaxis: *ELIQUIS  Diet: ADULT DIET; Easy to Chew; 5 carb choices (75 gm/meal)  Code Status: Full Code     PT/OT Eval Status: *ORDERED     Dispo - *HOME VS ZAN          Electronically signed by Pramod Villagran DO on 6/16/2022 at 9:56 PM Jayden salazar

## 2022-06-17 NOTE — PROGRESS NOTES
Hospitalist Progress Note      PCP: Alaina Soto MD    Date of Admission: 6/13/2022        Hospital Course:  **80 y. o. male presented with STROKE LIKE SYMPTOMS. HE SAID HE WAS FINE ON YESTERDAY, AND WHEN HE WENT TO GET UP  THIS MORNING HE COULD NOT MOVE, HAD PAIN BEHIND HIS RIGHT EYE.   HE IS ON ELIQUIS , HAS HAD A STROKE WITH SIMILAR PRESENTATION IN THE PAST **SPOKE WITH SON IN [facility] 6/15. COULD NOT REACH HIM TODAY  UPDATED ON DADS CONDITION.  NEURO TO SEE TODAY, AND MRI UNREMARKABLE, AWAITING ECHO  WHICH IS UNREMARKABLE. UNFORTUNATELY, HIS WIFE FELL AND FRACTURED HER HIP AND IS FOR SURGERY TODAY .  HE WILL DC WITH HIS FAMILY   **        Subjective: **NO COMPLAINTS          Medications:  Reviewed    Infusion Medications    sodium chloride      dextrose       Scheduled Medications    apixaban  2.5 mg Oral BID    sodium chloride flush  5-40 mL IntraVENous 2 times per day    ferrous sulfate  325 mg Oral Daily with breakfast    folic acid-pyridoxine-cyancobalamin  1 tablet Oral Daily    magnesium oxide  400 mg Oral Daily    insulin lispro  0-12 Units SubCUTAneous TID WC    insulin lispro  0-6 Units SubCUTAneous Nightly    multivitamin  1 tablet Oral Daily    tamsulosin  0.4 mg Oral Daily    atorvastatin  40 mg Oral Nightly    polyvinyl alcohol  1 drop Right Eye Q6H WA    docusate sodium  100 mg Oral Nightly    pantoprazole  40 mg Oral QAM AC    aspirin  81 mg Oral Daily    metoprolol succinate  25 mg Oral Daily     PRN Meds: sodium chloride flush, sodium chloride, ondansetron **OR** ondansetron, acetaminophen **OR** acetaminophen, bisacodyl, glucose, dextrose bolus **OR** dextrose bolus, glucagon (rDNA), dextrose, perflutren lipid microspheres      Intake/Output Summary (Last 24 hours) at 6/17/2022 1357  Last data filed at 6/17/2022 1019  Gross per 24 hour   Intake 540 ml   Output --   Net 540 ml       Exam:    BP (!) 107/59   Pulse 63   Temp 97.9 °F (36.6 °C) (Oral)   Resp 16   Ht 6' 1\" (1.854 m)   Wt 224 lb 6 oz (101.8 kg)   SpO2 97%   BMI 29.60 kg/m²           Gen: *WELL DEVELOPED  Neck: supple, trachea midline, no anterior cervical or SC LAD  HE IS ON THE COMMODE  Psych: A & O x3  Neuro: grossly intact, moves all four extremities. Labs:   No results for input(s): WBC, HGB, HCT, PLT in the last 72 hours. Recent Labs     06/15/22  0724      K 4.2   CL 99   CO2 21*   BUN 34*   CREATININE 1.5*   CALCIUM 9.3     No results for input(s): AST, ALT, BILIDIR, BILITOT, ALKPHOS in the last 72 hours. No results for input(s): INR in the last 72 hours. No results for input(s): Milan Altes in the last 72 hours. No results for input(s): AST, ALT, ALB, BILIDIR, BILITOT, ALKPHOS in the last 72 hours. No results for input(s): LACTA in the last 72 hours.   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.1]      Priority: Medium    Nonrheumatic aortic valve insufficiency [I35.1]      Priority: Medium    Sinus bradycardia [R00.1]      Priority: Medium    Primary hypertension [I10]      Priority: Medium    Chronic anticoagulation [Z79.01]      Priority: Medium    Stage 3b [facility] ([facility] [N18.32]      Priority: Medium    Stroke-like symptoms [R29.90] 06/13/2022     Priority: Medium    History of CVA (cerebrovascular accident) [Z86.73] 06/13/2022     Priority: Medium    Elevated serum creatinine [R79.89] 06/13/2022     Priority: Medium    Elevated troponin [R77.8] 06/13/2022     Priority: Medium    HLD (hyperlipidemia) [E78.5] 06/13/2022     Priority: Medium    History of pulmonary embolism [Z86.711] 06/13/2022     Priority: Medium    Paroxysmal atrial fibrillation (HCC) [I48.0]     History of DVT (deep vein thrombosis) [Z86.718] 01/23/2017    Type 2 diabetes mellitus ([facility] [E11.9] 05/31/2013    Coronary artery disease involving native coronary artery of native heart [I25.10] 05/31/2013      *JUSTO   CKD 3   II DM   ELEVATED TT   H/O PE   BPH    Plan:  * Courtney Hanna    Electronically signed by Reed Selby DO on 6/17/2022 at 1:57 PM Jayden salazar

## 2022-06-17 NOTE — PROGRESS NOTES
CLINICAL PHARMACY NOTE: MEDS TO BEDS    Total # of Prescriptions Filled: 3   The following medications were delivered to the patient:  · ELIQUIS 2.5 MG   · LIPITOR 40 MG   · ASPIRIN 81 MG     Additional Documentation:

## 2022-06-17 NOTE — PLAN OF CARE
Problem: Chronic Conditions and Co-morbidities  Goal: Patient's chronic conditions and co-morbidity symptoms are monitored and maintained or improved  6/16/2022 2111 by Paris Patel RN  Outcome: Progressing  Flowsheets  Taken 6/16/2022 1955 by Paris Patel RN  Care Plan - Patient's Chronic Conditions and Co-Morbidity Symptoms are Monitored and Maintained or Improved: Monitor and assess patient's chronic conditions and comorbid symptoms for stability, deterioration, or improvement  Taken 6/16/2022 0815 by Jovanni Myers RN  Care Plan - Patient's Chronic Conditions and Co-Morbidity Symptoms are Monitored and Maintained or Improved: Monitor and assess patient's chronic conditions and comorbid symptoms for stability, deterioration, or improvement     Problem: ABCDS Injury Assessment  Goal: Absence of physical injury  6/16/2022 2111 by Paris Patel RN  Outcome: Progressing  Flowsheets (Taken 6/16/2022 0815 by Jovanni Myers RN)  Absence of Physical Injury: Implement safety measures based on patient assessment

## 2022-06-27 NOTE — DISCHARGE SUMMARY
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]      Priority: Medium    Nonrheumatic aortic valve insufficiency [I35.1]      Priority: Medium    Sinus bradycardia [R00.1]      Priority: Medium    Primary hypertension [I10]      Priority: Medium    Chronic anticoagulation [Z79.01]      Priority: Medium    Stage 3b chronic kidney disease ([facility] [N18.32]      Priority: Medium    Stroke-like symptoms [R29.90] 2022     Priority: Medium    History of CVA (cerebrovascular accident) [Z86.73] 2022     Priority: Medium    Elevated serum creatinine [R79.89] 2022     Priority: Medium    Elevated troponin [R77.8] 2022     Priority: Medium    HLD (hyperlipidemia) [E78.5] 2022     Priority: Medium    History of pulmonary embolism [Z86.711] 2022     Priority: Medium    Paroxysmal atrial fibrillation (HCC) [I48.0]     History of DVT (deep vein thrombosis) [Z86.718] 2017    Type 2 diabetes mellitus ([facility] [E11.9] 2013    Coronary artery disease involving native coronary artery of native heart [I25.10] 2013    *JUSTO   CKD 3   II DM   ELEVATED TT   H/O PE   BPH        Hospital course in brief:    80 y. o. male presented with STROKE LIKE SYMPTOMS. HE SAID HE WAS FINE ON YESTERDAY, AND WHEN HE WENT TO GET UP  THIS MORNING HE COULD NOT MOVE, HAD PAIN BEHIND HIS RIGHT EYE.   HE IS ON ELIQUIS , HAS HAD A STROKE WITH SIMILAR PRESENTATION IN THE PAST **SPOKE WITH SON IN [place] 6/15.  COULD NOT REACH HIM TODAY  UPDATED ON DADS CONDITION.  NEURO TO SEE TODAY, AND MRI UNREMARKABLE, AWAITING ECHO  WHICH IS UNREMARKABLE. UNFORTUNATELY, HIS WIFE FELL AND FRACTURED HER HIP AND IS FOR SURGERY TODAY . HE WILL DC WITH HIS FAMILY   **    PHYSICAL EXAM:    BP (!) 107/59   Pulse 63   Temp 97.9 °F (36.6 °C) (Oral)   Resp 16   Ht 6' 1\" (1.854 m)   Wt 224 lb 6 oz (101.8 kg)   SpO2 97%   BMI 29.60 kg/m²     Gen: *WELL DEVELOPED  Neck: supple, trachea midline, no anterior cervical or SC LAD  HE IS ON THE COMMODE  Psych: A & O x3  Neuro: grossly intact, moves all four extremities.            Prior to Admission medications    Medication Sig Start Date End Date Taking?  Authorizing Provider   aspirin 81 MG EC tablet Take 1 tablet by mouth daily 6/17/22  Yes Violet Block, DO   apixaban (ELIQUIS) 2.5 MG TABS tablet Take 1 tablet by mouth 2 times daily 6/17/22  Yes Violet Block, DO   atorvastatin (LIPITOR) 40 MG tablet Take 1 tablet by mouth nightly 6/17/22  Yes Oumar Richards, DO   FIBER- MG tablet Take 2 tablets by mouth 3 times daily 6/7/22   Historical Provider, MD   SITagliptin (JANUVIA) 50 MG tablet Take 50 mg by mouth daily    Historical Provider, MD   diphenhydrAMINE (BENADRYL) 25 MG capsule Take 25 mg by mouth every 6 hours as needed for Itching    Historical Provider, MD   folic acid-pyridoxine-cyancobalamin (FOLBIC) 2.5-25-2 MG TABS Take 1 tablet by mouth daily    Historical Provider, MD   Multiple Vitamins-Minerals (CEROVITE SENIOR PO) Take by mouth Daily    Historical Provider, MD   REFRESH LIQUIGEL 1 % ophthalmic gel Place into the right eye 4 times daily 11/27/19   Historical Provider, MD   PROCTOZONE-HC 2.5 % rectal cream  11/18/19   Historical Provider, MD   fluorometholone (FML) 0.1 % ophthalmic suspension Place 1 drop into the right eye 2 times daily as needed    Historical Provider, MD   Propylene Glycol (SYSTANE BALANCE) 0.6 % SOLN Apply to eye as needed    Historical Provider, MD   MILK OF MAGNESIA 400 MG/5ML suspension as needed 3/14/19 Historical Provider, MD   ondansetron (ZOFRAN-ODT) 4 MG disintegrating tablet Take 1 tablet by mouth every 6 hours as needed 3/28/19   Historical Provider, MD   PREPARATION H 1-0.25-14.4-15 % CREA rectal cream Place rectally as needed 3/7/19   Historical Provider, MD   metoprolol succinate (TOPROL XL) 50 MG extended release tablet Take 1 tablet by mouth daily 5/7/19   Historical Provider, MD   tamsulosin (FLOMAX) 0.4 MG capsule Take 0.4 mg by mouth daily  10/2/17   Historical Provider, MD   ferrous sulfate 325 (65 Fe) MG tablet Take 325 mg by mouth daily (with breakfast)    Historical Provider, MD   acetaminophen (TYLENOL) 325 MG tablet Take 2 tablets by mouth every 4 hours as needed for Pain 8/1/17   Morro Potts MD   glipiZIDE (GLUCOTROL XL) 5 MG extended release tablet Take 5 mg by mouth daily    Historical Provider, MD   magnesium oxide (MAG-OX) 400 MG tablet Take 400 mg by mouth daily. Historical Provider, MD   Omega-3-acid Ethyl Esters (OMACOR PO) Take 1 g by mouth 3 times daily Last dose 4/7/17    Historical Provider, MD       Consults:   IP CONSULT TO HOSPITALIST  IP CONSULT TO SOCIAL WORK  IP CONSULT TO NEUROLOGY  IP CONSULT TO CARDIOLOGY            Discharge Instructions / Follow up:    Future Appointments   Date Time Provider Wyatt Luoi   12/1/2022 10:30 AM Dallas Mazariegos MD [address] [facility]       Continued appropriate risk factor modification of blood pressure, diabetes and serum lipids will remain essential to reducing risk of future atherosclerotic development    Activity: activity as tolerated    Significant labs:  CBC:   No results for input(s): WBC, RBC, HGB, HCT, MCV, RDW, PLT in the last 72 hours. BMP: No results for input(s): NA, K, CL, CO2, BUN, CREATININE, CA, MG, PHOS in the last 72 hours. LFT:  No results for input(s): PROT, ALB, ALKPHOS, ALT, AST, BILITOT, AMYLASE, LIPASE in the last 72 hours. PT/INR: No results for input(s): INR, APTT in the last 72 hours.   BNP: No results for input(s): BNP in the last 72 hours. Hgb A1C:   Lab Results   Component Value Date    LABA1C 8.2 (H) 06/15/2022     Folate and B12: No results found for: Kayaemeli Thompson, No results found for: FOLATE  Thyroid Studies:   Lab Results   Component Value Date    TSH 2.670 01/27/2019       Urinalysis:    Lab Results   Component Value Date    NITRU Negative 06/13/2022    WBCUA 10-20 10/28/2019    BACTERIA FEW 10/28/2019    RBCUA 1-3 10/28/2019    BLOODU Negative 06/13/2022    SPECGRAV 1.020 06/13/2022    GLUCOSEU Negative 06/13/2022       Imaging:  Echo Complete    Result Date: 6/16/2022  Transthoracic Echocardiography Report (TTE)  Demographics   Patient Name    Jeffery Aleman      Gender            Male                  [address]  [id number]     Room Number       8207  Number   Account #       [de-identified]    Procedure Date    06/16/2022   Corporate ID                 Ordering          [address]                               Physician   Accession       [id number]   Referring  Number                       Physician   Date of Birth   1934   Sonographer       Jean Camara [facility]   Age             80 year(s)   Interpreting      [address]                               Physician         Physician Cardiology                                                 Renetta Ansari MD                                Any Other  Procedure Type of Study   TTE procedure:Echo Complete W/Doppler & Color Flow. Procedure Date Date: 06/16/2022 Start: 02:47 PM Study Location: Portable Technical Quality: Adequate visualization Indications:CVA. Patient Status: Routine Contrast Medium: Bubble Study. Height: 73 inches Weight: 220 pounds BSA: 2.24 m^2 BMI: 29.03 kg/m^2 Rhythm: Within normal limits BP: 131/70 mmHg Allergies   - No known allergies. Findings   Left Ventricle  Left ventricle size is normal.  Moderate concentric left ventricular hypertrophy. Ejection fraction is visually estimated at 55%.   basal inferior wall hypokinesis There is doppler evidence of stage III diastolic dysfunction. No evidence of left ventricular mass or thrombus noted. Right Ventricle  Normal right ventricular size and function. Left Atrium  The left atrium is mild-moderately dilated. Interatrial septum appears intact. Agitated saline injected for shunt evaluation. Right Atrium  Mildly enlarged right atrium size. Mitral Valve  Mild mitral annular calcification. Mild mitral regurgitation is present. Mild mitral valve stenosis. Tricuspid Valve  The tricuspid valve appears structurally normal.  Mild tricuspid regurgitation. RVSP is 31 mmHg. Pulmonary hypertension is mild . Aortic Valve  The aortic valve appears mildly sclerotic. Aortic valve opens well. Mild  to moderate aortic regurgitation is noted. No hemodynamically significant  aortic stenosis is present. Pulmonic Valve  The pulmonic valve was not well visualized. Mild pulmonic regurgitation present. Pericardial Effusion  No evidence of pericardial effusion. Aorta  Aortic root within normal limits. Conclusions   Summary  Compared to prior echo, changes noted. Technically adequate study. Left ventricle size is normal.  Moderate concentric left ventricular hypertrophy. Ejection fraction is visually estimated at 55%. basal inferior wall hypokinesis  There is doppler evidence of stage III diastolic dysfunction. Mild mitral regurgitation is present. Mild mitral valve stenosis. Mild to moderate aortic regurgitation is noted. Mild tricuspid regurgitation. RVSP is 31 mmHg. Pulmonary hypertension is mild . Mild pulmonic regurgitation present.    Signature   ----------------------------------------------------------------  Electronically signed by Parviz Thakur MD(Interpreting  physician) on 06/16/2022 06:39 PM  ----------------------------------------------------------------  M-Mode/2D Measurements & Calculations   LV Diastolic    LV Systolic Dimension: 3.3  AV Cusp Separation: 1.5 cmLA Dimension: 4.4  cm                          Dimension: 4.9 cmAO Root  cm              LV Volume Diastolic: 88 ml  Dimension: 4.3 cm  LV FS:25 %      LV Volume Systolic: 22.2 ml  LV PW           LV EDV/LV EDV Index: 88  Diastolic: 1.5  RL/86 MG/Z^2DE ESV/LV ESV  cm              Index: 43.4 ml/19ml/ m^2    RV Diastolic Dimension: 3.5 cm  Septum          EF Calculated: 18.3 %  Diastolic: 1.4  LV Mass Index: 113          Ascending Aorta: 4.1 cm  cm              l/min*m^2                   LA volume/Index: 110.8 ml                  LV Length: 9.3 cm           /49.47ml/m^2  LV Mass: 253.39                             RA Area: 19.9 cm^2  g               LVOT: 2.1 cm  Doppler Measurements & Calculations   MV Peak E-Wave: 0.55 AV Peak Velocity: 1.34 LVOT Peak Velocity: 0.64 m/s  m/s                  m/s                    LVOT Mean Velocity: 0.43 m/s  MV Peak A-Wave: 0.25 AV Peak Gradient: 7.23 LVOT Peak Gradient: 1.6  m/s                  mmHg                   mmHgLVOT Mean Gradient: 0.8  MV E/A Ratio: 2.15   AV Mean Velocity: 0.92 mmHg  MV Peak Gradient: 3  m/s                    Estimated RVSP: 31.1 mmHg  mmHg                 AV Mean Gradient: 3.8  Estimated RAP:3 mmHg  MV Mean Gradient: 1  mmHg  mmHg                 AV VTI: 24.7 cm  MV Mean Velocity:    AV Area                TR Velocity:2.65 m/s  0.47 m/s             (Continuity):1.64 cm^2 TR Gradient:28.09 mmHg  MV Deceleration      AV Deceleration Time:  PV Peak Velocity: 0.63 m/s  Time: 395.8 msec     2459 msec              PV Peak Gradient: 1.57 mmHg  MV P1/2t: 166.1 msec LVOT VTI: 11.7 cm      PV Mean Velocity: 0.46 m/s  MVA by PHT:1.32 cm^2 IVRT: 23.1 msec        PV Mean Gradient: 0.9 mmHg  MV Area              Estimated PASP: 31.09  (continuity): 1.4    mmHg                   SC ED Velocity: 1.09 m/s  cm^2  MV E' Septal  Velocity: 0.04 m/s  MV E' Lateral  Velocity: 10 m/s [email or web address] [id number]    CT HEAD WO CONTRAST    Result Date: 6/13/2022  EXAMINATION: CT OF THE HEAD WITHOUT CONTRAST  6/13/2022 4:22 pm TECHNIQUE: CT of the head was performed without the administration of intravenous contrast. Automated exposure control, iterative reconstruction, and/or weight based adjustment of the mA/kV was utilized to reduce the radiation dose to as low as reasonably achievable. COMPARISON: February 21, 2019 HISTORY: ORDERING SYSTEM PROVIDED HISTORY: ataxia TECHNOLOGIST PROVIDED HISTORY: Has a \"code stroke\" or \"stroke alert\" been called? ->No Reason for exam:->ataxia Decision Support Exception - unselect if not a suspected or confirmed emergency medical condition->Emergency Medical Condition (MA) What reading provider will be dictating this exam?->CRC FINDINGS: BRAIN/VENTRICLES: There is no acute intracranial hemorrhage, mass effect or midline shift. No abnormal extra-axial fluid collection. The gray-white differentiation is maintained without evidence of an acute infarct. There is no evidence of hydrocephalus. 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 ORBITS: The visualized portion of the orbits demonstrate no acute abnormality. SINUSES: The visualized paranasal sinuses and mastoid air cells demonstrate no acute abnormality. SOFT TISSUES/SKULL:  No acute abnormality of the visualized skull or soft tissues. No acute intracranial abnormality or hemorrhage. Cortical atrophy and periventricular leukomalacia. XR CHEST PORTABLE    Result Date: 6/13/2022  EXAMINATION: ONE XRAY VIEW OF THE CHEST 6/13/2022 2:47 pm COMPARISON: None.  HISTORY: ORDERING SYSTEM PROVIDED HISTORY: weakness, Possible Stroke TECHNOLOGIST PROVIDED HISTORY: Reason for exam:->weakness, Possible Stroke What reading provider will be dictating this exam?->CRC FINDINGS: The cardiac silhouette is at upper limits of normal in size. Postop sternotomy changes are noted The lungs are clear. There is no focal infiltrate or effusion. 1. There is no acute cardiopulmonary disease. CTA NECK W CONTRAST    Result Date: 6/14/2022  EXAMINATION: CTA OF THE HEAD WITH CONTRAST; CTA OF THE NECK 6/14/2022 9:50 am: TECHNIQUE: CTA of the head/brain was performed with the administration of intravenous contrast. Multiplanar reformatted images are provided for review. MIP images are provided for review. Automated exposure control, iterative reconstruction, and/or weight based adjustment of the mA/kV was utilized to reduce the radiation dose to as low as reasonably achievable.; CTA of the neck was performed with the administration of intravenous contrast. Multiplanar reformatted images are provided for review. MIP images are provided for review. Stenosis of the internal carotid arteries measured using NASCET criteria. Automated exposure control, iterative reconstruction, and/or weight based adjustment of the mA/kV was utilized to reduce the radiation dose to as low as reasonably achievable. Noncontrast CT of the head with reconstructed 2-D images are also provided for review. COMPARISON: CTA of the head and neck, 01/27/2019. HISTORY: ORDERING SYSTEM PROVIDED HISTORY: Stroke like sympt TECHNOLOGIST PROVIDED HISTORY: Reason for exam:-> stroke like sympt Has a \"code stroke\" or \"stroke alert\" been called? ->No What reading provider will be dictating this exam?->CRC; ORDERING SYSTEM PROVIDED HISTORY: stroke like symptoms TECHNOLOGIST PROVIDED HISTORY: Reason for exam:->stroke like symptoms Has a \"code stroke\" or \"stroke alert\" been called? ->No What reading provider will be dictating this exam?->CRC FINDINGS: CT HEAD: BRAIN/VENTRICLES:  No mass effect, edema or hemorrhage is seen.   A 1.3 cm cystic lesion is seen arising from the level of the genu of the right internal capsule, exerting mass effect on the anterior 3rd ventricle. This finding demonstrates long-term stability at least as far back as 01/27/2019. Moderate volume loss is seen in the brain with mild-to-moderate chronic microvascular ischemic changes. No hydrocephalus or extra-axial fluid is seen. ORBITS: The visualized portion of the orbits demonstrate no acute abnormality. SINUSES:  The visualized paranasal sinuses and mastoid air cells demonstrate no acute abnormality. SOFT TISSUES/SKULL: No acute abnormality of the visualized skull or soft tissues. CTA NECK: AORTIC ARCH/ARCH VESSELS: No dissection or arterial injury. No significant stenosis of the brachiocephalic or subclavian arteries. CAROTID ARTERIES: Calcified atherosclerosis seen along carotid bulbs bilaterally greater than right. There is approximately 50% stenosis at the origin the left internal carotid artery. No significant stenosis is seen in the right internal carotid artery. VERTEBRAL ARTERIES: Severe focal stenosis is seen at the origin of right artery. The remainder of the right artery is patent with demonstrates somewhat attenuated flow related enhancement compared to contralateral side. . Intracranially, no flow related enhancement is seen in the V4 segment past the origin the PICA. The left vertebral artery is widely along its entire course. SOFT TISSUES: The lung apices are clear. No cervical or superior mediastinal lymphadenopathy. The larynx and pharynx are unremarkable. No acute abnormality of the salivary and thyroid glands. BONES: No acute osseous abnormality. CTA HEAD: ANTERIOR CIRCULATION: Calcified atherosclerosis in the cavernous ICAs resulting in mild stenoses. No significant stenosis seen in the anterior or middle cerebral arteries.   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 OTHER: No dural venous sinus thrombosis on this non-dedicated study. CT OF THE HEAD WITHOUT CONTRAST: 1. No acute intracranial abnormality. CTA OF THE HEAD: 1. 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 2. Mild stenoses in the cavernous ICAs bilaterally. CTA OF THE NECK: 1. Severe stenosis at the origin the right vertebral artery. 2. Approximately 50% stenosis at the origin the left ICA. 3. No significant stenosis in the right carotid or left vertebral arteries. RECOMMENDATIONS: Unavailable     CTA HEAD W CONTRAST    Result Date: 6/14/2022  EXAMINATION: CTA OF THE HEAD WITH CONTRAST; CTA OF THE NECK 6/14/2022 9:50 am: TECHNIQUE: CTA of the head/brain was performed with the administration of intravenous contrast. Multiplanar reformatted images are provided for review. MIP images are provided for review. Automated exposure control, iterative reconstruction, and/or weight based adjustment of the mA/kV was utilized to reduce the radiation dose to as low as reasonably achievable.; CTA of the neck was performed with the administration of intravenous contrast. Multiplanar reformatted images are provided for review. MIP images are provided for review. Stenosis of the internal carotid arteries measured using NASCET criteria. Automated exposure control, iterative reconstruction, and/or weight based adjustment of the mA/kV was utilized to reduce the radiation dose to as low as reasonably achievable. Noncontrast CT of the head with reconstructed 2-D images are also provided for review. COMPARISON: CTA of the head and neck, 01/27/2019. HISTORY: ORDERING SYSTEM PROVIDED HISTORY: Stroke like sympt TECHNOLOGIST PROVIDED HISTORY: Reason for exam:-> stroke like sympt Has a \"code stroke\" or \"stroke alert\" been called? ->No What reading provider will be dictating this exam?->CRC; ORDERING SYSTEM PROVIDED HISTORY: stroke like symptoms TECHNOLOGIST PROVIDED HISTORY: Reason for exam:->stroke like symptoms Has a \"code stroke\" or \"stroke alert\" been called? ->No What reading provider will be dictating this exam?->CRC FINDINGS: CT HEAD: BRAIN/VENTRICLES:  No mass effect, edema or hemorrhage is seen. A 1.3 cm cystic lesion is seen arising from the level of the genu of the right internal capsule, exerting mass effect on the anterior 3rd ventricle. This finding demonstrates long-term stability at least as far back as 01/27/2019. Moderate volume loss is seen in the brain with mild-to-moderate chronic microvascular ischemic changes. No hydrocephalus or extra-axial fluid is seen. ORBITS: The visualized portion of the orbits demonstrate no acute abnormality. SINUSES:  The visualized paranasal sinuses and mastoid air cells demonstrate no acute abnormality. SOFT TISSUES/SKULL: No acute abnormality of the visualized skull or soft tissues. CTA NECK: AORTIC ARCH/ARCH VESSELS: No dissection or arterial injury. No significant stenosis of the brachiocephalic or subclavian arteries. CAROTID ARTERIES: Calcified atherosclerosis seen along carotid bulbs bilaterally greater than right. There is approximately 50% stenosis at the origin the left internal carotid artery. No significant stenosis is seen in the right internal carotid artery. VERTEBRAL ARTERIES: Severe focal stenosis is seen at the origin of right artery. The remainder of the right artery is patent with demonstrates somewhat attenuated flow related enhancement compared to contralateral side. . Intracranially, no flow related enhancement is seen in the V4 segment past the origin the PICA. The left vertebral artery is widely along its entire course. SOFT TISSUES: The lung apices are clear. No cervical or superior mediastinal lymphadenopathy. The larynx and pharynx are unremarkable. No acute abnormality of the salivary and thyroid glands. BONES: No acute osseous abnormality. CTA HEAD: ANTERIOR CIRCULATION: Calcified atherosclerosis in the cavernous ICAs resulting in mild stenoses.   No significant stenosis seen in the anterior or middle cerebral arteries. 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 OTHER: No dural venous sinus thrombosis on this non-dedicated study. CT OF THE HEAD WITHOUT CONTRAST: 1. No acute intracranial abnormality. CTA OF THE HEAD: 1. 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 2. Mild stenoses in the cavernous ICAs bilaterally. CTA OF THE NECK: 1. Severe stenosis at the origin the right vertebral artery. 2. Approximately 50% stenosis at the origin the left ICA. 3. No significant stenosis in the right carotid or left vertebral arteries. RECOMMENDATIONS: Unavailable     MRI BRAIN WO CONTRAST    Result Date: 6/15/2022  EXAMINATION: MRI OF THE BRAIN WITHOUT CONTRAST  6/15/2022 1:46 pm TECHNIQUE: Multiplanar multisequence MRI of the brain was performed without the administration of intravenous contrast. COMPARISON: CT head and CTA of the head, 06/14/2022. MRI of the brain without contrast, 01/28/2019. HISTORY: ORDERING SYSTEM PROVIDED HISTORY: Stroke-Like Symptoms TECHNOLOGIST PROVIDED HISTORY: Reason for exam:->Stroke-Like Symptoms What is the sedation requirement?->None What reading provider will be dictating this exam?->CRC FINDINGS: INTRACRANIAL STRUCTURES/VENTRICLES: There is no acute infarct. Chronic infarction is seen along inferior dorsal lateral medulla on the right. Multiple chronic lacunar infarctions are seen in the bilateral cerebellar hemispheres, somewhat more prominent on the right. Arising from the level of the genu of the right internal capsule, there is a 1.8 x 1.4 cm cystic lesion, which has mildly enlarged since 01/20/2019, when it measured approximately 1.6 x 1.2 cm. On the current study, it exerts greater mass effect on the anterior 3rd ventricle and the foramina [place].  No hydrocephalus or extra-axial fluid is seen. Moderate volume loss is seen in the brain with mild microvascular ischemic changes ORBITS: A prosthetic lens is seen in the right globe. The orbits are otherwise unremarkable. SINUSES: Minimal mucosal thickening in the paranasal sinuses. The mastoids are clear. BONES/SOFT TISSUES: The bone marrow signal intensity appears normal. The soft tissues demonstrate no acute abnormality. 1.  No acute intracranial abnormality. 2. Chronic infarctions in the right lateral medulla. Chronic lacunar infarctions in the bilateral cerebellar hemispheres. 3. 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 The degree of mass effect on the anterior 3rd ventricle and the foramina Alvia Po as mildly increased as of 01/20/2019. 4. No hydrocephalus. Discharge Medications:      Medication List      START taking these medications    aspirin 81 MG EC tablet  Take 1 tablet by mouth daily     atorvastatin 40 MG tablet  Commonly known as: LIPITOR  Take 1 tablet by mouth nightly  Replaces: simvastatin 80 MG tablet        CHANGE how you take these medications    apixaban 2.5 MG Tabs tablet  Commonly known as: ELIQUIS  Take 1 tablet by mouth 2 times daily  What changed:   · medication strength  · how much to take     [address]  What changed: Another medication with the same name was removed. Continue taking this medication, and follow the directions you see here.         CONTINUE taking these medications    acetaminophen 325 MG tablet  Commonly known as: TYLENOL  Take 2 tablets by mouth every 4 hours as needed for Pain     diphenhydrAMINE 25 MG capsule  Commonly known as: BENADRYL     ferrous sulfate 325 (65 Fe) MG tablet  Commonly known as: IRON 325     Fiber-Lax 625 MG tablet  Generic drug: polycarbophil fluorometholone 0.1 % ophthalmic suspension  Commonly known as: FML     Folbic 2.5-25-2 MG Tabs  Generic drug: folic acid-pyridoxine-cyancobalamin     glipiZIDE 5 MG extended release tablet  Commonly known as: GLUCOTROL XL     Januvia 50 MG tablet  Generic drug: SITagliptin     magnesium oxide 400 MG tablet  Commonly known as: MAG-OX     metoprolol succinate 50 MG extended release tablet  Commonly known as: TOPROL XL     Milk of Magnesia 400 MG/5ML suspension  Generic drug: magnesium hydroxide     OMACOR PO     ondansetron 4 MG disintegrating tablet  Commonly known as: ZOFRAN-ODT     Preparation H 1-0.25-14.4-15 % cream  Generic drug: pramox-PE-glycerin-petrolatum     Proctozone-HC 2.5 % Crea rectal cream  Generic drug: hydrocortisone     Refresh Liquigel 1 % ophthalmic gel  Generic drug: carboxymethylcellulose     Systane Balance 0.6 % Soln  Generic drug: Propylene Glycol     tamsulosin 0.4 MG capsule  Commonly known as: FLOMAX        STOP taking these medications    enalapril 5 MG tablet  Commonly known as: VASOTEC     furosemide 20 MG tablet  Commonly known as: LASIX     simvastatin 80 MG tablet  Commonly known as: ZOCOR  Replaced by: atorvastatin 40 MG tablet           Where to Get Your Medications      These medications were sent to [facility] [address], [address] [address]    Phone: [phone]   · apixaban 2.5 MG Tabs tablet  · aspirin 81 MG EC tablet  · atorvastatin 40 MG tablet         Time Spent on discharge is more than 45 minutes in the examination, evaluation, counseling and review of medications and discharge plan.    +++++++++++++++++++++++++++++++++++++++++++++++++  Berto Medel, DO  [facility]  +++++++++++++++++++++++++++++++++++++++++++++++++  NOTE: This report was transcribed using voice recognition software.  Every effort was made to ensure accuracy; however, inadvertent computerized transcription errors may be present.

## 2022-07-13 PROBLEM — R79.89 ELEVATED TROPONIN: Status: RESOLVED | Noted: 2022-06-13 | Resolved: 2022-07-13

## 2022-07-13 PROBLEM — R77.8 ELEVATED TROPONIN: Status: RESOLVED | Noted: 2022-06-13 | Resolved: 2022-07-13

## 2022-08-08 ENCOUNTER — TELEPHONE (OUTPATIENT)
Dept: CARDIOLOGY CLINIC | Age: 87
End: 2022-08-08

## 2022-11-10 ENCOUNTER — HOSPITAL ENCOUNTER (INPATIENT)
Age: 87
LOS: 6 days | Discharge: HOME | DRG: 640 | End: 2022-11-16
Attending: EMERGENCY MEDICINE | Admitting: INTERNAL MEDICINE
Payer: MEDICARE

## 2022-11-10 ENCOUNTER — APPOINTMENT (OUTPATIENT)
Dept: CT IMAGING | Age: 87
DRG: 640 | End: 2022-11-10
Payer: MEDICARE

## 2022-11-10 ENCOUNTER — APPOINTMENT (OUTPATIENT)
Dept: GENERAL RADIOLOGY | Age: 87
DRG: 640 | End: 2022-11-10
Payer: MEDICARE

## 2022-11-10 DIAGNOSIS — J96.01 ACUTE RESPIRATORY FAILURE WITH HYPOXIA (HCC): Primary | ICD-10-CM

## 2022-11-10 PROBLEM — R06.02 SOB (SHORTNESS OF BREATH): Status: ACTIVE | Noted: 2022-11-10

## 2022-11-10 LAB
ALBUMIN SERPL-MCNC: 3.8 G/DL (ref 3.5–5.2)
ALP BLD-CCNC: 72 U/L (ref 40–129)
ALT SERPL-CCNC: 10 U/L (ref 0–40)
ANION GAP SERPL CALCULATED.3IONS-SCNC: 11 MMOL/L (ref 7–16)
AST SERPL-CCNC: 18 U/L (ref 0–39)
BASOPHILS ABSOLUTE: 0.04 E9/L (ref 0–0.2)
BASOPHILS RELATIVE PERCENT: 0.5 % (ref 0–2)
BILIRUB SERPL-MCNC: 0.8 MG/DL (ref 0–1.2)
BUN BLDV-MCNC: 16 MG/DL (ref 6–23)
CALCIUM SERPL-MCNC: 9.9 MG/DL (ref 8.6–10.2)
CHLORIDE BLD-SCNC: 99 MMOL/L (ref 98–107)
CO2: 28 MMOL/L (ref 22–29)
CREAT SERPL-MCNC: 1.4 MG/DL (ref 0.7–1.2)
EOSINOPHILS ABSOLUTE: 0.4 E9/L (ref 0.05–0.5)
EOSINOPHILS RELATIVE PERCENT: 5.1 % (ref 0–6)
GFR SERPL CREATININE-BSD FRML MDRD: 48 ML/MIN/1.73
GLUCOSE BLD-MCNC: 238 MG/DL (ref 74–99)
HCT VFR BLD CALC: 39.6 % (ref 37–54)
HEMOGLOBIN: 12.1 G/DL (ref 12.5–16.5)
IMMATURE GRANULOCYTES #: 0.01 E9/L
IMMATURE GRANULOCYTES %: 0.1 % (ref 0–5)
INFLUENZA A BY PCR: NOT DETECTED
INFLUENZA B BY PCR: NOT DETECTED
LYMPHOCYTES ABSOLUTE: 1.69 E9/L (ref 1.5–4)
LYMPHOCYTES RELATIVE PERCENT: 21.6 % (ref 20–42)
MCH RBC QN AUTO: 29.7 PG (ref 26–35)
MCHC RBC AUTO-ENTMCNC: 30.6 % (ref 32–34.5)
MCV RBC AUTO: 97.1 FL (ref 80–99.9)
METER GLUCOSE: 269 MG/DL (ref 74–99)
MONOCYTES ABSOLUTE: 0.69 E9/L (ref 0.1–0.95)
MONOCYTES RELATIVE PERCENT: 8.8 % (ref 2–12)
NEUTROPHILS ABSOLUTE: 5.01 E9/L (ref 1.8–7.3)
NEUTROPHILS RELATIVE PERCENT: 63.9 % (ref 43–80)
PDW BLD-RTO: 16.1 FL (ref 11.5–15)
PLATELET # BLD: 209 E9/L (ref 130–450)
PMV BLD AUTO: 9.9 FL (ref 7–12)
POTASSIUM SERPL-SCNC: 5 MMOL/L (ref 3.5–5)
PRO-BNP: 2625 PG/ML (ref 0–450)
RBC # BLD: 4.08 E12/L (ref 3.8–5.8)
SARS-COV-2, NAAT: NOT DETECTED
SODIUM BLD-SCNC: 138 MMOL/L (ref 132–146)
TOTAL PROTEIN: 7.2 G/DL (ref 6.4–8.3)
TROPONIN, HIGH SENSITIVITY: 35 NG/L (ref 0–11)
TROPONIN, HIGH SENSITIVITY: 36 NG/L (ref 0–11)
WBC # BLD: 7.8 E9/L (ref 4.5–11.5)

## 2022-11-10 PROCEDURE — 85025 COMPLETE CBC W/AUTO DIFF WBC: CPT

## 2022-11-10 PROCEDURE — 82962 GLUCOSE BLOOD TEST: CPT

## 2022-11-10 PROCEDURE — 6370000000 HC RX 637 (ALT 250 FOR IP): Performed by: NURSE PRACTITIONER

## 2022-11-10 PROCEDURE — 71046 X-RAY EXAM CHEST 2 VIEWS: CPT

## 2022-11-10 PROCEDURE — 87635 SARS-COV-2 COVID-19 AMP PRB: CPT

## 2022-11-10 PROCEDURE — 2060000000 HC ICU INTERMEDIATE R&B

## 2022-11-10 PROCEDURE — 80053 COMPREHEN METABOLIC PANEL: CPT

## 2022-11-10 PROCEDURE — 2580000003 HC RX 258: Performed by: EMERGENCY MEDICINE

## 2022-11-10 PROCEDURE — 6360000002 HC RX W HCPCS: Performed by: EMERGENCY MEDICINE

## 2022-11-10 PROCEDURE — 96375 TX/PRO/DX INJ NEW DRUG ADDON: CPT

## 2022-11-10 PROCEDURE — 2580000003 HC RX 258: Performed by: NURSE PRACTITIONER

## 2022-11-10 PROCEDURE — 87502 INFLUENZA DNA AMP PROBE: CPT

## 2022-11-10 PROCEDURE — 84484 ASSAY OF TROPONIN QUANT: CPT

## 2022-11-10 PROCEDURE — 71250 CT THORAX DX C-: CPT

## 2022-11-10 PROCEDURE — 96365 THER/PROPH/DIAG IV INF INIT: CPT

## 2022-11-10 PROCEDURE — 99285 EMERGENCY DEPT VISIT HI MDM: CPT

## 2022-11-10 PROCEDURE — 93005 ELECTROCARDIOGRAM TRACING: CPT | Performed by: EMERGENCY MEDICINE

## 2022-11-10 PROCEDURE — 83880 ASSAY OF NATRIURETIC PEPTIDE: CPT

## 2022-11-10 PROCEDURE — 2500000003 HC RX 250 WO HCPCS: Performed by: EMERGENCY MEDICINE

## 2022-11-10 PROCEDURE — 94640 AIRWAY INHALATION TREATMENT: CPT

## 2022-11-10 PROCEDURE — 84145 PROCALCITONIN (PCT): CPT

## 2022-11-10 RX ORDER — SODIUM CHLORIDE 0.9 % (FLUSH) 0.9 %
10 SYRINGE (ML) INJECTION PRN
Status: DISCONTINUED | OUTPATIENT
Start: 2022-11-10 | End: 2022-11-16 | Stop reason: HOSPADM

## 2022-11-10 RX ORDER — ACETAMINOPHEN 325 MG/1
650 TABLET ORAL EVERY 6 HOURS PRN
Status: DISCONTINUED | OUTPATIENT
Start: 2022-11-10 | End: 2022-11-16 | Stop reason: HOSPADM

## 2022-11-10 RX ORDER — MINERAL OIL, PETROLATUM 425; 573 MG/G; MG/G
OINTMENT OPHTHALMIC NIGHTLY
COMMUNITY

## 2022-11-10 RX ORDER — ASPIRIN 81 MG/1
81 TABLET ORAL DAILY
Status: DISCONTINUED | OUTPATIENT
Start: 2022-11-10 | End: 2022-11-16 | Stop reason: HOSPADM

## 2022-11-10 RX ORDER — AMOXICILLIN 500 MG
1200 CAPSULE ORAL DAILY
COMMUNITY

## 2022-11-10 RX ORDER — METOPROLOL SUCCINATE 50 MG/1
50 TABLET, EXTENDED RELEASE ORAL DAILY
Status: DISCONTINUED | OUTPATIENT
Start: 2022-11-10 | End: 2022-11-16 | Stop reason: HOSPADM

## 2022-11-10 RX ORDER — ONDANSETRON 2 MG/ML
4 INJECTION INTRAMUSCULAR; INTRAVENOUS EVERY 6 HOURS PRN
Status: DISCONTINUED | OUTPATIENT
Start: 2022-11-10 | End: 2022-11-16 | Stop reason: HOSPADM

## 2022-11-10 RX ORDER — SIMVASTATIN 80 MG
80 TABLET ORAL DAILY
COMMUNITY

## 2022-11-10 RX ORDER — METHYLPREDNISOLONE SODIUM SUCCINATE 125 MG/2ML
125 INJECTION, POWDER, LYOPHILIZED, FOR SOLUTION INTRAMUSCULAR; INTRAVENOUS ONCE
Status: COMPLETED | OUTPATIENT
Start: 2022-11-10 | End: 2022-11-10

## 2022-11-10 RX ORDER — INSULIN LISPRO 100 [IU]/ML
0-8 INJECTION, SOLUTION INTRAVENOUS; SUBCUTANEOUS
Status: DISCONTINUED | OUTPATIENT
Start: 2022-11-10 | End: 2022-11-11

## 2022-11-10 RX ORDER — ATORVASTATIN CALCIUM 40 MG/1
40 TABLET, FILM COATED ORAL NIGHTLY
Status: DISCONTINUED | OUTPATIENT
Start: 2022-11-10 | End: 2022-11-16 | Stop reason: HOSPADM

## 2022-11-10 RX ORDER — DEXTROSE MONOHYDRATE 100 MG/ML
INJECTION, SOLUTION INTRAVENOUS CONTINUOUS PRN
Status: DISCONTINUED | OUTPATIENT
Start: 2022-11-10 | End: 2022-11-16 | Stop reason: HOSPADM

## 2022-11-10 RX ORDER — FERROUS SULFATE 325(65) MG
325 TABLET ORAL
Status: DISCONTINUED | OUTPATIENT
Start: 2022-11-11 | End: 2022-11-16 | Stop reason: HOSPADM

## 2022-11-10 RX ORDER — ONDANSETRON 4 MG/1
4 TABLET, ORALLY DISINTEGRATING ORAL EVERY 8 HOURS PRN
Status: DISCONTINUED | OUTPATIENT
Start: 2022-11-10 | End: 2022-11-16 | Stop reason: HOSPADM

## 2022-11-10 RX ORDER — OMEPRAZOLE 40 MG/1
40 CAPSULE, DELAYED RELEASE ORAL DAILY
COMMUNITY

## 2022-11-10 RX ORDER — LOPERAMIDE HYDROCHLORIDE 2 MG/1
2 CAPSULE ORAL 4 TIMES DAILY PRN
COMMUNITY

## 2022-11-10 RX ORDER — ALUMINA, MAGNESIA, AND SIMETHICONE 2400; 2400; 240 MG/30ML; MG/30ML; MG/30ML
30 SUSPENSION ORAL EVERY 6 HOURS PRN
COMMUNITY

## 2022-11-10 RX ORDER — LORATADINE 10 MG/1
10 TABLET ORAL DAILY PRN
COMMUNITY

## 2022-11-10 RX ORDER — TAMSULOSIN HYDROCHLORIDE 0.4 MG/1
0.4 CAPSULE ORAL DAILY
Status: DISCONTINUED | OUTPATIENT
Start: 2022-11-10 | End: 2022-11-16 | Stop reason: HOSPADM

## 2022-11-10 RX ORDER — SODIUM CHLORIDE 0.9 % (FLUSH) 0.9 %
5-40 SYRINGE (ML) INJECTION EVERY 12 HOURS SCHEDULED
Status: DISCONTINUED | OUTPATIENT
Start: 2022-11-10 | End: 2022-11-16 | Stop reason: HOSPADM

## 2022-11-10 RX ORDER — VIT C/E/ZN/COPPR/LUTEIN/ZEAXAN 60 MG-6 MG
1 CAPSULE ORAL DAILY
COMMUNITY

## 2022-11-10 RX ORDER — ACETAMINOPHEN 650 MG/1
650 SUPPOSITORY RECTAL EVERY 6 HOURS PRN
Status: DISCONTINUED | OUTPATIENT
Start: 2022-11-10 | End: 2022-11-16 | Stop reason: HOSPADM

## 2022-11-10 RX ORDER — DOXYCYCLINE HYCLATE 100 MG/1
100 CAPSULE ORAL EVERY 12 HOURS SCHEDULED
Status: DISCONTINUED | OUTPATIENT
Start: 2022-11-10 | End: 2022-11-12

## 2022-11-10 RX ORDER — INSULIN LISPRO 100 [IU]/ML
0-4 INJECTION, SOLUTION INTRAVENOUS; SUBCUTANEOUS NIGHTLY
Status: DISCONTINUED | OUTPATIENT
Start: 2022-11-10 | End: 2022-11-11

## 2022-11-10 RX ORDER — SODIUM CHLORIDE 9 MG/ML
INJECTION, SOLUTION INTRAVENOUS PRN
Status: DISCONTINUED | OUTPATIENT
Start: 2022-11-10 | End: 2022-11-16 | Stop reason: HOSPADM

## 2022-11-10 RX ORDER — IPRATROPIUM BROMIDE AND ALBUTEROL SULFATE 2.5; .5 MG/3ML; MG/3ML
1 SOLUTION RESPIRATORY (INHALATION)
Status: DISCONTINUED | OUTPATIENT
Start: 2022-11-10 | End: 2022-11-16 | Stop reason: HOSPADM

## 2022-11-10 RX ADMIN — DOXYCYCLINE HYCLATE 100 MG: 100 CAPSULE ORAL at 20:30

## 2022-11-10 RX ADMIN — DOXYCYCLINE 100 MG: 100 INJECTION, POWDER, LYOPHILIZED, FOR SOLUTION INTRAVENOUS at 14:27

## 2022-11-10 RX ADMIN — APIXABAN 2.5 MG: 2.5 TABLET, FILM COATED ORAL at 20:30

## 2022-11-10 RX ADMIN — TAMSULOSIN HYDROCHLORIDE 0.4 MG: 0.4 CAPSULE ORAL at 20:07

## 2022-11-10 RX ADMIN — ASPIRIN 81 MG: 81 TABLET, COATED ORAL at 20:07

## 2022-11-10 RX ADMIN — INSULIN LISPRO 4 UNITS: 100 INJECTION, SOLUTION INTRAVENOUS; SUBCUTANEOUS at 20:41

## 2022-11-10 RX ADMIN — CEFTRIAXONE 1000 MG: 1 INJECTION, POWDER, FOR SOLUTION INTRAMUSCULAR; INTRAVENOUS at 14:28

## 2022-11-10 RX ADMIN — ATORVASTATIN CALCIUM 40 MG: 40 TABLET, FILM COATED ORAL at 20:08

## 2022-11-10 RX ADMIN — METOPROLOL SUCCINATE 50 MG: 50 TABLET, EXTENDED RELEASE ORAL at 20:08

## 2022-11-10 RX ADMIN — IPRATROPIUM BROMIDE AND ALBUTEROL SULFATE 1 AMPULE: 2.5; .5 SOLUTION RESPIRATORY (INHALATION) at 20:47

## 2022-11-10 RX ADMIN — METHYLPREDNISOLONE SODIUM SUCCINATE 125 MG: 125 INJECTION, POWDER, FOR SOLUTION INTRAMUSCULAR; INTRAVENOUS at 14:29

## 2022-11-10 RX ADMIN — SODIUM CHLORIDE, PRESERVATIVE FREE 10 ML: 5 INJECTION INTRAVENOUS at 20:21

## 2022-11-10 ASSESSMENT — PAIN - FUNCTIONAL ASSESSMENT
PAIN_FUNCTIONAL_ASSESSMENT: NONE - DENIES PAIN
PAIN_FUNCTIONAL_ASSESSMENT: NONE - DENIES PAIN

## 2022-11-10 NOTE — ED NOTES
Report called to [facility], pt to be transported upstairs.         Verdon Closs, RN  11/10/22 9799

## 2022-11-10 NOTE — ED PROVIDER NOTES
This is an 43-year-old with a past medical history of atrial fibrillation and PEs who presents to the ED for evaluation of shortness of breath. Patient is here with his daughter and states that he has been having increasing shortness of breath with exertion for the past 1 week. This does seem to improve with rest.  Patient is on Eliquis and has been compliant with the medication. No reported chest pain or new leg pain or leg swelling. No reported fevers but he does endorse a cough. Patient does not use oxygen at home. No other reported mitigating or exacerbating factors. The history is provided by the patient. Review of Systems   Constitutional:  Negative for fever. HENT:  Negative for congestion. Eyes:  Negative for visual disturbance. Respiratory:  Positive for shortness of breath. Negative for cough. Cardiovascular:  Negative for chest pain. Gastrointestinal:  Negative for abdominal pain. Endocrine: Negative for polyuria. Genitourinary:  Negative for dysuria. Musculoskeletal:  Negative for back pain. Skin:  Negative for rash. Allergic/Immunologic: Negative for immunocompromised state. Neurological:  Negative for headaches. Hematological:  Does not bruise/bleed easily. Psychiatric/Behavioral:  Negative for confusion. Physical Exam  Vitals and nursing note reviewed. Constitutional:       General: He is not in acute distress. Appearance: He is well-developed. HENT:      Head: Normocephalic and atraumatic. Mouth/Throat:      Mouth: Mucous membranes are moist.   Eyes:      Extraocular Movements: Extraocular movements intact. Pupils: Pupils are equal, round, and reactive to light. Neck:      Vascular: No JVD. Cardiovascular:      Rate and Rhythm: Normal rate and regular rhythm. Heart sounds: No murmur heard. Pulmonary:      Effort: Pulmonary effort is normal.      Breath sounds: No wheezing, rhonchi or rales.    Chest:      Chest wall: No tenderness. Abdominal:      General: There is no distension. Palpations: Abdomen is soft. Tenderness: There is no abdominal tenderness. There is no guarding or rebound. Hernia: No hernia is present. Musculoskeletal:      Cervical back: Normal range of motion and neck supple. Skin:     General: Skin is warm and dry. Capillary Refill: Capillary refill takes less than 2 seconds. Neurological:      General: No focal deficit present. Mental Status: He is alert and oriented to person, place, and time. Cranial Nerves: No cranial nerve deficit. Psychiatric:         Mood and Affect: Mood normal.         Behavior: Behavior normal.        Procedures     MDM  Number of Diagnoses or Management Options  Acute respiratory failure with hypoxia ([facility]  Diagnosis management comments: Patient presented to the ED for evaluation of shortness of breath was 88% with a good waveform had to be placed on submental oxygen. Patient is on Eliquis low suspicion for PE CT of the patient's chest did show interstitial edema versus pneumonia. Given patient's symptoms hypoxia he was covered with antibiotics for community-acquired pneumonia cardiac markers reassuring no new ischemic changes on EKG patient was admitted to medicine team for further evaluation                     --------------------------------------------- PAST HISTORY ---------------------------------------------  Past Medical History:  has a past medical history of A-fib ([facility] Acquired absence of kidney, Anemia, BPH (benign prostatic hyperplasia), Brainstem stroke ([facility] Colon cancer ([facility] Congenital absence of one kidney, DM (diabetes mellitus), type 2 ([facility] Dysphagia, GERD (gastroesophageal reflux disease), Hyperlipidemia, Hypertension, Kidney stones, Overactive bladder, PE (pulmonary thromboembolism) ([facility] SSS (sick sinus syndrome) ([facility] and Vertebral artery occlusion, right.     Past Surgical History:  has a past surgical history that includes Diagnostic Cardiac Cath Lab Procedure; joint replacement (Right, 2011); joint replacement (Left, 2012); Skin graft (Bilateral, 1950); Cholecystectomy (2007); Tonsillectomy; eye surgery; Colon surgery (Right, 11/07/2014); Coronary angioplasty (2004); Cystoscopy (04/12/2017); Cardiac catheterization (07/24/2017); and Coronary artery bypass graft. Social History:  reports that he quit smoking about 58 years ago. His smoking use included cigarettes. He has a 10.00 pack-year smoking history. He has never used smokeless tobacco. He reports current alcohol use. He reports that he does not use drugs. Family History: family history includes Heart Disease in his father. The patients home medications have been reviewed. Allergies: Patient has no known allergies.     -------------------------------------------------- RESULTS -------------------------------------------------    LABS:  Results for orders placed or performed during the hospital encounter of 11/10/22   COVID-19, Rapid    Specimen: Nasopharyngeal Swab   Result Value Ref Range    SARS-CoV-2, NAAT Not Detected Not Detected   RAPID INFLUENZA A/B ANTIGENS    Specimen: Nasopharyngeal   Result Value Ref Range    Influenza A by PCR Not Detected Not Detected    Influenza B by PCR Not Detected Not Detected   CMP   Result Value Ref Range    Sodium 138 132 - 146 mmol/L    Potassium 5.0 3.5 - 5.0 mmol/L    Chloride 99 98 - 107 mmol/L    CO2 28 22 - 29 mmol/L    Anion Gap 11 7 - 16 mmol/L    Glucose 238 (H) 74 - 99 mg/dL    BUN 16 6 - 23 mg/dL    Creatinine 1.4 (H) 0.7 - 1.2 mg/dL    Est, Glom Filt Rate 48 >=60 mL/min/1.73    Calcium 9.9 8.6 - 10.2 mg/dL    Total Protein 7.2 6.4 - 8.3 g/dL    Albumin 3.8 3.5 - 5.2 g/dL    Total Bilirubin 0.8 0.0 - 1.2 mg/dL    Alkaline Phosphatase 72 40 - 129 U/L    ALT 10 0 - 40 U/L    AST 18 0 - 39 U/L   CBC with Auto Differential   Result Value Ref Range    WBC 7.8 4.5 - 11.5 E9/L    RBC 4.08 3.80 - 5.80 E12/L Hemoglobin 12.1 (L) 12.5 - 16.5 g/dL    Hematocrit 39.6 37.0 - 54.0 %    MCV 97.1 80.0 - 99.9 fL    MCH 29.7 26.0 - 35.0 pg    MCHC 30.6 (L) 32.0 - 34.5 %    RDW 16.1 (H) 11.5 - 15.0 fL    Platelets 918 084 - 236 E9/L    MPV 9.9 7.0 - 12.0 fL    Neutrophils % 63.9 43.0 - 80.0 %    Immature Granulocytes % 0.1 0.0 - 5.0 %    Lymphocytes % 21.6 20.0 - 42.0 %    Monocytes % 8.8 2.0 - 12.0 %    Eosinophils % 5.1 0.0 - 6.0 %    Basophils % 0.5 0.0 - 2.0 %    Neutrophils Absolute 5.01 1.80 - 7.30 E9/L    Immature Granulocytes # 0.01 E9/L    Lymphocytes Absolute 1.69 1.50 - 4.00 E9/L    Monocytes Absolute 0.69 0.10 - 0.95 E9/L    Eosinophils Absolute 0.40 0.05 - 0.50 E9/L    Basophils Absolute 0.04 0.00 - 0.20 E9/L   Troponin   Result Value Ref Range    Troponin, High Sensitivity 36 (H) 0 - 11 ng/L   Brain Natriuretic Peptide   Result Value Ref Range    Pro-BNP 2,625 (H) 0 - 450 pg/mL   Troponin   Result Value Ref Range    Troponin, High Sensitivity 35 (H) 0 - 11 ng/L       RADIOLOGY:  CT CHEST WO CONTRAST   Final Result   There is enlargement of cardiac chambers with some ground-glass opacity   identified and interseptal thickening to suggest possible interstitial edema   or pneumonia. Clinical correlation is needed. No definite pleural effusion. XR CHEST (2 VW)   Final Result   Suspect early pulmonary vascular congestion.                 ------------------------- NURSING NOTES AND VITALS REVIEWED ---------------------------  Date / Time Roomed:  11/10/2022 11:05 AM  ED Bed Assignment:  [facility]-    The nursing notes within the ED encounter and vital signs as below have been reviewed.      Patient Vitals for the past 24 hrs:   BP Temp Temp src Pulse Resp SpO2 Height Weight   11/10/22 1339 (!) 134/94 -- -- 91 18 98 % -- --   11/10/22 1106 127/82 98.2 °F (36.8 °C) Oral 90 16 97 % 6' 1\" (1.854 m) 224 lb (101.6 kg)       Oxygen Saturation Interpretation: Abnormal    ------------------------------------------ PROGRESS NOTES ------------------------------------------  Re-evaluation(s):  Time: 344  Patients symptoms are improving  Repeat physical examination is improved    Counseling:  I have spoken with the patient and discussed todays results, in addition to providing specific details for the plan of care and counseling regarding the diagnosis and prognosis. Their questions are answered at this time and they are agreeable with the plan of admission.    --------------------------------- ADDITIONAL PROVIDER NOTES ---------------------------------  Consultations:  Spoke with [place] Discussed case. They will admit the patient. This patient's ED course included: a personal history and physicial examination, re-evaluation prior to disposition, multiple bedside re-evaluations, IV medications, cardiac monitoring, continuous pulse oximetry, and complex medical decision making and emergency management    This patient has remained hemodynamically stable during their ED course. Diagnosis:  1. Acute respiratory failure with hypoxia (HCC)        Disposition:  Patient's disposition: Admit to telemetry  Patient's condition is stable.          Ness Clark DO  11/11/22 1136

## 2022-11-10 NOTE — PROGRESS NOTES
Admission database completed to best of this RN's ability. Care plan and education initiated. Pt from [place] [facility]. Uses a [employer] with ambulation at baseline. Wears BLE braces. On RA at baseline.

## 2022-11-11 PROBLEM — J18.9 CAP (COMMUNITY ACQUIRED PNEUMONIA): Status: ACTIVE | Noted: 2022-11-11

## 2022-11-11 LAB
ANION GAP SERPL CALCULATED.3IONS-SCNC: 14 MMOL/L (ref 7–16)
BASOPHILS ABSOLUTE: 0.01 E9/L (ref 0–0.2)
BASOPHILS RELATIVE PERCENT: 0.1 % (ref 0–2)
BUN BLDV-MCNC: 20 MG/DL (ref 6–23)
CALCIUM SERPL-MCNC: 9.3 MG/DL (ref 8.6–10.2)
CHLORIDE BLD-SCNC: 95 MMOL/L (ref 98–107)
CO2: 23 MMOL/L (ref 22–29)
CREAT SERPL-MCNC: 1.3 MG/DL (ref 0.7–1.2)
EKG ATRIAL RATE: 111 BPM
EKG Q-T INTERVAL: 396 MS
EKG QRS DURATION: 96 MS
EKG QTC CALCULATION (BAZETT): 479 MS
EKG R AXIS: 45 DEGREES
EKG T AXIS: 36 DEGREES
EKG VENTRICULAR RATE: 88 BPM
EOSINOPHILS ABSOLUTE: 0 E9/L (ref 0.05–0.5)
EOSINOPHILS RELATIVE PERCENT: 0 % (ref 0–6)
GFR SERPL CREATININE-BSD FRML MDRD: 53 ML/MIN/1.73
GLUCOSE BLD-MCNC: 257 MG/DL (ref 74–99)
HBA1C MFR BLD: 8.7 % (ref 4–5.6)
HCT VFR BLD CALC: 36.7 % (ref 37–54)
HEMOGLOBIN: 11.8 G/DL (ref 12.5–16.5)
IMMATURE GRANULOCYTES #: 0.02 E9/L
IMMATURE GRANULOCYTES %: 0.3 % (ref 0–5)
L. PNEUMOPHILA SEROGP 1 UR AG: NORMAL
LYMPHOCYTES ABSOLUTE: 0.78 E9/L (ref 1.5–4)
LYMPHOCYTES RELATIVE PERCENT: 10.8 % (ref 20–42)
MCH RBC QN AUTO: 30.1 PG (ref 26–35)
MCHC RBC AUTO-ENTMCNC: 32.2 % (ref 32–34.5)
MCV RBC AUTO: 93.6 FL (ref 80–99.9)
METER GLUCOSE: 208 MG/DL (ref 74–99)
METER GLUCOSE: 224 MG/DL (ref 74–99)
METER GLUCOSE: 241 MG/DL (ref 74–99)
METER GLUCOSE: 365 MG/DL (ref 74–99)
MONOCYTES ABSOLUTE: 0.05 E9/L (ref 0.1–0.95)
MONOCYTES RELATIVE PERCENT: 0.7 % (ref 2–12)
NEUTROPHILS ABSOLUTE: 6.34 E9/L (ref 1.8–7.3)
NEUTROPHILS RELATIVE PERCENT: 88.1 % (ref 43–80)
PDW BLD-RTO: 15.9 FL (ref 11.5–15)
PLATELET # BLD: 206 E9/L (ref 130–450)
PMV BLD AUTO: 9.8 FL (ref 7–12)
POTASSIUM REFLEX MAGNESIUM: 4.8 MMOL/L (ref 3.5–5)
PROCALCITONIN: 0.06 NG/ML (ref 0–0.08)
PROCALCITONIN: 0.07 NG/ML (ref 0–0.08)
RBC # BLD: 3.92 E12/L (ref 3.8–5.8)
SODIUM BLD-SCNC: 132 MMOL/L (ref 132–146)
STREP PNEUMONIAE ANTIGEN, URINE: NORMAL
WBC # BLD: 7.2 E9/L (ref 4.5–11.5)

## 2022-11-11 PROCEDURE — 2700000000 HC OXYGEN THERAPY PER DAY

## 2022-11-11 PROCEDURE — 2060000000 HC ICU INTERMEDIATE R&B

## 2022-11-11 PROCEDURE — 84145 PROCALCITONIN (PCT): CPT

## 2022-11-11 PROCEDURE — 82962 GLUCOSE BLOOD TEST: CPT

## 2022-11-11 PROCEDURE — 80048 BASIC METABOLIC PNL TOTAL CA: CPT

## 2022-11-11 PROCEDURE — 36415 COLL VENOUS BLD VENIPUNCTURE: CPT

## 2022-11-11 PROCEDURE — 6370000000 HC RX 637 (ALT 250 FOR IP): Performed by: NURSE PRACTITIONER

## 2022-11-11 PROCEDURE — 87449 NOS EACH ORGANISM AG IA: CPT

## 2022-11-11 PROCEDURE — 94640 AIRWAY INHALATION TREATMENT: CPT

## 2022-11-11 PROCEDURE — 97161 PT EVAL LOW COMPLEX 20 MIN: CPT

## 2022-11-11 PROCEDURE — 92610 EVALUATE SWALLOWING FUNCTION: CPT | Performed by: SPEECH-LANGUAGE PATHOLOGIST

## 2022-11-11 PROCEDURE — 92526 ORAL FUNCTION THERAPY: CPT | Performed by: SPEECH-LANGUAGE PATHOLOGIST

## 2022-11-11 PROCEDURE — 83036 HEMOGLOBIN GLYCOSYLATED A1C: CPT

## 2022-11-11 PROCEDURE — 85025 COMPLETE CBC W/AUTO DIFF WBC: CPT

## 2022-11-11 PROCEDURE — 2500000003 HC RX 250 WO HCPCS: Performed by: INTERNAL MEDICINE

## 2022-11-11 PROCEDURE — 6360000002 HC RX W HCPCS: Performed by: NURSE PRACTITIONER

## 2022-11-11 PROCEDURE — 97165 OT EVAL LOW COMPLEX 30 MIN: CPT

## 2022-11-11 PROCEDURE — 93010 ELECTROCARDIOGRAM REPORT: CPT | Performed by: INTERNAL MEDICINE

## 2022-11-11 PROCEDURE — 6370000000 HC RX 637 (ALT 250 FOR IP): Performed by: FAMILY MEDICINE

## 2022-11-11 PROCEDURE — 2580000003 HC RX 258: Performed by: NURSE PRACTITIONER

## 2022-11-11 RX ORDER — BUMETANIDE 0.25 MG/ML
0.5 INJECTION, SOLUTION INTRAMUSCULAR; INTRAVENOUS 2 TIMES DAILY
Status: DISCONTINUED | OUTPATIENT
Start: 2022-11-11 | End: 2022-11-16 | Stop reason: HOSPADM

## 2022-11-11 RX ORDER — INSULIN LISPRO 100 [IU]/ML
0-16 INJECTION, SOLUTION INTRAVENOUS; SUBCUTANEOUS
Status: DISCONTINUED | OUTPATIENT
Start: 2022-11-11 | End: 2022-11-16 | Stop reason: HOSPADM

## 2022-11-11 RX ORDER — INSULIN LISPRO 100 [IU]/ML
0-4 INJECTION, SOLUTION INTRAVENOUS; SUBCUTANEOUS NIGHTLY
Status: DISCONTINUED | OUTPATIENT
Start: 2022-11-11 | End: 2022-11-16 | Stop reason: HOSPADM

## 2022-11-11 RX ADMIN — IPRATROPIUM BROMIDE AND ALBUTEROL SULFATE 1 AMPULE: 2.5; .5 SOLUTION RESPIRATORY (INHALATION) at 13:22

## 2022-11-11 RX ADMIN — BUMETANIDE 0.5 MG: 0.25 INJECTION INTRAMUSCULAR; INTRAVENOUS at 21:27

## 2022-11-11 RX ADMIN — SODIUM CHLORIDE, PRESERVATIVE FREE 10 ML: 5 INJECTION INTRAVENOUS at 08:38

## 2022-11-11 RX ADMIN — IPRATROPIUM BROMIDE AND ALBUTEROL SULFATE 1 AMPULE: 2.5; .5 SOLUTION RESPIRATORY (INHALATION) at 16:53

## 2022-11-11 RX ADMIN — FERROUS SULFATE TAB 325 MG (65 MG ELEMENTAL FE) 325 MG: 325 (65 FE) TAB at 08:30

## 2022-11-11 RX ADMIN — DOXYCYCLINE HYCLATE 100 MG: 100 CAPSULE ORAL at 08:31

## 2022-11-11 RX ADMIN — WATER 1000 MG: 1 INJECTION INTRAMUSCULAR; INTRAVENOUS; SUBCUTANEOUS at 15:40

## 2022-11-11 RX ADMIN — SODIUM CHLORIDE, PRESERVATIVE FREE 10 ML: 5 INJECTION INTRAVENOUS at 20:25

## 2022-11-11 RX ADMIN — ATORVASTATIN CALCIUM 40 MG: 40 TABLET, FILM COATED ORAL at 20:26

## 2022-11-11 RX ADMIN — IPRATROPIUM BROMIDE AND ALBUTEROL SULFATE 1 AMPULE: 2.5; .5 SOLUTION RESPIRATORY (INHALATION) at 20:41

## 2022-11-11 RX ADMIN — TAMSULOSIN HYDROCHLORIDE 0.4 MG: 0.4 CAPSULE ORAL at 08:31

## 2022-11-11 RX ADMIN — ASPIRIN 81 MG: 81 TABLET, COATED ORAL at 08:31

## 2022-11-11 RX ADMIN — APIXABAN 2.5 MG: 2.5 TABLET, FILM COATED ORAL at 20:26

## 2022-11-11 RX ADMIN — INSULIN LISPRO 16 UNITS: 100 INJECTION, SOLUTION INTRAVENOUS; SUBCUTANEOUS at 11:37

## 2022-11-11 RX ADMIN — IPRATROPIUM BROMIDE AND ALBUTEROL SULFATE 1 AMPULE: 2.5; .5 SOLUTION RESPIRATORY (INHALATION) at 09:10

## 2022-11-11 RX ADMIN — METOPROLOL SUCCINATE 50 MG: 50 TABLET, EXTENDED RELEASE ORAL at 08:30

## 2022-11-11 RX ADMIN — DOXYCYCLINE HYCLATE 100 MG: 100 CAPSULE ORAL at 20:25

## 2022-11-11 RX ADMIN — INSULIN LISPRO 4 UNITS: 100 INJECTION, SOLUTION INTRAVENOUS; SUBCUTANEOUS at 17:49

## 2022-11-11 RX ADMIN — INSULIN LISPRO 2 UNITS: 100 INJECTION, SOLUTION INTRAVENOUS; SUBCUTANEOUS at 08:32

## 2022-11-11 RX ADMIN — APIXABAN 2.5 MG: 2.5 TABLET, FILM COATED ORAL at 08:31

## 2022-11-11 ASSESSMENT — PAIN SCALES - GENERAL
PAINLEVEL_OUTOF10: 0
PAINLEVEL_OUTOF10: 0

## 2022-11-11 ASSESSMENT — ENCOUNTER SYMPTOMS
BACK PAIN: 0
COUGH: 0
SHORTNESS OF BREATH: 1
ABDOMINAL PAIN: 0
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
## 2022-11-11 NOTE — PROGRESS NOTES
SPEECH/LANGUAGE PATHOLOGY  CLINICAL ASSESSMENT OF SWALLOWING FUNCTION   and PLAN OF CARE    PATIENT NAME:  Sejal Choi  (male)     MRN:  [id number]    :  1934  (80 y.o.)  STATUS:  Inpatient: Room [facility]0647A    TODAY'S DATE:  2022  REFERRING PROVIDER:   11/10/22 1915    Speech Language Pathology clinical swallow screening  Start:  11/10/22 1915,   End:  11/10/22 1915,   ONE TIME,   Standing Count:  1 Occurrences,   R         Suzette Gowers, APRN - CNP   REASON FOR REFERRAL: assess for aspiration   EVALUATING THERAPIST: Mae Gerard, SLP                 RESULTS:    DYSPHAGIA DIAGNOSIS:   Clinical indicators of pharyngeal phase dysphagia including frequent overt s/s of aspiration with thin liquid      DIET RECOMMENDATIONS:  Regular consistency solids (IDDSI level 7) with nectar consistency (mildly thick - IDDSI level 2) liquids     MEDICATION ADMINISTRATION, Administer medication whole, ONE AT A TIME, with sips of thickened liquid OR Administer medication whole, ONE AT A TIME, in pudding/applesauce     FEEDING RECOMMENDATIONS:     Assistance level:  No assistance needed      Compensatory strategies recommended: Small bites/sips      Discussed recommendations with nursing and/or faxed report to referring provider: Yes    SPEECH THERAPY  PLAN OF CARE   The dysphagia POC is established based on physician order, dysphagia diagnosis and results of clinical assessment     Skilled SLP intervention for dysphagia management on acute care 3-5 x per week until goals met, pt plateaus in function and/or discharged from hospital    Conditions Requiring Skilled Therapeutic Intervention for dysphagia:    Patient is performing below functional baseline d/t  current acute condition, Multiple diagnoses, multiple medications, and increased dependency upon caregivers.   Coughing during PO intake      Specific dysphagia interventions to include:     Compensatory strategy training   Therapeutic exercises  Trials of upgraded diet/liquid     Specific instructions for next treatment:  therapeutic po trial to determine safety of advanced diet textures and consistencies, initiate instruction of therapeutic exercises , and initiate instruction of compensatory strategies  Patient Treatment Goals:    Short Term Goals:  Pt will implement identified compensatory swallowing strategies on 90% of opportunities or greater to improve airway protection and swallow function. Pt will complete PO trials of upgraded diet textures with SLP only to determine the least restrictive PO diet to maintain adequate nutrition/hydration with no more than 1 overt s/s of pen/asp. Pt will complete laryngeal strength/ ROM therapeutic exercises to improve airway protection for the least restrictive PO diet moderate verbal prompts    Long Term Goals:   Pt will maintain adequate nutrition/hydration via PO intake of the least restrictive oral diet with implementation of safe swallow/ compensatory strategies and decrease signs/symptoms of aspiration to less than 1 x/day. Pt will improve oropharyngeal swallow function to ensure airway protection during PO intake to maintain adequate nutrition/hydration and decrease signs/symptoms of aspiration to less than 1 x/day. Patient/family Goal:    Did not state. Will further assess during treatment.     Plan of care discussed with Patient   The Patient did not demonstrate complete understanding of the diagnosis, prognosis and plan of care     Rehabilitation Potential/Prognosis: fair                    ADMITTING DIAGNOSIS: SOB (shortness of breath) [R06.02]  Acute respiratory failure with hypoxia (HCC) [J96.01]    VISIT DIAGNOSIS:   Visit Diagnoses         Codes    Acute respiratory failure with hypoxia ([facility] [address]    -  Primary J96.01             PATIENT REPORT/COMPLAINT: denies difficulty swallowing  RN cleared patient for participation in assessment     yes     PRIOR LEVEL OF SWALLOW FUNCTION:    PAST HISTORY OF DYSPHAGIA?: none reported    Home diet: Regular consistency solids (IDDSI level 7) with  thin liquids (IDDSI level 0)  Current Diet Order:  ADULT DIET; Regular; 4 carb choices (60 gm/meal); Mildly Thick (Nectar)    PROCEDURE:  Consistencies Administered During the Evaluation   Liquids: thin liquid and nectar thick liquid   Solids:  pureed foods and solid foods      Method of Intake:   cup, straw, spoon  Self fed      Position:   Seated, upright    CLINICAL ASSESSMENT:  Oral Stage: The oral stage of swallowing was within functional limits for consistencies administered, however pt was observed to keep oral cavity slightly open during swallow. Trained to complete closure with swallow. Pharyngeal Stage:    Throat clearing present after presentation of thin liquid  Immediate wet cough was noted after presentation of thin liquid  Wet/gurgly vocal quality was noted after presentation of thin liquid    Cognition:   Follows 1 - step directions appropriate for this assessment    Oral Peripheral Examination   Adequate lingual/labial strength     Current Respiratory Status    room air     Parameters of Speech Production  Respiration:  Adequate for speech production  Quality:   Hoarse, high pitch  Intensity: Within functional limits    Volitional Swallow: present     Volitional Cough:   present     Pain: No pain reported. EDUCATION:   The Speech Language Pathologist (SLP) completed education regarding results of evaluation and that intervention is warranted at this time. Learner: Patient  Education: Reviewed results and recommendations of this evaluation  Evaluation of Education:  Needs further instruction, No evidence of learning, and Family not present    This plan may be re-evaluated and revised as warranted.       Evaluation Time includes thorough review of current medical information, gathering information on past medical history/social history and prior level of function, completion of standardized testing/informal observation of tasks, assessment of data and education on plan of care and goals. INTERVENTION    Pt/family educated on above results and plan of care. Pt/family trained on compensatory strategies for safe swallow and signs and symptoms of aspiration (throat clearing, coughing/choking, wet vocal quality. , etc.) and encouraged to notify staff if observed. Handouts provided as warranted. Pt/family encouraged to engage in question/answer session. All questions answered and pt/family verbalized understanding of above. [x]The admitting diagnosis and active problem list, have been reviewed prior to initiation of this evaluation. 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

## 2022-11-11 NOTE — CARE COORDINATION
Social Work / Discharge Planning : Patient admitted from [facility] at [facility] with SOB. Currently on 02 NEW. Goal to wean. Patient stated his plan to return to [facility].. His wife is there to. Patient uses his ww and states he doesn't take a step without it. Patient has therapy ordered and await therapy input to better asisst with discharge planning. Patient has PCP and insurance. Await treatment plan. SW to follow.  Electronically signed by MARISELA Martin on 11/11/22 at 11:07 AM EST

## 2022-11-11 NOTE — PROGRESS NOTES
SPEECH LANGUAGE PATHOLOGY  DAILY PROGRESS NOTE        PATIENT NAME:  Veronica Velazco      :  1934          TODAY'S DATE:  2022 ROOM:  [address] [place] [address]        SWALLOWING    Pt seen at bedside for a follow up to earlier evaluation. Family present and educated on eval results and thickener recommendations. Daughter reports hx of use of thickener and increase in coughing/choking observed recently with meals. Educated on use of thickener on d/c and handout provided. DIAGNOSIS:   Clinical indicators of pharyngeal phase dysphagia including frequent overt s/s of aspiration with thin liquid                          DIET RECOMMENDATIONS:  Regular consistency solids (IDDSI level 7) with nectar consistency (mildly thick - IDDSI level 2) liquids      MEDICATION ADMINISTRATION, Administer medication whole, ONE AT A TIME, with sips of thickened liquid OR Administer medication whole, ONE AT A TIME, in pudding/applesauce                FEEDING RECOMMENDATIONS:                         Assistance level:  No assistance needed                          Compensatory strategies recommended: Small bites/sips      Education- Pt educated on results and POC. Pt trained on compensatory strategies for safe swallow with good outcome. Questions answered. Will continue SP intervention as per previously established POC. Miguel RENEE CCC/SLP [id number]  Speech Pathologist              CPT code(s) 60765  dysphagia tx  Total minutes :  15 minutes

## 2022-11-11 NOTE — PROGRESS NOTES
Physical Therapy  Facility/Department: [facility]  Physical Therapy Initial Assessment    Name: Sejal Choi  : 1934  MRN: [id number]  Date of Service: 2022      Patient Diagnosis(es): The encounter diagnosis was Acute respiratory failure with hypoxia ([facility] [address] Past Medical History:  has a past medical history of A-fib ([address] Acquired absence of kidney, Anemia, BPH (benign prostatic hyperplasia), Brainstem stroke ([address] Colon cancer ([address] Congenital absence of one kidney, DM (diabetes mellitus), type 2 ([address] Dysphagia, GERD (gastroesophageal reflux disease), Hyperlipidemia, Hypertension, Kidney stones, Overactive bladder, PE (pulmonary thromboembolism) ([address] SSS (sick sinus syndrome) ([facility] [address] and Vertebral artery occlusion, right. Past Surgical History:  has a past surgical history that includes Diagnostic Cardiac Cath Lab Procedure; joint replacement (Right, ); joint replacement (Left, ); Skin graft (Bilateral, ); Cholecystectomy (); Tonsillectomy; eye surgery; Colon surgery (Right, 2014); Coronary angioplasty (); Cystoscopy (2017); Cardiac catheterization (2017); and Coronary artery bypass graft. Evaluating Therapist: Holland Bryson PT    Room #:  2606/8776-W  Diagnosis:  SOB (shortness of breath) [R06.02]  Acute respiratory failure with hypoxia (HCC) [J96.01]  PMHx/PSHx:  A-fib, Brainstem stroke, HTN, dysphagia  Precautions:  falls,       Social:  Pt admitted from [facility]. Ambulates with rollator. Pt has B AFO's but states does not always need to wear them. Initial Evaluation  Date: 22 Treatment      Short Term/ Long Term   Goals   Was pt agreeable to Eval/treatment? yes     Does pt have pain?  No c/o pain     Bed Mobility  Rolling: independent  Supine to sit: independent  Sit to supine: NT  Scooting: independent  independent   Transfers Sit to stand: SBA  Stand to sit: SBA  Stand pivot: SBA  independent   Ambulation    40 feet with ww with CG/SBA  150 feet with ww independenet   Stair Negotiation  Ascended and descended  NT   N/A   LE strength     B ankles 3/5 all else 3+/5    4/5   balance      Fair+ with ww     AM-PAC Raw score               18/24         Pt is alert and Oriented   LE ROM: WFL  Endurance: fair       ASSESSMENT:    Pt displays functional ability as noted in the objective portion of this evaluation. Patient education  Pt educated on PT objectives    Patient response to education:   Pt verbalized understanding Pt demonstrated skill Pt requires further education in this area   yes           Comments:  Pt has AFO's here with him but did not want to put them on. States only uses them if he is going to be up walking all day. Pt's/ family goals   1. To return to [place]    Conditions Requiring Skilled Therapeutic Intervention:    [x]Decreased strength     []Decreased ROM  [x]Decreased functional mobility  [x]Decreased balance   [x]Decreased endurance   []Decreased posture  []Decreased sensation  []Decreased coordination   []Decreased vision  []Decreased safety awareness   []Increased pain       Patient and or family understand(s) diagnosis, prognosis, and plan of care.     Prognosis is good for reaching above PT goals    PHYSICAL THERAPY PLAN OF CARE:    PT POC is established based on physician order and patient diagnosis     Referring provider/PT Order: GALLITO Hand CNP/ PT eval and treat      Current Treatment Recommendations:     [x] Strengthening to improve independence with functional mobility   [] ROM to improve independence with functional mobility   [x] Balance Training to improve static/dynamic balance and to reduce fall risk  [x] Endurance Training to improve activity tolerance during functional mobility   [x] Transfer Training to improve safety and independence with all functional transfers   [x] Gait Training to improve gait mechanics, endurance and assess need for appropriate assistive device  [] Stair Training in preparation for safe discharge home and/or into the community   [] Positioning to prevent skin breakdown and contractures  [x] Safety and Education Training   [x] Patient/Caregiver Education   [] HEP  [] Other     PT long term treatment goals are located in above grid    Frequency of treatments: 2-5x/week x 5 days. Time in  1125  Time out  1140      Evaluation Time includes thorough review of current medical information, gathering information on past medical history/social history and prior level of function, completion of standardized testing/informal observation of tasks, assessment of data and education on plan of care and goals.       CPT codes:  [x] Low Complexity PT evaluation 97275  [] Moderate Complexity PT evaluation 92491  [] High Complexity PT evaluation 37028  [] PT Re-evaluation 36789  [] Gait training 79550 minutes  [] Manual therapy 43407 minutes  [] Therapeutic activities 38936 minutes  [] Therapeutic exercises 75933 minutes  [] Neuromuscular reeducation 92799 minutes     [facility] PSY[facility]IATRY PT [id number]

## 2022-11-11 NOTE — PROGRESS NOTES
Robel Arrieta NP notified that patients blood sugar this afternoon 365 (ordered to call if blood sugar greater than 349 per standing order). Aware that patient received the 8U units of ordered coverage prior to lunch. Robel Arrieta NP states that she will place orders to adjust the sliding scale coverage.

## 2022-11-12 ENCOUNTER — APPOINTMENT (OUTPATIENT)
Dept: GENERAL RADIOLOGY | Age: 87
DRG: 640 | End: 2022-11-12
Payer: MEDICARE

## 2022-11-12 LAB
ANION GAP SERPL CALCULATED.3IONS-SCNC: 12 MMOL/L (ref 7–16)
BASOPHILS ABSOLUTE: 0.02 E9/L (ref 0–0.2)
BASOPHILS RELATIVE PERCENT: 0.2 % (ref 0–2)
BUN BLDV-MCNC: 30 MG/DL (ref 6–23)
CALCIUM SERPL-MCNC: 9.5 MG/DL (ref 8.6–10.2)
CHLORIDE BLD-SCNC: 98 MMOL/L (ref 98–107)
CO2: 26 MMOL/L (ref 22–29)
CREAT SERPL-MCNC: 1.3 MG/DL (ref 0.7–1.2)
EOSINOPHILS ABSOLUTE: 0.07 E9/L (ref 0.05–0.5)
EOSINOPHILS RELATIVE PERCENT: 0.6 % (ref 0–6)
GFR SERPL CREATININE-BSD FRML MDRD: 53 ML/MIN/1.73
GLUCOSE BLD-MCNC: 188 MG/DL (ref 74–99)
HCT VFR BLD CALC: 36.4 % (ref 37–54)
HEMOGLOBIN: 11.8 G/DL (ref 12.5–16.5)
IMMATURE GRANULOCYTES #: 0.05 E9/L
IMMATURE GRANULOCYTES %: 0.5 % (ref 0–5)
LYMPHOCYTES ABSOLUTE: 1.57 E9/L (ref 1.5–4)
LYMPHOCYTES RELATIVE PERCENT: 14.2 % (ref 20–42)
MCH RBC QN AUTO: 30.1 PG (ref 26–35)
MCHC RBC AUTO-ENTMCNC: 32.4 % (ref 32–34.5)
MCV RBC AUTO: 92.9 FL (ref 80–99.9)
METER GLUCOSE: 151 MG/DL (ref 74–99)
METER GLUCOSE: 194 MG/DL (ref 74–99)
METER GLUCOSE: 194 MG/DL (ref 74–99)
METER GLUCOSE: 217 MG/DL (ref 74–99)
MONOCYTES ABSOLUTE: 0.71 E9/L (ref 0.1–0.95)
MONOCYTES RELATIVE PERCENT: 6.4 % (ref 2–12)
NEUTROPHILS ABSOLUTE: 8.65 E9/L (ref 1.8–7.3)
NEUTROPHILS RELATIVE PERCENT: 78.1 % (ref 43–80)
PDW BLD-RTO: 16 FL (ref 11.5–15)
PLATELET # BLD: 214 E9/L (ref 130–450)
PMV BLD AUTO: 9.6 FL (ref 7–12)
POTASSIUM REFLEX MAGNESIUM: 4.5 MMOL/L (ref 3.5–5)
RBC # BLD: 3.92 E12/L (ref 3.8–5.8)
SODIUM BLD-SCNC: 136 MMOL/L (ref 132–146)
WBC # BLD: 11.1 E9/L (ref 4.5–11.5)

## 2022-11-12 PROCEDURE — 2580000003 HC RX 258: Performed by: NURSE PRACTITIONER

## 2022-11-12 PROCEDURE — 80048 BASIC METABOLIC PNL TOTAL CA: CPT

## 2022-11-12 PROCEDURE — 36415 COLL VENOUS BLD VENIPUNCTURE: CPT

## 2022-11-12 PROCEDURE — 6370000000 HC RX 637 (ALT 250 FOR IP): Performed by: NURSE PRACTITIONER

## 2022-11-12 PROCEDURE — 6370000000 HC RX 637 (ALT 250 FOR IP): Performed by: FAMILY MEDICINE

## 2022-11-12 PROCEDURE — 2700000000 HC OXYGEN THERAPY PER DAY

## 2022-11-12 PROCEDURE — 2060000000 HC ICU INTERMEDIATE R&B

## 2022-11-12 PROCEDURE — 94640 AIRWAY INHALATION TREATMENT: CPT

## 2022-11-12 PROCEDURE — 85025 COMPLETE CBC W/AUTO DIFF WBC: CPT

## 2022-11-12 PROCEDURE — 6370000000 HC RX 637 (ALT 250 FOR IP): Performed by: INTERNAL MEDICINE

## 2022-11-12 PROCEDURE — 2500000003 HC RX 250 WO HCPCS: Performed by: INTERNAL MEDICINE

## 2022-11-12 PROCEDURE — 71045 X-RAY EXAM CHEST 1 VIEW: CPT

## 2022-11-12 PROCEDURE — 82962 GLUCOSE BLOOD TEST: CPT

## 2022-11-12 RX ADMIN — IPRATROPIUM BROMIDE AND ALBUTEROL SULFATE 1 AMPULE: 2.5; .5 SOLUTION RESPIRATORY (INHALATION) at 12:55

## 2022-11-12 RX ADMIN — IPRATROPIUM BROMIDE AND ALBUTEROL SULFATE 1 AMPULE: 2.5; .5 SOLUTION RESPIRATORY (INHALATION) at 16:12

## 2022-11-12 RX ADMIN — IPRATROPIUM BROMIDE AND ALBUTEROL SULFATE 1 AMPULE: 2.5; .5 SOLUTION RESPIRATORY (INHALATION) at 21:09

## 2022-11-12 RX ADMIN — BUMETANIDE 0.5 MG: 0.25 INJECTION INTRAMUSCULAR; INTRAVENOUS at 08:58

## 2022-11-12 RX ADMIN — TAMSULOSIN HYDROCHLORIDE 0.4 MG: 0.4 CAPSULE ORAL at 08:58

## 2022-11-12 RX ADMIN — APIXABAN 2.5 MG: 2.5 TABLET, FILM COATED ORAL at 20:52

## 2022-11-12 RX ADMIN — INSULIN LISPRO 4 UNITS: 100 INJECTION, SOLUTION INTRAVENOUS; SUBCUTANEOUS at 17:05

## 2022-11-12 RX ADMIN — SODIUM CHLORIDE, PRESERVATIVE FREE 10 ML: 5 INJECTION INTRAVENOUS at 20:52

## 2022-11-12 RX ADMIN — BUMETANIDE 0.5 MG: 0.25 INJECTION INTRAMUSCULAR; INTRAVENOUS at 20:52

## 2022-11-12 RX ADMIN — METOPROLOL SUCCINATE 50 MG: 50 TABLET, EXTENDED RELEASE ORAL at 08:58

## 2022-11-12 RX ADMIN — ASPIRIN 81 MG: 81 TABLET, COATED ORAL at 08:58

## 2022-11-12 RX ADMIN — ATORVASTATIN CALCIUM 40 MG: 40 TABLET, FILM COATED ORAL at 20:52

## 2022-11-12 RX ADMIN — FERROUS SULFATE TAB 325 MG (65 MG ELEMENTAL FE) 325 MG: 325 (65 FE) TAB at 08:58

## 2022-11-12 RX ADMIN — APIXABAN 2.5 MG: 2.5 TABLET, FILM COATED ORAL at 08:58

## 2022-11-12 RX ADMIN — IPRATROPIUM BROMIDE AND ALBUTEROL SULFATE 1 AMPULE: 2.5; .5 SOLUTION RESPIRATORY (INHALATION) at 08:13

## 2022-11-12 RX ADMIN — SODIUM CHLORIDE, PRESERVATIVE FREE 10 ML: 5 INJECTION INTRAVENOUS at 08:58

## 2022-11-12 NOTE — PROGRESS NOTES
[facility]                                Progress note    Subjective: The patient is awake and alert. Now on room air   Feeling better today    Objective:    /72   Pulse 89   Temp 97.9 °F (36.6 °C) (Oral)   Resp 18   Ht 6' 1\" (1.854 m)   Wt 217 lb 9.6 oz (98.7 kg)   SpO2 95%   BMI 28.71 kg/m²     In: -   Out: 1300   In: -   Out: 1300 [Urine:1300]    General appearance: NAD, conversant  HEENT: AT/NC, MMM  Neck: FROM, supple  Lungs: Clear to auscultation  CV: RRR, no MRGs  Vasc: Radial pulses 2+  Abdomen: Soft, non-tender; no masses or HSM  Extremities: No peripheral edema or digital cyanosis  Skin: no rash, lesions or ulcers  Psych: Alert and oriented to person, place and time  Neuro: Alert and interactive     Recent Labs     11/10/22  1124 11/11/22  0210 11/12/22  0320   WBC 7.8 7.2 11.1   HGB 12.1* 11.8* 11.8*   HCT 39.6 36.7* 36.4*    206 214       Recent Labs     11/10/22  1124 11/11/22  0210 11/12/22  0320    132 136   K 5.0 4.8 4.5   CL 99 95* 98   CO2 28 23 26   BUN 16 20 30*   CREATININE 1.4* 1.3* 1.3*   CALCIUM 9.9 9.3 9.5       Assessment:    Principal Problem:    CAP (community acquired pneumonia)  Active Problems:    Diabetes ([facility] [address]  Resolved Problems:    * No resolved hospital problems. *      Plan:  59-year-old male with a history of hyperlipidemia and hypertension, atrial fibrillation on oral anticoagulation presents to the ED with shortness of breath and is admitted to telemetry unit with     Acute respiratory failure  -No evidence of pneumonia given normal white count, normal pro calcitonin  -Continue Rocephin and doxyclin but can likely be discontinued tomorrow Doxy  -Supplement O2 demands keeping oxygen saturation greater than 92%.   Currently utilizing 2 L O2  -Likely from volume overload-continue diuresis  -DuoNebs   -Swallow study questionable aspiration pneumonia-doubt  -Strep/Legionella  Respiratory panel  Repeat chest x-ray in a.m. to assess for volume overload  Check BNP in a.m., 2500 on admission, echocardiogram June 2022 EF 55%  Check echocardiogram if not recently done     Decreased blood pressure  BP meds with parameters  Caution with diuresis     Diabetes Mellitus-uncontrolled  -Monitor labs  -ISS glucose control  -Hypoglycemia protocol initiated  Increase sliding scale to high-dose for better glucose control. Hemoglobin A1c 8.7-likely medication noncompliance  For discharge      11/12/22:  -SLP eval with modified liquids to nectar with regular consistency diet.   -D/c abx as procal is negative   -Continue to diurese with 0.5 mg bumex BID, down 1.3 L  -Now Currently on room air, 95%   -Continue to monitor through tonight, if significant improvement by tomorrow and normal ambulating pulse ox, he can be discharged home      Code status: Full  Consultants: None   DVT Prophylaxis Eliquis   PT/OT  Discharge planning     Gasper Wade MD

## 2022-11-12 NOTE — PROGRESS NOTES
Pulse Ox room air @ rest: 91%    Pulse Ox room air with ambulation: 87%    Pulse Ox ambulating with O2 on 2 liters. Via nasal cannula is 91 %. After ambulation @ rest for recovery on 2 liters of O2  94%.

## 2022-11-13 LAB
ANION GAP SERPL CALCULATED.3IONS-SCNC: 11 MMOL/L (ref 7–16)
BASOPHILS ABSOLUTE: 0.06 E9/L (ref 0–0.2)
BASOPHILS RELATIVE PERCENT: 0.7 % (ref 0–2)
BUN BLDV-MCNC: 34 MG/DL (ref 6–23)
CALCIUM SERPL-MCNC: 9.2 MG/DL (ref 8.6–10.2)
CHLORIDE BLD-SCNC: 94 MMOL/L (ref 98–107)
CO2: 29 MMOL/L (ref 22–29)
CREAT SERPL-MCNC: 1.5 MG/DL (ref 0.7–1.2)
EOSINOPHILS ABSOLUTE: 0.45 E9/L (ref 0.05–0.5)
EOSINOPHILS RELATIVE PERCENT: 5.3 % (ref 0–6)
GFR SERPL CREATININE-BSD FRML MDRD: 44 ML/MIN/1.73
GLUCOSE BLD-MCNC: 148 MG/DL (ref 74–99)
HCT VFR BLD CALC: 37.9 % (ref 37–54)
HEMOGLOBIN: 11.9 G/DL (ref 12.5–16.5)
IMMATURE GRANULOCYTES #: 0.04 E9/L
IMMATURE GRANULOCYTES %: 0.5 % (ref 0–5)
LYMPHOCYTES ABSOLUTE: 1.93 E9/L (ref 1.5–4)
LYMPHOCYTES RELATIVE PERCENT: 22.6 % (ref 20–42)
MCH RBC QN AUTO: 29.3 PG (ref 26–35)
MCHC RBC AUTO-ENTMCNC: 31.4 % (ref 32–34.5)
MCV RBC AUTO: 93.3 FL (ref 80–99.9)
METER GLUCOSE: 134 MG/DL (ref 74–99)
METER GLUCOSE: 182 MG/DL (ref 74–99)
METER GLUCOSE: 185 MG/DL (ref 74–99)
METER GLUCOSE: 216 MG/DL (ref 74–99)
MONOCYTES ABSOLUTE: 0.78 E9/L (ref 0.1–0.95)
MONOCYTES RELATIVE PERCENT: 9.1 % (ref 2–12)
NEUTROPHILS ABSOLUTE: 5.28 E9/L (ref 1.8–7.3)
NEUTROPHILS RELATIVE PERCENT: 61.8 % (ref 43–80)
PDW BLD-RTO: 16.1 FL (ref 11.5–15)
PLATELET # BLD: 207 E9/L (ref 130–450)
PMV BLD AUTO: 9.9 FL (ref 7–12)
POTASSIUM REFLEX MAGNESIUM: 4.3 MMOL/L (ref 3.5–5)
RBC # BLD: 4.06 E12/L (ref 3.8–5.8)
SODIUM BLD-SCNC: 134 MMOL/L (ref 132–146)
WBC # BLD: 8.5 E9/L (ref 4.5–11.5)

## 2022-11-13 PROCEDURE — 82962 GLUCOSE BLOOD TEST: CPT

## 2022-11-13 PROCEDURE — 2060000000 HC ICU INTERMEDIATE R&B

## 2022-11-13 PROCEDURE — 2580000003 HC RX 258: Performed by: NURSE PRACTITIONER

## 2022-11-13 PROCEDURE — 94640 AIRWAY INHALATION TREATMENT: CPT

## 2022-11-13 PROCEDURE — 36415 COLL VENOUS BLD VENIPUNCTURE: CPT

## 2022-11-13 PROCEDURE — 6370000000 HC RX 637 (ALT 250 FOR IP): Performed by: NURSE PRACTITIONER

## 2022-11-13 PROCEDURE — 85025 COMPLETE CBC W/AUTO DIFF WBC: CPT

## 2022-11-13 PROCEDURE — 80048 BASIC METABOLIC PNL TOTAL CA: CPT

## 2022-11-13 PROCEDURE — 2700000000 HC OXYGEN THERAPY PER DAY

## 2022-11-13 PROCEDURE — 2500000003 HC RX 250 WO HCPCS: Performed by: INTERNAL MEDICINE

## 2022-11-13 RX ADMIN — APIXABAN 2.5 MG: 2.5 TABLET, FILM COATED ORAL at 20:17

## 2022-11-13 RX ADMIN — ATORVASTATIN CALCIUM 40 MG: 40 TABLET, FILM COATED ORAL at 20:17

## 2022-11-13 RX ADMIN — TAMSULOSIN HYDROCHLORIDE 0.4 MG: 0.4 CAPSULE ORAL at 08:12

## 2022-11-13 RX ADMIN — SODIUM CHLORIDE, PRESERVATIVE FREE 10 ML: 5 INJECTION INTRAVENOUS at 20:19

## 2022-11-13 RX ADMIN — BUMETANIDE 0.5 MG: 0.25 INJECTION INTRAMUSCULAR; INTRAVENOUS at 20:17

## 2022-11-13 RX ADMIN — IPRATROPIUM BROMIDE AND ALBUTEROL SULFATE 1 AMPULE: 2.5; .5 SOLUTION RESPIRATORY (INHALATION) at 12:54

## 2022-11-13 RX ADMIN — SODIUM CHLORIDE, PRESERVATIVE FREE 10 ML: 5 INJECTION INTRAVENOUS at 08:23

## 2022-11-13 RX ADMIN — IPRATROPIUM BROMIDE AND ALBUTEROL SULFATE 1 AMPULE: 2.5; .5 SOLUTION RESPIRATORY (INHALATION) at 21:05

## 2022-11-13 RX ADMIN — IPRATROPIUM BROMIDE AND ALBUTEROL SULFATE 1 AMPULE: 2.5; .5 SOLUTION RESPIRATORY (INHALATION) at 16:29

## 2022-11-13 RX ADMIN — BUMETANIDE 0.5 MG: 0.25 INJECTION INTRAMUSCULAR; INTRAVENOUS at 08:12

## 2022-11-13 RX ADMIN — APIXABAN 2.5 MG: 2.5 TABLET, FILM COATED ORAL at 08:12

## 2022-11-13 RX ADMIN — ASPIRIN 81 MG: 81 TABLET, COATED ORAL at 08:11

## 2022-11-13 RX ADMIN — FERROUS SULFATE TAB 325 MG (65 MG ELEMENTAL FE) 325 MG: 325 (65 FE) TAB at 08:12

## 2022-11-13 ASSESSMENT — PAIN SCALES - GENERAL
PAINLEVEL_OUTOF10: 0
PAINLEVEL_OUTOF10: 0

## 2022-11-13 NOTE — PROGRESS NOTES
Voice message left for Dr. Micki Paul re: patients increased tiredness/weakness and family concern. Awaiting call back/ new orders.

## 2022-11-13 NOTE — PROGRESS NOTES
[facility]                                Progress note    Subjective: The patient is awake and alert. Appears weak today   No acute complaitns and indicates he is moving in the right direction     Objective:    BP 98/60   Pulse 83   Temp 97.5 °F (36.4 °C) (Oral)   Resp 21   Ht 6' 1\" (1.854 m)   Wt 211 lb 8 oz (95.9 kg)   SpO2 95%   BMI 27.90 kg/m²     In: 600 [P.O.:600]  Out: 1450   In: 600   Out: 1450 [Urine:1450]    General appearance: NAD, conversant  HEENT: AT/NC, MMM  Neck: FROM, supple  Lungs: Clear to auscultation  CV: RRR, no MRGs  Vasc: Radial pulses 2+  Abdomen: Soft, non-tender; no masses or HSM  Extremities: No peripheral edema or digital cyanosis  Skin: no rash, lesions or ulcers  Psych: Alert and oriented to person, place and time  Neuro: Alert and interactive     Recent Labs     11/11/22 0210 11/12/22 0320 11/13/22  0437   WBC 7.2 11.1 8.5   HGB 11.8* 11.8* 11.9*   HCT 36.7* 36.4* 37.9    214 207       Recent Labs     11/11/22 0210 11/12/22 0320 11/13/22  0437    136 134   K 4.8 4.5 4.3   CL 95* 98 94*   CO2 23 26 29   BUN 20 30* 34*   CREATININE 1.3* 1.3* 1.5*   CALCIUM 9.3 9.5 9.2       Assessment:    Principal Problem:    CAP (community acquired pneumonia)  Active Problems:    Diabetes ([facility] [address]  Resolved Problems:    * No resolved hospital problems. *      Plan:  58-year-old male with a history of hyperlipidemia and hypertension, atrial fibrillation on oral anticoagulation presents to the ED with shortness of breath and is admitted to telemetry unit with     Acute respiratory failure  -No evidence of pneumonia given normal white count, normal pro calcitonin  -Continue Rocephin and doxyclin but can likely be discontinued tomorrow Doxy  -Supplement O2 demands keeping oxygen saturation greater than 92%.   Currently utilizing 2 L O2  -Likely from volume overload-continue diuresis  -DuoNebs   -Swallow study questionable aspiration pneumonia-doubt  -Strep/Legionella  Respiratory panel  Repeat chest x-ray in a.m. to assess for volume overload  Check BNP in a.m., 2500 on admission, echocardiogram June 2022 EF 55%  Check echocardiogram if not recently done     Decreased blood pressure  BP meds with parameters  Caution with diuresis     Diabetes Mellitus-uncontrolled  -Monitor labs  -ISS glucose control  -Hypoglycemia protocol initiated  Increase sliding scale to high-dose for better glucose control. Hemoglobin A1c 8.7-likely medication noncompliance  For discharge      11/12/22:  -SLP eval with modified liquids to nectar with regular consistency diet.   -D/c abx as procal is negative   -Continue to diurese with 0.5 mg bumex BID, down 1.3 L  -Now Currently on room air, 95%   -Continue to monitor through tonight, if significant improvement by tomorrow and normal ambulating pulse ox, he can be discharged home    11/13/22  Decrease  diuresis to daily , duoneb  Still hypoxemic on ambulation   CXR 11/12 better   Renal ins uff  friom duretic       Code status: Full  Consultants: None   DVT Prophylaxis Eliquis   PT/OT  Discharge planning     Tawanda Vega MD

## 2022-11-14 ENCOUNTER — APPOINTMENT (OUTPATIENT)
Dept: MRI IMAGING | Age: 87
DRG: 640 | End: 2022-11-14
Payer: MEDICARE

## 2022-11-14 LAB
ANION GAP SERPL CALCULATED.3IONS-SCNC: 11 MMOL/L (ref 7–16)
BASOPHILS ABSOLUTE: 0.05 E9/L (ref 0–0.2)
BASOPHILS RELATIVE PERCENT: 0.4 % (ref 0–2)
BUN BLDV-MCNC: 37 MG/DL (ref 6–23)
CALCIUM SERPL-MCNC: 9.5 MG/DL (ref 8.6–10.2)
CHLORIDE BLD-SCNC: 94 MMOL/L (ref 98–107)
CO2: 33 MMOL/L (ref 22–29)
CREAT SERPL-MCNC: 1.6 MG/DL (ref 0.7–1.2)
EOSINOPHILS ABSOLUTE: 0.39 E9/L (ref 0.05–0.5)
EOSINOPHILS RELATIVE PERCENT: 3.3 % (ref 0–6)
GFR SERPL CREATININE-BSD FRML MDRD: 41 ML/MIN/1.73
GLUCOSE BLD-MCNC: 195 MG/DL (ref 74–99)
HCT VFR BLD CALC: 44.6 % (ref 37–54)
HEMOGLOBIN: 13.8 G/DL (ref 12.5–16.5)
IMMATURE GRANULOCYTES #: 0.06 E9/L
IMMATURE GRANULOCYTES %: 0.5 % (ref 0–5)
LYMPHOCYTES ABSOLUTE: 1.77 E9/L (ref 1.5–4)
LYMPHOCYTES RELATIVE PERCENT: 14.9 % (ref 20–42)
MCH RBC QN AUTO: 29.9 PG (ref 26–35)
MCHC RBC AUTO-ENTMCNC: 30.9 % (ref 32–34.5)
MCV RBC AUTO: 96.5 FL (ref 80–99.9)
METER GLUCOSE: 173 MG/DL (ref 74–99)
METER GLUCOSE: 196 MG/DL (ref 74–99)
METER GLUCOSE: 200 MG/DL (ref 74–99)
METER GLUCOSE: 265 MG/DL (ref 74–99)
MONOCYTES ABSOLUTE: 1.3 E9/L (ref 0.1–0.95)
MONOCYTES RELATIVE PERCENT: 11 % (ref 2–12)
NEUTROPHILS ABSOLUTE: 8.28 E9/L (ref 1.8–7.3)
NEUTROPHILS RELATIVE PERCENT: 69.9 % (ref 43–80)
PDW BLD-RTO: 16 FL (ref 11.5–15)
PLATELET # BLD: 243 E9/L (ref 130–450)
PMV BLD AUTO: 9.6 FL (ref 7–12)
POTASSIUM SERPL-SCNC: 4.4 MMOL/L (ref 3.5–5)
RBC # BLD: 4.62 E12/L (ref 3.8–5.8)
SODIUM BLD-SCNC: 138 MMOL/L (ref 132–146)
WBC # BLD: 11.9 E9/L (ref 4.5–11.5)

## 2022-11-14 PROCEDURE — 6370000000 HC RX 637 (ALT 250 FOR IP): Performed by: INTERNAL MEDICINE

## 2022-11-14 PROCEDURE — 2700000000 HC OXYGEN THERAPY PER DAY

## 2022-11-14 PROCEDURE — 85025 COMPLETE CBC W/AUTO DIFF WBC: CPT

## 2022-11-14 PROCEDURE — 2500000003 HC RX 250 WO HCPCS: Performed by: INTERNAL MEDICINE

## 2022-11-14 PROCEDURE — 6370000000 HC RX 637 (ALT 250 FOR IP): Performed by: FAMILY MEDICINE

## 2022-11-14 PROCEDURE — 82962 GLUCOSE BLOOD TEST: CPT

## 2022-11-14 PROCEDURE — 94640 AIRWAY INHALATION TREATMENT: CPT

## 2022-11-14 PROCEDURE — 92526 ORAL FUNCTION THERAPY: CPT | Performed by: SPEECH-LANGUAGE PATHOLOGIST

## 2022-11-14 PROCEDURE — 6370000000 HC RX 637 (ALT 250 FOR IP): Performed by: NURSE PRACTITIONER

## 2022-11-14 PROCEDURE — 80048 BASIC METABOLIC PNL TOTAL CA: CPT

## 2022-11-14 PROCEDURE — 2060000000 HC ICU INTERMEDIATE R&B

## 2022-11-14 PROCEDURE — 36415 COLL VENOUS BLD VENIPUNCTURE: CPT

## 2022-11-14 PROCEDURE — 70551 MRI BRAIN STEM W/O DYE: CPT

## 2022-11-14 PROCEDURE — 2580000003 HC RX 258: Performed by: NURSE PRACTITIONER

## 2022-11-14 RX ADMIN — METOPROLOL SUCCINATE 50 MG: 50 TABLET, EXTENDED RELEASE ORAL at 10:40

## 2022-11-14 RX ADMIN — APIXABAN 2.5 MG: 2.5 TABLET, FILM COATED ORAL at 20:12

## 2022-11-14 RX ADMIN — BUMETANIDE 0.5 MG: 0.25 INJECTION INTRAMUSCULAR; INTRAVENOUS at 10:40

## 2022-11-14 RX ADMIN — IPRATROPIUM BROMIDE AND ALBUTEROL SULFATE 1 AMPULE: 2.5; .5 SOLUTION RESPIRATORY (INHALATION) at 20:34

## 2022-11-14 RX ADMIN — APIXABAN 2.5 MG: 2.5 TABLET, FILM COATED ORAL at 10:39

## 2022-11-14 RX ADMIN — FERROUS SULFATE TAB 325 MG (65 MG ELEMENTAL FE) 325 MG: 325 (65 FE) TAB at 10:40

## 2022-11-14 RX ADMIN — TAMSULOSIN HYDROCHLORIDE 0.4 MG: 0.4 CAPSULE ORAL at 10:39

## 2022-11-14 RX ADMIN — IPRATROPIUM BROMIDE AND ALBUTEROL SULFATE 1 AMPULE: 2.5; .5 SOLUTION RESPIRATORY (INHALATION) at 14:07

## 2022-11-14 RX ADMIN — IPRATROPIUM BROMIDE AND ALBUTEROL SULFATE 1 AMPULE: 2.5; .5 SOLUTION RESPIRATORY (INHALATION) at 17:18

## 2022-11-14 RX ADMIN — SODIUM CHLORIDE, PRESERVATIVE FREE 10 ML: 5 INJECTION INTRAVENOUS at 20:12

## 2022-11-14 RX ADMIN — ATORVASTATIN CALCIUM 40 MG: 40 TABLET, FILM COATED ORAL at 20:13

## 2022-11-14 RX ADMIN — ASPIRIN 81 MG: 81 TABLET, COATED ORAL at 10:40

## 2022-11-14 RX ADMIN — SODIUM CHLORIDE, PRESERVATIVE FREE 10 ML: 5 INJECTION INTRAVENOUS at 10:42

## 2022-11-14 RX ADMIN — BUMETANIDE 0.5 MG: 0.25 INJECTION INTRAMUSCULAR; INTRAVENOUS at 20:12

## 2022-11-14 RX ADMIN — ACETAMINOPHEN 650 MG: 325 TABLET ORAL at 03:52

## 2022-11-14 RX ADMIN — INSULIN LISPRO 8 UNITS: 100 INJECTION, SOLUTION INTRAVENOUS; SUBCUTANEOUS at 17:05

## 2022-11-14 ASSESSMENT — PAIN SCALES - GENERAL
PAINLEVEL_OUTOF10: 0
PAINLEVEL_OUTOF10: 0
PAINLEVEL_OUTOF10: 6

## 2022-11-14 NOTE — PROGRESS NOTES
[facility]                                Progress note    Subjective: The patient is awake and alert. Slept well   More alert today     Objective:    BP (!) 94/51   Pulse (!) 102   Temp 98.4 °F (36.9 °C) (Oral)   Resp 18   Ht 6' 1\" (1.854 m)   Wt 207 lb (93.9 kg)   SpO2 95%   BMI 27.31 kg/m²     In: 10 [I.V.:10]  Out: 850   In: 10   Out: 850 [Urine:850]    General appearance: NAD, conversant  HEENT: AT/NC, MMM  Neck: FROM, supple  Lungs: Clear to auscultation  CV: RRR, no MRGs  Vasc: Radial pulses 2+  Abdomen: Soft, non-tender; no masses or HSM  Extremities: No peripheral edema or digital cyanosis  Skin: no rash, lesions or ulcers  Psych: Alert and oriented to person, place and time  Neuro: Alert and interactive     Recent Labs     11/12/22 0320 11/13/22  0437   WBC 11.1 8.5   HGB 11.8* 11.9*   HCT 36.4* 37.9    207       Recent Labs     11/12/22 0320 11/13/22  0437    134   K 4.5 4.3   CL 98 94*   CO2 26 29   BUN 30* 34*   CREATININE 1.3* 1.5*   CALCIUM 9.5 9.2       Assessment:    Principal Problem:    CAP (community acquired pneumonia)  Active Problems:    Diabetes ([facility] [address]  Resolved Problems:    * No resolved hospital problems. *      Plan:  28-year-old male with a history of hyperlipidemia and hypertension, atrial fibrillation on oral anticoagulation presents to the ED with shortness of breath and is admitted to telemetry unit with     Acute respiratory failure  -No evidence of pneumonia given normal white count, normal pro calcitonin  -Continue Rocephin and doxyclin but can likely be discontinued tomorrow Doxy  -Supplement O2 demands keeping oxygen saturation greater than 92%.   Currently utilizing 2 L O2  -Likely from volume overload-continue diuresis  -DuoNebs   -Swallow study questionable aspiration pneumonia-doubt  -Strep/Legionella  Respiratory panel  Repeat chest x-ray in a.m. to assess for volume overload  Check BNP in a.m., 2500 on admission, echocardiogram June 2022 EF 55%  Check echocardiogram if not recently done     Decreased blood pressure  BP meds with parameters  Caution with diuresis     Diabetes Mellitus-uncontrolled  -Monitor labs  -ISS glucose control  -Hypoglycemia protocol initiated  Increase sliding scale to high-dose for better glucose control. Hemoglobin A1c 8.7-likely medication noncompliance  For discharge      11/12/22:  -SLP eval with modified liquids to nectar with regular consistency diet.   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

## 2022-11-14 NOTE — PROGRESS NOTES
SPEECH LANGUAGE PATHOLOGY  DAILY PROGRESS NOTE        PATIENT NAME:  Edwin Green      :  1934          TODAY'S DATE:  2022 ROOM:  [address] [place] [address]        SWALLOWING    Pt seen at bedside for a follow up dysphagia management. RN and pt report toleration of current diet. Oral trials completed with good clinical toleration. DIAGNOSIS:   Clinical indicators of pharyngeal phase dysphagia including frequent overt s/s of aspiration with thin liquid                          DIET RECOMMENDATIONS:  Regular consistency solids (IDDSI level 7) with nectar consistency (mildly thick - IDDSI level 2) liquids      MEDICATION ADMINISTRATION, Administer medication whole, ONE AT A TIME, with sips of thickened liquid OR Administer medication whole, ONE AT A TIME, in pudding/applesauce                FEEDING RECOMMENDATIONS:                         Assistance level:  No assistance needed                          Compensatory strategies recommended: Small bites/sips      Education- Pt educated on results and POC. Pt trained on compensatory strategies for safe swallow with good outcome. Questions answered. Will continue SP intervention as per previously established POC. Comfort RENEE CCC/SLP [id number]  Speech Pathologist              CPT code(s) 78676  dysphagia tx  Total minutes :  15 minutes

## 2022-11-14 NOTE — CARE COORDINATION
Social Work / Discharge Planning : Therapy am/pac 18/24 supports Inn at [facility].. Patient currently on 02 NEW. Goal to wean. AWait needs at discharge. SW to follow.  Electronically signed by MARISELA Álvarez on 11/14/22 at 10:30 AM EST

## 2022-11-15 ENCOUNTER — APPOINTMENT (OUTPATIENT)
Dept: GENERAL RADIOLOGY | Age: 87
DRG: 640 | End: 2022-11-15
Payer: MEDICARE

## 2022-11-15 LAB
ADENOVIRUS BY PCR: NOT DETECTED
ANION GAP SERPL CALCULATED.3IONS-SCNC: 14 MMOL/L (ref 7–16)
BASOPHILS ABSOLUTE: 0.04 E9/L (ref 0–0.2)
BASOPHILS RELATIVE PERCENT: 0.3 % (ref 0–2)
BORDETELLA PARAPERTUSSIS BY PCR: NOT DETECTED
BORDETELLA PERTUSSIS BY PCR: NOT DETECTED
BUN BLDV-MCNC: 37 MG/DL (ref 6–23)
CALCIUM SERPL-MCNC: 9.4 MG/DL (ref 8.6–10.2)
CHLAMYDOPHILIA PNEUMONIAE BY PCR: NOT DETECTED
CHLORIDE BLD-SCNC: 93 MMOL/L (ref 98–107)
CO2: 29 MMOL/L (ref 22–29)
CORONAVIRUS 229E BY PCR: NOT DETECTED
CORONAVIRUS HKU1 BY PCR: NOT DETECTED
CORONAVIRUS NL63 BY PCR: NOT DETECTED
CORONAVIRUS OC43 BY PCR: NOT DETECTED
CREAT SERPL-MCNC: 1.7 MG/DL (ref 0.7–1.2)
EOSINOPHILS ABSOLUTE: 0.29 E9/L (ref 0.05–0.5)
EOSINOPHILS RELATIVE PERCENT: 2.4 % (ref 0–6)
GFR SERPL CREATININE-BSD FRML MDRD: 38 ML/MIN/1.73
GLUCOSE BLD-MCNC: 249 MG/DL (ref 74–99)
HCT VFR BLD CALC: 42 % (ref 37–54)
HEMOGLOBIN: 13.3 G/DL (ref 12.5–16.5)
HUMAN METAPNEUMOVIRUS BY PCR: NOT DETECTED
HUMAN RHINOVIRUS/ENTEROVIRUS BY PCR: NOT DETECTED
IMMATURE GRANULOCYTES #: 0.04 E9/L
IMMATURE GRANULOCYTES %: 0.3 % (ref 0–5)
INFLUENZA A BY PCR: NOT DETECTED
INFLUENZA B BY PCR: NOT DETECTED
LYMPHOCYTES ABSOLUTE: 2.19 E9/L (ref 1.5–4)
LYMPHOCYTES RELATIVE PERCENT: 18.2 % (ref 20–42)
MCH RBC QN AUTO: 29.2 PG (ref 26–35)
MCHC RBC AUTO-ENTMCNC: 31.7 % (ref 32–34.5)
MCV RBC AUTO: 92.1 FL (ref 80–99.9)
METER GLUCOSE: 107 MG/DL (ref 74–99)
METER GLUCOSE: 177 MG/DL (ref 74–99)
METER GLUCOSE: 198 MG/DL (ref 74–99)
METER GLUCOSE: 308 MG/DL (ref 74–99)
MONOCYTES ABSOLUTE: 1.26 E9/L (ref 0.1–0.95)
MONOCYTES RELATIVE PERCENT: 10.5 % (ref 2–12)
MYCOPLASMA PNEUMONIAE BY PCR: NOT DETECTED
NEUTROPHILS ABSOLUTE: 8.21 E9/L (ref 1.8–7.3)
NEUTROPHILS RELATIVE PERCENT: 68.3 % (ref 43–80)
PARAINFLUENZA VIRUS 1 BY PCR: NOT DETECTED
PARAINFLUENZA VIRUS 2 BY PCR: NOT DETECTED
PARAINFLUENZA VIRUS 3 BY PCR: NOT DETECTED
PARAINFLUENZA VIRUS 4 BY PCR: NOT DETECTED
PDW BLD-RTO: 15.9 FL (ref 11.5–15)
PLATELET # BLD: 226 E9/L (ref 130–450)
PMV BLD AUTO: 9.9 FL (ref 7–12)
POTASSIUM SERPL-SCNC: 4.2 MMOL/L (ref 3.5–5)
RBC # BLD: 4.56 E12/L (ref 3.8–5.8)
RESPIRATORY SYNCYTIAL VIRUS BY PCR: NOT DETECTED
SARS-COV-2, PCR: NOT DETECTED
SODIUM BLD-SCNC: 136 MMOL/L (ref 132–146)
WBC # BLD: 12 E9/L (ref 4.5–11.5)

## 2022-11-15 PROCEDURE — 2060000000 HC ICU INTERMEDIATE R&B

## 2022-11-15 PROCEDURE — 2500000003 HC RX 250 WO HCPCS: Performed by: INTERNAL MEDICINE

## 2022-11-15 PROCEDURE — 80048 BASIC METABOLIC PNL TOTAL CA: CPT

## 2022-11-15 PROCEDURE — 6370000000 HC RX 637 (ALT 250 FOR IP): Performed by: NURSE PRACTITIONER

## 2022-11-15 PROCEDURE — 6370000000 HC RX 637 (ALT 250 FOR IP): Performed by: INTERNAL MEDICINE

## 2022-11-15 PROCEDURE — 94640 AIRWAY INHALATION TREATMENT: CPT

## 2022-11-15 PROCEDURE — 82962 GLUCOSE BLOOD TEST: CPT

## 2022-11-15 PROCEDURE — 97535 SELF CARE MNGMENT TRAINING: CPT

## 2022-11-15 PROCEDURE — 2580000003 HC RX 258: Performed by: NURSE PRACTITIONER

## 2022-11-15 PROCEDURE — 0202U NFCT DS 22 TRGT SARS-COV-2: CPT

## 2022-11-15 PROCEDURE — 97530 THERAPEUTIC ACTIVITIES: CPT

## 2022-11-15 PROCEDURE — 6370000000 HC RX 637 (ALT 250 FOR IP): Performed by: FAMILY MEDICINE

## 2022-11-15 PROCEDURE — 2700000000 HC OXYGEN THERAPY PER DAY

## 2022-11-15 PROCEDURE — 36415 COLL VENOUS BLD VENIPUNCTURE: CPT

## 2022-11-15 PROCEDURE — 71045 X-RAY EXAM CHEST 1 VIEW: CPT

## 2022-11-15 PROCEDURE — 85025 COMPLETE CBC W/AUTO DIFF WBC: CPT

## 2022-11-15 RX ORDER — METOPROLOL SUCCINATE 50 MG/1
50 TABLET, EXTENDED RELEASE ORAL DAILY
Qty: 30 TABLET | Refills: 4 | DISCHARGE
Start: 2022-11-15

## 2022-11-15 RX ORDER — CEFDINIR 300 MG/1
300 CAPSULE ORAL EVERY 12 HOURS SCHEDULED
Qty: 13 CAPSULE | Refills: 0 | DISCHARGE
Start: 2022-11-15 | End: 2022-11-22

## 2022-11-15 RX ORDER — CEFDINIR 300 MG/1
300 CAPSULE ORAL EVERY 12 HOURS SCHEDULED
Status: DISCONTINUED | OUTPATIENT
Start: 2022-11-15 | End: 2022-11-16 | Stop reason: HOSPADM

## 2022-11-15 RX ADMIN — FERROUS SULFATE TAB 325 MG (65 MG ELEMENTAL FE) 325 MG: 325 (65 FE) TAB at 09:35

## 2022-11-15 RX ADMIN — BUMETANIDE 0.5 MG: 0.25 INJECTION INTRAMUSCULAR; INTRAVENOUS at 09:32

## 2022-11-15 RX ADMIN — CEFDINIR 300 MG: 300 CAPSULE ORAL at 21:32

## 2022-11-15 RX ADMIN — ATORVASTATIN CALCIUM 40 MG: 40 TABLET, FILM COATED ORAL at 21:32

## 2022-11-15 RX ADMIN — SODIUM CHLORIDE, PRESERVATIVE FREE 10 ML: 5 INJECTION INTRAVENOUS at 21:33

## 2022-11-15 RX ADMIN — APIXABAN 2.5 MG: 2.5 TABLET, FILM COATED ORAL at 09:35

## 2022-11-15 RX ADMIN — IPRATROPIUM BROMIDE AND ALBUTEROL SULFATE 1 AMPULE: 2.5; .5 SOLUTION RESPIRATORY (INHALATION) at 20:35

## 2022-11-15 RX ADMIN — APIXABAN 2.5 MG: 2.5 TABLET, FILM COATED ORAL at 21:32

## 2022-11-15 RX ADMIN — INSULIN LISPRO 12 UNITS: 100 INJECTION, SOLUTION INTRAVENOUS; SUBCUTANEOUS at 12:06

## 2022-11-15 RX ADMIN — METOPROLOL SUCCINATE 50 MG: 50 TABLET, EXTENDED RELEASE ORAL at 09:35

## 2022-11-15 RX ADMIN — IPRATROPIUM BROMIDE AND ALBUTEROL SULFATE 1 AMPULE: 2.5; .5 SOLUTION RESPIRATORY (INHALATION) at 09:49

## 2022-11-15 RX ADMIN — TAMSULOSIN HYDROCHLORIDE 0.4 MG: 0.4 CAPSULE ORAL at 09:35

## 2022-11-15 RX ADMIN — CEFDINIR 300 MG: 300 CAPSULE ORAL at 12:06

## 2022-11-15 RX ADMIN — ACETAMINOPHEN 650 MG: 325 TABLET ORAL at 16:16

## 2022-11-15 RX ADMIN — SODIUM CHLORIDE, PRESERVATIVE FREE 10 ML: 5 INJECTION INTRAVENOUS at 09:28

## 2022-11-15 RX ADMIN — ASPIRIN 81 MG: 81 TABLET, COATED ORAL at 09:35

## 2022-11-15 ASSESSMENT — PAIN DESCRIPTION - ONSET
ONSET: SUDDEN
ONSET: SUDDEN

## 2022-11-15 ASSESSMENT — PAIN DESCRIPTION - FREQUENCY
FREQUENCY: INTERMITTENT
FREQUENCY: INTERMITTENT

## 2022-11-15 ASSESSMENT — PAIN DESCRIPTION - PAIN TYPE
TYPE: ACUTE PAIN
TYPE: ACUTE PAIN

## 2022-11-15 ASSESSMENT — PAIN DESCRIPTION - LOCATION
LOCATION: KNEE

## 2022-11-15 ASSESSMENT — PAIN DESCRIPTION - DESCRIPTORS
DESCRIPTORS: OTHER (COMMENT)
DESCRIPTORS: THROBBING
DESCRIPTORS: OTHER (COMMENT)

## 2022-11-15 ASSESSMENT — PAIN - FUNCTIONAL ASSESSMENT: PAIN_FUNCTIONAL_ASSESSMENT: PREVENTS OR INTERFERES WITH MANY ACTIVE NOT PASSIVE ACTIVITIES

## 2022-11-15 ASSESSMENT — PAIN SCALES - GENERAL
PAINLEVEL_OUTOF10: 0
PAINLEVEL_OUTOF10: 0
PAINLEVEL_OUTOF10: 6
PAINLEVEL_OUTOF10: 0
PAINLEVEL_OUTOF10: 3
PAINLEVEL_OUTOF10: 6

## 2022-11-15 ASSESSMENT — PAIN DESCRIPTION - ORIENTATION
ORIENTATION: LEFT

## 2022-11-15 NOTE — PROGRESS NOTES
Occupational Therapy  OT BEDSIDE TREATMENT NOTE      Date:11/15/2022  Patient Name: Lucas Metzger  MRN: [id number]  : 1934  Room: [address] [place] [address]     Evaluating OT: Nelly Luna OTR/L   [id number]       Referring GALLITO Scott CNP    Specific Provider Orders/Date:OT eval and treat 11/10/2022       Diagnosis:  SOB (shortness of breath) [R06.02]  Acute respiratory failure with hypoxia (HCC) [J96.01]     Pertinent Medical History: brainstem stroke, A fib,      Precautions:  Fall Risk, O2, droplet +       Assessment of current deficits    [x] Functional mobility            [x]ADLs           [x] Strength                  []Cognition    [x] Functional transfers          [x] IADLs         [x] Safety Awareness   [x]Endurance    [] Fine Coordination                         [x] Balance      [] Vision/perception   []Sensation      []Gross Motor Coordination             [] ROM           [] Delirium                   [] Motor Control      OT PLAN OF CARE   OT POC based on physician orders, patient diagnosis and results of clinical assessment     Frequency/Duration  2-4 days/wk for 2 weeks PRN   Specific OT Treatment Interventions to include:   ADL retraining/adapted techniques and AE recommendations to increase functional independence within precautions                    Energy conservation techniques to improve tolerance for selfcare routine   Functional transfer/mobility training/DME recommendations for increased independence, safety and fall prevention         Patient/family education to increase safety and functional independence             Environmental modifications for safe mobility and completion of ADLs                             Therapeutic activity to improve functional performance during ADLs.                                          Therapeutic exercise to improve tolerance and functional strength for ADLs    Balance retraining/tolerance tasks for facilitation of postural control with dynamic challenges during ADLs . Positioning to improve functional independence  []      Recommended Adaptive Equipment: TBD      Home Living: Pt lives at [facility] with his wife (she is currently in hospital). Independent with ADLs, ambulates using rollator          Pain Level: no pain   Cognition: Awake and alert. Functional Assessment:  AM-PAC Daily Activity Raw Score: 17/24    Initial Eval Status  Date: 11/11/22 Treatment Status  Date:11/15/22  STGs = LTGs  Time frame: 10-14 days   Feeding Independent        Grooming SBA/set-up  Setup seated   Independent    UB Dressing Min A    Independent    LB Dressing Min A      Patient has shoes with braces - preferred not to put them on- states he doesn't always where them  Pt requesting hospital pants. Declined to don shoes/braces. Mod I    Bathing SBA    Mod I    Toileting Patient reports he is incontinent   incontinent during mobility. Using male pure wick catheter. Mod I    Bed Mobility  Independent   Supine to sit  Supervision supine to sit       Functional Transfers SBA  Sit- stand from bed  SBA   Mod I    Functional Mobility SBA,w/walker   Ambulating in room  SBA using w/w in room setting. Mod I  with good tolerance    Balance Sitting:     Static:  Independent    Dynamic:SBA   Standing: SBA    Independent   Activity Tolerance No SOB  Fair -   Fatigue noted with limited activity. Good  with ADL activity      Comments:  pt agreeable to therapy this AM.  Asking for hospital pants. Found pants after leaving room and sent them into the room with nursing aide due to pt now droplet + for rule out. He completed activity with fatigue noted. Remained seated in the chair at the end of the session. Education/treatment:  ADL retraining with facilitation of movement to increase self care skills. Therapeutic activity to address balance and endurance for ADL and transfers. Pt education of walker safety, transfer safety, and energy conservation. Pt has made  progress towards set goals.        Time In: 11:15  Time Out: 11:38     Min Units   Therapeutic Ex 37070     Therapeutic Activities 14904 55 9   ADL/Self Care 43481 10 1   Orthotic Management 09512     Neuro Re-Ed 33030     Non-Billable Time     TOTAL TIMED TREATMENT [facility] DIMPLE/L [id number]

## 2022-11-15 NOTE — PROGRESS NOTES
SPEECH LANGUAGE PATHOLOGY  DAILY PROGRESS NOTE        PATIENT NAME:  Zurdo Jenkins      :  1934          TODAY'S DATE:  11/15/2022 ROOM:  [address] [place] [address]    Attempted ongoing Speech-Language Pathology intervention for dysphagia management. Pt unavailable at this time due to:  [] HOLD per RN/ medical staff d/t medical status   [] Off unit for testing/ procedure    [x] With medical staff   [] Declined intervention  [] Sleeping/ Lethargic   [] Other:     SLP to continue previously established POC and re-attempt as able.

## 2022-11-15 NOTE — PROGRESS NOTES
Patient reports 6/10 pain in left knee during movement and when weight-bearing. Oxygen removed for ~10 min while patient seated. SpO2 93% at rest throughout. Patient attempted to stand for ambulation trial on RA, but fell back to seated position in chair after pushing to standing position from armrests and transitioning hands to walker. He stated \"I lost my balance\" and \"the pain was worse when standing [vs sitting]\". Patient's daughter in room at the time, discussing possibilities for transport back to assisted living on discharge with [address] informed.

## 2022-11-15 NOTE — CARE COORDINATION
Discharge order noted. CM made in room visit to confirm mode of transportation for pt to return to [facility]. Dtr at bedside and confirms she can transport pt, however she has concerns with his BP, Lance Calvert RN aware and in the room. Will follow along with  and assist with discharge planning as necessary.    Amirah Joya, BARBIEN, RN  [facility] Case Management  [phone]

## 2022-11-15 NOTE — PROGRESS NOTES
Physical Therapy  Facility/Department: [address] INTERMEDIATE  Treatment Note    Name: Jean-Claude Rivero  : 1934  MRN: [id number]  Date of Service: 11/15/2022      Patient Diagnosis(es): The encounter diagnosis was Acute respiratory failure with hypoxia ([facility] [address] Past Medical History:  has a past medical history of A-fib ([address] Acquired absence of kidney, Anemia, BPH (benign prostatic hyperplasia), Brainstem stroke ([address] Colon cancer ([address] Congenital absence of one kidney, DM (diabetes mellitus), type 2 ([address] Dysphagia, GERD (gastroesophageal reflux disease), Hyperlipidemia, Hypertension, Kidney stones, Overactive bladder, PE (pulmonary thromboembolism) ([address] SSS (sick sinus syndrome) ([facility] [address] and Vertebral artery occlusion, right. Past Surgical History:  has a past surgical history that includes Diagnostic Cardiac Cath Lab Procedure; joint replacement (Right, ); joint replacement (Left, ); Skin graft (Bilateral, ); Cholecystectomy (); Tonsillectomy; eye surgery; Colon surgery (Right, 2014); Coronary angioplasty (); Cystoscopy (2017); Cardiac catheterization (2017); and Coronary artery bypass graft. Evaluating Therapist: Terri Jason PT    Room #:  1074/4015-J  Diagnosis:  SOB (shortness of breath) [R06.02]  Acute respiratory failure with hypoxia (HCC) [J96.01]  PMHx/PSHx:  A-fib, Brainstem stroke, HTN, dysphagia  Precautions:  Fall risk      Social:  Pt admitted from [facility]. Ambulates with rollator. Pt has B AFO's but states does not always need to wear them. Initial Evaluation  Date: 22 Treatment     Date: 11/15/22 Short Term/ Long Term   Goals   Was pt agreeable to Eval/treatment? yes Yes    Does pt have pain?  No c/o pain No c/o pain    Bed Mobility  Rolling: independent  Supine to sit: independent  Sit to supine: NT  Scooting: independent NT independent   Transfers Sit to stand: SBA  Stand to sit: SBA  Stand pivot: SBA Sit to stand: Nicho  Stand to sit: Nicho  Stand pivot: MaxA with [employer] independent   Ambulation    40 feet with ww with CG/SBA 25 feet with WW with MaxA 150 feet with ww independenet   Stair Negotiation  Ascended and descended  NT NT  N/A   LE strength     B ankles 3/5 all else 3+/5 Grossly 3+/5 with exception of ankles that are grossly 3/5   4/5   balance      Fair+ with ww Min<>MaxA with [employer]    AM-PAC Raw score               18/24 14/24      Pt is A & O x 4  Sensation:  Denied new numbness/tingling  Edema:  None noted    Therapeutic Exercises:    Sit<>stand x3 with SBA<>Nicho  Ambulation: 2x25 feet with CGA that became MaxA  Stand pivot transfer x2 with [employer] with MaxA  Standing balance activities: performed static and dynamic standing balance activities within AD with single and BUE support with reaches within TOMMY with Min/ModA    Patient education  Pt educated on transfer technique, safety with turning, benefits of movement. Patient response to education:   Pt verbalized understanding Pt demonstrated skill Pt requires further education in this area   Yes Yes Reinforce     ASSESSMENT:    Comments: The pt was able to stand from the bedside chair easily initially, ambulated to the hallway and became extremely fatigued, began to ambulate with short B step length and antalgic step with less time spent in L stance phase. The pt was assisted with ambulation back to the chair with MaxA, pt asked \"hold onto me\" multiple times, he could feel he was falling to the L but was unable to compensate. The pt was able to repeat transfers with little to no assistance and performed standing balance activities without significant difficulty, remained unsteady. It would be extremely unsafe for the pt to return to an snf where he did not have hands-on assistance, he would have almost certainly fallen ambulating today had I not been there to prevent the fall.      Treatment:  Patient practiced and was instructed in the following treatment:    Transfer training: verbal cues for hand placement during sit to stand transfer and assistance for anterior weight shift in order to facilitate initiation of the stand. Gait training: verbal cues for sequencing and safety, verbal cues for appropriate step length and positioning within walker with dynamic activities, physical assist for walker management, and steadying   Standing balance activities: performed static and dynamic standing balance activities within AD with single and BUE support in order to progress safety with standing activities as well as improve independence with standing ADLs. PLAN:    Patient is making good progress towards established goals. Will continue with current POC. Time in  1625  Time out  1655    Total Treatment Time  30 minutes     CPT codes:  [] Gait training 90412 0 minutes  [] Manual therapy 92494 0 minutes  [x] Therapeutic activities 03879 30 minutes  [] Therapeutic exercises 01647 0 minutes  [] Neuromuscular reeducation 45481 0 minutes    Vidal Oscar.  Jovanna Dee, [address]  number:  PT [id number]

## 2022-11-15 NOTE — PROGRESS NOTES
Physical Therapy    Pt NA for Rx this AM, presently in the bathroom. Will follow at another time.   Salima Aden PTA [id number]

## 2022-11-15 NOTE — DISCHARGE SUMMARY
[facility]   Discharge summary   Patient ID:  Quita Saini  [id number]  11 y.o.  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)    Stroke-like symptoms    History of CVA (cerebrovascular accident)    Elevated serum creatinine    HLD (hyperlipidemia)    History of pulmonary embolism    Status post coronary artery bypass graft    Nonrheumatic aortic valve insufficiency    Sinus bradycardia    Primary hypertension    Chronic anticoagulation    Stage 3b chronic kidney disease (HCC)    SOB (shortness of breath)    CAP (community acquired pneumonia)       Discharge Diagnoses: CAP    Consults: none    Procedures: none    Hospital Course: T    80-year-old male with a history of hyperlipidemia and hypertension, atrial fibrillation on oral anticoagulation presents to the ED with shortness of breath and is admitted to telemetry unit with     Acute respiratory failure  -No evidence of pneumonia given normal white count, normal pro calcitonin  -Continue Rocephin and doxyclin but can likely be discontinued tomorrow Doxy  -Supplement O2 demands keeping oxygen saturation greater than 92%.   Currently utilizing 2 L O2  -Likely from volume overload-continue diuresis  -DuoNebs   -Swallow study questionable aspiration pneumonia-doubt  -Strep/Legionella  Respiratory panel  Repeat chest x-ray in a.m. to assess for volume overload  Check BNP in a.m., 2500 on admission, echocardiogram June 2022 EF 55%  Check echocardiogram if not recently done     Decreased blood pressure  BP meds with parameters  Caution with diuresis     Diabetes Mellitus-uncontrolled  -Monitor labs  -ISS glucose control  -Hypoglycemia protocol initiated  Increase sliding scale to high-dose for better glucose control. Hemoglobin A1c 8.7-likely medication noncompliance  For discharge        11/12/22:  -SLP eval with modified liquids to nectar with regular consistency diet. -D/c abx as procal is negative   -Continue to diurese with 0.5 mg bumex BID, down 1.3 L  -Now Currently on room air, 95%   -Continue to monitor through tonight, if significant improvement by tomorrow and normal ambulating pulse ox, he can be discharged home     11/13/22  Decrease  diuresis to daily , duoneb  Still hypoxemic on ambulation   CXR 11/12 better   Renal ins uff  friom duretic      11/14  Family concerned about stroke as pt has had same symptoms previosuly with brain stem stroke  Obtain mri brain   Labs today   If mri negative , he can likely be dc'd      11/15/22:  -MRI negative   -Worsening kidney function, 37/1.7, hold bumex   -Worsening leukocytosis, Start omnicef x 7days   -Follow-up with PCP within 1 week to recheck BMP to assess renal function and white count  -Awake alert and oriented up in chair on my evaluation today without any acute complaint    11/16/22  -Ambulating around the room without oxygen  -Follow-up with PCP in 1 week to assess renal function and WBC    Recent Labs     11/13/22  0437 11/14/22  1028 11/15/22  0930   WBC 8.5 11.9* 12.0*   HGB 11.9* 13.8 13.3   HCT 37.9 44.6 42.0    243 226       Recent Labs     11/13/22  0437 11/14/22  1028 11/15/22  0930    138 136   K 4.3 4.4 4.2   CL 94* 94* 93*   CO2 29 33* 29   BUN 34* 37* 37*   CREATININE 1.5* 1.6* 1.7*   CALCIUM 9.2 9.5 9.4       XR CHEST (2 VW)    Result Date: 11/10/2022  EXAMINATION: TWO XRAY VIEWS OF THE CHEST 11/10/2022 11:39 am COMPARISON: None. HISTORY: ORDERING SYSTEM PROVIDED HISTORY: SOB TECHNOLOGIST PROVIDED HISTORY: Reason for exam:->SOB FINDINGS: The heart is enlarged. Pulmonary vessels are prominent.   No airspace consolidation. No pneumothorax or pleural effusion. Suspect early pulmonary vascular congestion. CT CHEST WO CONTRAST    Result Date: 11/10/2022  EXAMINATION: CT OF THE CHEST WITHOUT CONTRAST 11/10/2022 1:15 pm TECHNIQUE: CT of the chest was performed without the administration of intravenous contrast. Multiplanar reformatted images are provided for review. Automated exposure control, iterative reconstruction, and/or weight based adjustment of the mA/kV was utilized to reduce the radiation dose to as low as reasonably achievable. COMPARISON: None. HISTORY: ORDERING SYSTEM PROVIDED HISTORY: sob, fever, ?pneumonia, ?covid-19 TECHNOLOGIST PROVIDED HISTORY: Reason for exam:->sob, fever, ?pneumonia, ?covid-19 FINDINGS: Mediastinum: Thyroid is homogeneous in appearance. No mediastinal mass or adenopathy. Vascular calcifications seen within the thoracic aorta. Extensive coronary artery calcification identified. Cardiac chambers are enlarged. No pericardial effusion. No bulky hilar or axillary lymphadenopathy. Lungs/pleura: Underlying chronic and emphysematous changes seen within the lung fields bilaterally. Patchy ground-glass opacity identified superimposed centrally within the mid and lower lung fields bilaterally concerning for superimposed infiltrate or edema. There is mild interseptal thickening identified. There is no definite pleural effusion. Some bronchial wall thickening seen in the perihilar regions bilaterally. Upper Abdomen: Visualized upper abdomen is unremarkable. The right kidney is been surgically removed. Soft Tissues/Bones: Degenerative changes seen within the spine with no acute chest wall abnormality. There is enlargement of cardiac chambers with some ground-glass opacity identified and interseptal thickening to suggest possible interstitial edema or pneumonia. Clinical correlation is needed. No definite pleural effusion.        Discharge Exam:    HEENT: NCAT,  PERRLA, No JVD  Heart:  RRR, no murmurs, gallops, or rubs.   Lungs:  CTA bilaterally, no wheeze, rales or rhonchi  Abd: bowel sounds present, nontender, nondistended, no masses  Extrem:  No clubbing, cyanosis, or edema    Disposition: home     Patient Condition at Discharge: Stable     Patient Instructions:      Medication List        START taking these medications      cefdinir 300 MG capsule  Commonly known as: OMNICEF  Take 1 capsule by mouth every 12 hours for 13 doses            CONTINUE taking these medications      acetaminophen 325 MG tablet  Commonly known as: TYLENOL  Take 2 tablets by mouth every 4 hours as needed for Pain     aluminum & magnesium hydroxide-simethicone 400-400-40 MG/5ML Susp  Commonly known as: MYLANTA     apixaban 2.5 MG Tabs tablet  Commonly known as: ELIQUIS  Take 1 tablet by mouth 2 times daily     artificial tears ointment     aspirin 81 MG EC tablet  Take 1 tablet by mouth daily     bisacodyl 5 MG EC tablet  Commonly known as: DULCOLAX     Claritin 10 MG tablet  Generic drug: loratadine     diphenhydrAMINE 25 MG capsule  Commonly known as: BENADRYL     ferrous sulfate 325 (65 Fe) MG tablet  Commonly known as: IRON 325     Fiber-Lax 625 MG tablet  Generic drug: polycarbophil     Fish Oil 1200 MG Caps     fluorometholone 0.1 % ophthalmic suspension  Commonly known as: FML     Folbic 2.5-25-2 MG Tabs  Generic drug: folic acid-pyridoxine-cyancobalamin     glipiZIDE 5 MG extended release tablet  Commonly known as: GLUCOTROL XL     loperamide 2 MG capsule  Commonly known as: IMODIUM     magnesium oxide 400 MG tablet  Commonly known as: MAG-OX     metoprolol succinate 50 MG extended release tablet  Commonly known as: TOPROL XL  Take 1 tablet by mouth daily     Milk of Magnesia 400 MG/5ML suspension  Generic drug: magnesium hydroxide     ocuvite-lutein Caps multivitamin     omeprazole 20 MG delayed release capsule  Commonly known as: PRILOSEC     Refresh Liquigel 1 % ophthalmic gel  Generic drug: carboxymethylcellulose     simvastatin 80 MG tablet  Commonly known as: ZOCOR     SITagliptin 50 MG tablet  Commonly known as: JANUVIA     Systane Balance 0.6 % Soln  Generic drug: Propylene Glycol     tamsulosin 0.4 MG capsule  Commonly known as: FLOMAX               Where to Get Your Medications        Information about where to get these medications is not yet available    Ask your nurse or doctor about these medications  cefdinir 300 MG capsule  metoprolol succinate 50 MG extended release tablet       Activity: activity as tolerated  Diet: regular diet    Pt has been advised to: Follow-up with Josefa Hester MD in 1 week. Follow-up with consultants as recommended by them    Note that over 30 minutes was spent in preparing discharge papers, discussing discharge with patient, medication review, etc.    Signed:  GALLITO Rousseau CNP  11/16/22  14:30    Above note edited to reflect my thoughts     I personally saw, examined and provided care for the patient. Radiographs, labs and medication list were reviewed by me independently. The case was discussed in detail and plans for care were established. Review of JACOB Rousseau   , documentation was conducted and revisions were made as appropriate directly by me. I agree with the above documented exam, problem list, and plan of care.      Joesph Phillips MD  11/16/2022

## 2022-11-15 NOTE — PROGRESS NOTES
[facility]                                Progress note    Subjective: The patient is awake and alert. Feels better today     Objective:    BP (!) 110/52   Pulse 94   Temp 97.5 °F (36.4 °C) (Oral)   Resp 22   Ht 6' 1\" (1.854 m)   Wt 215 lb 8 oz (97.8 kg)   SpO2 96%   BMI 28.43 kg/m²     In: -   Out: 2450   In: -   Out: 2450 [Urine:2450]    General appearance: NAD, conversant  HEENT: AT/NC, MMM  Neck: FROM, supple  Lungs: Diminished   CV: RRR, no MRGs  Vasc: Radial pulses 2+  Abdomen: Soft, non-tender; no masses or HSM  Extremities: No peripheral edema or digital cyanosis  Skin: no rash, lesions or ulcers  Psych: Alert and oriented to person  Neuro: Alert and interactive     Recent Labs     11/13/22  0437 11/14/22  1028 11/15/22  0930   WBC 8.5 11.9* 12.0*   HGB 11.9* 13.8 13.3   HCT 37.9 44.6 42.0    243 226         Recent Labs     11/13/22  0437 11/14/22  1028 11/15/22  0930    138 136   K 4.3 4.4 4.2   CL 94* 94* 93*   CO2 29 33* 29   BUN 34* 37* 37*   CREATININE 1.5* 1.6* 1.7*   CALCIUM 9.2 9.5 9.4         Assessment:    Principal Problem:    CAP (community acquired pneumonia)  Active Problems:    Diabetes ([facility] [address]  Resolved Problems:    * No resolved hospital problems. *      Plan:  80-year-old male with a history of hyperlipidemia and hypertension, atrial fibrillation on oral anticoagulation presents to the ED with shortness of breath and is admitted to telemetry unit with     Acute respiratory failure  -No evidence of pneumonia given normal white count, normal pro calcitonin  -Continue Rocephin and doxyclin but can likely be discontinued tomorrow Doxy  -Supplement O2 demands keeping oxygen saturation greater than 92%.   Currently utilizing 2 L O2  -Likely from volume overload-continue diuresis  -DuoNebs   -Swallow study questionable aspiration pneumonia-doubt  -Strep/Legionella  Respiratory panel  Repeat chest x-ray in a.m. to assess for volume overload  Check BNP in a.m., 2500 on admission, echocardiogram June 2022 EF 55%  Check echocardiogram if not recently done     Decreased blood pressure  BP meds with parameters  Caution with diuresis     Diabetes Mellitus-uncontrolled  -Monitor labs  -ISS glucose control  -Hypoglycemia protocol initiated  Increase sliding scale to high-dose for better glucose control. Hemoglobin A1c 8.7-likely medication noncompliance  For discharge      11/12/22:  -SLP eval with modified liquids to nectar with regular consistency diet. -D/c abx as procal is negative   -Continue to diurese with 0.5 mg bumex BID, down 1.3 L  -Now Currently on room air, 95%   -Continue to monitor through tonight, if significant improvement by tomorrow and normal ambulating pulse ox, he can be discharged home    11/13/22  Decrease  diuresis to daily , duoneb  Still hypoxemic on ambulation   CXR 11/12 better   Renal ins uff  friom duretic     11/14  Family concerned about stroke as pt has had same symptoms previosuly with brain stem stroke  Obtain mri brain   Labs today   If mri negative , he can likely be dc'd     11/15/22:  -MRI negative   -Worsening kidney function, 37/1.7, hold bumex   -Worsening leukocytosis, Start omnicef       Code status: Full  Consultants: None   DVT Prophylaxis Eliquis   PT/OT  Discharge planning     Electronically signed by GALLITO Becerra CNP on 11/15/2022 at 2:27 PM     Above note edited to reflect my thoughts     I personally saw, examined and provided care for the patient. Radiographs, labs and medication list were reviewed by me independently. The case was discussed in detail and plans for care were established. Review of JACOB Becerra   , documentation was conducted and revisions were made as appropriate directly by me. I agree with the above documented exam, problem list, and plan of care.      Rhea Young MD  5:35 PM  11/15/2022

## 2022-11-16 VITALS
WEIGHT: 214.7 LBS | TEMPERATURE: 98.1 F | HEIGHT: 73 IN | BODY MASS INDEX: 28.46 KG/M2 | DIASTOLIC BLOOD PRESSURE: 80 MMHG | OXYGEN SATURATION: 96 % | RESPIRATION RATE: 18 BRPM | SYSTOLIC BLOOD PRESSURE: 105 MMHG | HEART RATE: 82 BPM

## 2022-11-16 LAB — METER GLUCOSE: 179 MG/DL (ref 74–99)

## 2022-11-16 PROCEDURE — 2700000000 HC OXYGEN THERAPY PER DAY

## 2022-11-16 PROCEDURE — 94640 AIRWAY INHALATION TREATMENT: CPT

## 2022-11-16 PROCEDURE — 6370000000 HC RX 637 (ALT 250 FOR IP): Performed by: INTERNAL MEDICINE

## 2022-11-16 PROCEDURE — 6370000000 HC RX 637 (ALT 250 FOR IP): Performed by: FAMILY MEDICINE

## 2022-11-16 PROCEDURE — 82962 GLUCOSE BLOOD TEST: CPT

## 2022-11-16 PROCEDURE — 2580000003 HC RX 258: Performed by: NURSE PRACTITIONER

## 2022-11-16 PROCEDURE — 6370000000 HC RX 637 (ALT 250 FOR IP): Performed by: NURSE PRACTITIONER

## 2022-11-16 PROCEDURE — 92526 ORAL FUNCTION THERAPY: CPT | Performed by: SPEECH-LANGUAGE PATHOLOGIST

## 2022-11-16 PROCEDURE — 97530 THERAPEUTIC ACTIVITIES: CPT

## 2022-11-16 RX ADMIN — IPRATROPIUM BROMIDE AND ALBUTEROL SULFATE 1 AMPULE: 2.5; .5 SOLUTION RESPIRATORY (INHALATION) at 09:34

## 2022-11-16 RX ADMIN — METOPROLOL SUCCINATE 50 MG: 50 TABLET, EXTENDED RELEASE ORAL at 10:12

## 2022-11-16 RX ADMIN — INSULIN LISPRO 8 UNITS: 100 INJECTION, SOLUTION INTRAVENOUS; SUBCUTANEOUS at 12:05

## 2022-11-16 RX ADMIN — ASPIRIN 81 MG: 81 TABLET, COATED ORAL at 10:12

## 2022-11-16 RX ADMIN — CEFDINIR 300 MG: 300 CAPSULE ORAL at 10:12

## 2022-11-16 RX ADMIN — ACETAMINOPHEN 650 MG: 325 TABLET ORAL at 10:12

## 2022-11-16 RX ADMIN — IPRATROPIUM BROMIDE AND ALBUTEROL SULFATE 1 AMPULE: 2.5; .5 SOLUTION RESPIRATORY (INHALATION) at 12:19

## 2022-11-16 RX ADMIN — FERROUS SULFATE TAB 325 MG (65 MG ELEMENTAL FE) 325 MG: 325 (65 FE) TAB at 10:11

## 2022-11-16 RX ADMIN — APIXABAN 2.5 MG: 2.5 TABLET, FILM COATED ORAL at 10:12

## 2022-11-16 RX ADMIN — SODIUM CHLORIDE, PRESERVATIVE FREE 10 ML: 5 INJECTION INTRAVENOUS at 10:13

## 2022-11-16 RX ADMIN — TAMSULOSIN HYDROCHLORIDE 0.4 MG: 0.4 CAPSULE ORAL at 10:12

## 2022-11-16 ASSESSMENT — PAIN SCALES - GENERAL
PAINLEVEL_OUTOF10: 3
PAINLEVEL_OUTOF10: 0
PAINLEVEL_OUTOF10: 2
PAINLEVEL_OUTOF10: 0
PAINLEVEL_OUTOF10: 0

## 2022-11-16 ASSESSMENT — PAIN DESCRIPTION - ORIENTATION: ORIENTATION: LEFT

## 2022-11-16 ASSESSMENT — PAIN DESCRIPTION - LOCATION: LOCATION: KNEE

## 2022-11-16 ASSESSMENT — PAIN DESCRIPTION - DESCRIPTORS: DESCRIPTORS: THROBBING

## 2022-11-16 NOTE — CARE COORDINATION
Social Work / Discharge Planning : Therapy am/pac today is an 18/24. PHYLICIA spoke to Rosemarie Bazan from [facility] and aware patient has a discharge order back to [place]. [facility] [employer]. Awaiting if 02 needed. No DME preference and referral went to [facility]. [facility]Wait pulse ox. [facility] at [address] can set up [address]. [facility] followed up with patient and daughter and in agreement with plans at discharge. They do NOT have a HHC preference. Referral went to Briseyda from [facility]. She was updated on discharge and [address] orders. AWait pulse ox. PHYLICIA to follow. Electronically signed by MARISELA Babb on 11/16/22 at 12:06 PM EST     Addendum:AWait pulse ox. DME choices and no preference. [facility] is in network with insurance. AWait pulse ox. If needed will need DME order and to please call Ngoc Reyes from [address] and fax # [phone]. PHYLICIA to follow. Electronically signed by MARISELA Babb on 11/16/22 at 12:35 PM EST     Addendum: PHYLICIA noted Pulse ox. Patient does NOT quality for 02,. PHYLICIA to  follow. Electronically signed by MARISELA Babb on 11/16/22 at 1:28 PM EST  The Plan for Transition of Care is related to the following treatment goals: Back to [facility]. The Patient and/or patient representative daughter was provided with a choice of provider and agrees   with the discharge plan.  [x] Yes [] No
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
## 2022-11-16 NOTE — PROGRESS NOTES
Pulse ox was 96% on room air at rest.   Ambulated patient on room air. Oxygen saturation was 90% on room air while ambulating.

## 2022-11-16 NOTE — PROGRESS NOTES
SPEECH LANGUAGE PATHOLOGY  DAILY PROGRESS NOTE        PATIENT NAME:  Ruben Moseley      :  1934          TODAY'S DATE:  2022 ROOM:  [address] [place] [address]        SWALLOWING    Patient seen at bedside for a follow up dysphagia management. Daughter at bedside. Speech Pathologist (SLP) completed education with the patient and family regarding type of swallowing impairment. Reviewed current solid/liquid consistency diet recommendations and discussed compensatory strategies to ensure safe PO intake. Reviewed aspiration precautions. Encouraged patient and/or family to engage SLP in unstructured Q&A session relative to identified deficit areas; indicated understanding of all information provided via satisfactory verbal response. Patient report toleration of current diet. Reviewed compensatory strategies. Confirmed understanding. Patient consuming afternoon meal with good toleration; no overt s/s of aspiration. Good carryover of compensatory strategies. Patient completed laryngeal strength/ ROM therapeutic exercises to improve airway protection for the least restrictive PO diet minimal verbal prompts. He produced fair+ outcome. SLP encouraged to complete daily as able. He was instructed to discontinue the exercises if he experienced any pain or discomfort. Patient was in agreement.       DIAGNOSIS:   Clinical indicators of pharyngeal phase dysphagia including frequent overt s/s of aspiration with thin liquid                          DIET RECOMMENDATIONS:  Regular consistency solids (IDDSI level 7) with nectar consistency (mildly thick - IDDSI level 2) liquids      MEDICATION ADMINISTRATION, Administer medication whole, ONE AT A TIME, with sips of thickened liquid OR Administer medication whole, ONE AT A TIME, in pudding/applesauce                FEEDING RECOMMENDATIONS:                         Assistance level:  No assistance needed                          Compensatory strategies recommended: Small bites/sips      Education- Pt educated on results and POC. Pt trained on compensatory strategies for safe swallow with good outcome. Questions answered. Will continue SP intervention as per previously established POC. [place].  Gina ZENG, [facility]-SLP  SP. [id number]      CPT code(s) 85275  dysphagia tx  Total minutes :  15 minutes

## 2022-11-16 NOTE — DISCHARGE INSTRUCTIONS
DIET RECOMMENDATIONS:  Regular consistency solids (IDDSI level 7) with nectar consistency (mildly thick - IDDSI level 2) liquids      MEDICATION ADMINISTRATION, Administer medication whole, ONE AT A TIME, with sips of thickened liquid OR Administer medication whole, ONE AT A TIME, in pudding/applesauce    Follow-up with PCP within 1 week to recheck BMP to assess renal function and white count

## 2022-11-16 NOTE — PROGRESS NOTES
Patient incontinent urine. External catheter failed. Unmeasured output noted. External catheter replaced.

## 2022-11-16 NOTE — PROGRESS NOTES
Physical Therapy  Facility/Department: [address] INTERMEDIATE  Daily Treatment Note  NAME: Veronica Velazco  : 1934  MRN: [id number]    Date of Service: 2022    Patient Diagnosis(es): The encounter diagnosis was Acute respiratory failure with hypoxia ([address] Evaluating Therapist: Joana PT     Room #:  9718/7170-K  Diagnosis:  SOB (shortness of breath) [R06.02]  Acute respiratory failure with hypoxia (HCC) [J96.01]  PMHx/PSHx:  A-fib, Brainstem stroke, HTN, dysphagia  Precautions:  Fall risk        Social:  Pt admitted from [facility]. Ambulates with rollator. Pt has B AFO's but states does not always need to wear them. Initial Evaluation  Date: 22 Treatment     Date:  Short Term/ Long Term   Goals   Was pt agreeable to Eval/treatment? yes Yes     Does pt have pain? No c/o pain No c/o pain     Bed Mobility  Rolling: independent  Supine to sit: independent  Sit to supine: NT  Scooting: independent Rolling:  NT  Supine to sit:  independent  Sit to supine:  NT  Scooting:  independent to sitting EOB independent   Transfers Sit to stand: SBA  Stand to sit: SBA  Stand pivot: SBA Sit to stand: SBA  Stand to sit: SBA  Stand pivot: SBA with w/w independent   Ambulation    40 feet with ww with CG/SBA 55 feet with w/w  feet with ww independenet   Stair Negotiation  Ascended and descended  NT NT  N/A   LE strength     B ankles 3/5 all else 3+/5    4/5   balance      Fair+ with ww Sitting EOB:  independent  Standing with w/w SBA     AM-PAC Raw score                       Patient education  Pt educated on PT objectives during treatment session, hand placement during transfers, walker usage and safety during the pivot. Patient response to education:   Pt verbalized understanding Pt demonstrated skill Pt requires further education in this area   yes With cueing yes     ASSESSMENT:    Comments:  Pt found in bed and left sitting up in the chair with call light in reach and chair alarm on. Treatment:  Patient practiced and was instructed in the following treatment:   Functional mobility performed as documented above. No report of dizziness during functional mobility. Cueing required of hand placement during transfers. Pt ambulates with slow gait speed. No LOB. Cueing required for walker safety when pivoting into a chair. PLAN:    Patient is making good progress towards established goals. Will continue with current POC.      Time in  1033  Time out  1106    Total Treatment Time  33 minutes     CPT codes:    [x] Therapeutic activities 30015 33 minutes  [] Therapeutic exercises 06201  minutes      Ankur Bonner, [phone]

## 2022-11-16 NOTE — PROGRESS NOTES
Ambulated patient to bathroom. Patient on room air. SPO2 dropped to 84%. Patient placed on 1 liter nasal cannula. SPO2 94%.

## 2022-11-21 NOTE — PROGRESS NOTES
Physician Progress Note      Elijah Gallego  [facility] #:                  [id number]  :                       1934  ADMIT DATE:       11/10/2022 11:05 AM  DISCH DATE:        2022 3:00 PM  RESPONDING  PROVIDER #:        Nuzhat Parker MD          QUERY TEXT:    Patient admitted with fluid overload. Noted documentation of acute respiratory   failure in H&P. In order to support the diagnosis of acute respiratory   failure, please include additional clinical indicators in your documentation. Or please document if the diagnosis of acute respiratory failure has been   ruled out after further study. The medical record reflects the following:  Risk Factors: fluid overload  Clinical Indicators: no documentation of respiratory distress, accessory   muscle usage, conversational dyspnea. H&P \"EMS was called to the patient's home and patient was found to be 88% on   room air. Patient is currently on 2 L O2 with saturation of 93%. \"  ER \" Pulmonary effort is normal.\"  Treatment: oxygen, duonebs, diuresis, echo    Acute Respiratory Failure Clinical Indicators per [employer] Training Guide and   Quick Reference Guide:  pO2 < 60 mmHg or SpO2 (pulse oximetry) < 91% breathing room air  pCO2 > 50 and pH < 7.35  P/F ratio (pO2 / FIO2) < 300  pO2 decrease or pCO2 increase by 10 mmHg from baseline (if known)  Supplemental oxygen of 40% or more  Presence of respiratory distress, tachypnea, dyspnea, shortness of breath,   wheezing  Unable to speak in complete sentences  Use of accessory muscles to breathe  Extreme anxiety and feeling of impending doom  Tripod position  Confusion/altered mental status/obtunded    Thank you,  Rafael Bee RN, CCDS  Clinical Documentation Improvement Specialist  [email or web address]  Options provided:  -- Acute Respiratory Failure ruled out after study  -- Acute Respiratory Failure as evidenced by, Please document evidence.   -- Hypoxia only  -- Other - I will add my own diagnosis  -- Disagree - Not applicable / Not valid  -- Disagree - Clinically unable to determine / Unknown  -- Refer to Clinical Documentation Reviewer    PROVIDER RESPONSE TEXT:    This patient is in acute respiratory failure as evidenced by hyopxemia and   hypercapnia    Query created by: Sophia Donovan on 11/12/2022 8:13 AM      Electronically signed by:  Aramis Landeros MD 11/21/2022 9:59 AM

## 2022-12-01 ENCOUNTER — OFFICE VISIT (OUTPATIENT)
Dept: CARDIOLOGY CLINIC | Age: 87
End: 2022-12-01
Payer: MEDICARE

## 2022-12-01 VITALS
WEIGHT: 231.6 LBS | BODY MASS INDEX: 30.69 KG/M2 | HEART RATE: 68 BPM | RESPIRATION RATE: 18 BRPM | DIASTOLIC BLOOD PRESSURE: 69 MMHG | SYSTOLIC BLOOD PRESSURE: 119 MMHG | HEIGHT: 73 IN

## 2022-12-01 DIAGNOSIS — I48.19 PERSISTENT ATRIAL FIBRILLATION (HCC): ICD-10-CM

## 2022-12-01 DIAGNOSIS — I25.10 CORONARY ARTERY DISEASE INVOLVING NATIVE CORONARY ARTERY OF NATIVE HEART WITHOUT ANGINA PECTORIS: Primary | ICD-10-CM

## 2022-12-01 DIAGNOSIS — I10 PRIMARY HYPERTENSION: ICD-10-CM

## 2022-12-01 DIAGNOSIS — I50.30 HEART FAILURE WITH PRESERVED EJECTION FRACTION, UNSPECIFIED HF CHRONICITY (HCC): ICD-10-CM

## 2022-12-01 PROBLEM — R79.89 ELEVATED SERUM CREATININE: Status: RESOLVED | Noted: 2022-06-13 | Resolved: 2022-12-01

## 2022-12-01 PROBLEM — I63.211 CEREBRAL INFARCTION DUE TO OCCLUSION OF RIGHT VERTEBRAL ARTERY (HCC): Status: RESOLVED | Noted: 2021-03-17 | Resolved: 2022-12-01

## 2022-12-01 PROBLEM — R06.02 SOB (SHORTNESS OF BREATH): Status: RESOLVED | Noted: 2022-11-10 | Resolved: 2022-12-01

## 2022-12-01 PROBLEM — R29.90 STROKE-LIKE SYMPTOMS: Status: RESOLVED | Noted: 2022-06-13 | Resolved: 2022-12-01

## 2022-12-01 PROBLEM — J18.9 CAP (COMMUNITY ACQUIRED PNEUMONIA): Status: RESOLVED | Noted: 2022-11-11 | Resolved: 2022-12-01

## 2022-12-01 PROCEDURE — 1123F ACP DISCUSS/DSCN MKR DOCD: CPT | Performed by: INTERNAL MEDICINE

## 2022-12-01 PROCEDURE — 93000 ELECTROCARDIOGRAM COMPLETE: CPT | Performed by: INTERNAL MEDICINE

## 2022-12-01 PROCEDURE — 99215 OFFICE O/P EST HI 40 MIN: CPT | Performed by: INTERNAL MEDICINE

## 2022-12-01 RX ORDER — CEFDINIR 300 MG/1
300 CAPSULE ORAL EVERY 12 HOURS
COMMUNITY

## 2022-12-01 NOTE — PROGRESS NOTES
tablet Take 80 mg by mouth daily      loperamide (IMODIUM) 2 MG capsule Take 2 mg by mouth 4 times daily as needed for Diarrhea      bisacodyl (DULCOLAX) 5 MG EC tablet Take 5 mg by mouth daily as needed for Constipation      aluminum & magnesium hydroxide-simethicone (MYLANTA) 400-400-40 MG/5ML SUSP Take 30 mLs by mouth every 6 hours as needed      aspirin 81 MG EC tablet Take 1 tablet by mouth daily 30 tablet 3    apixaban (ELIQUIS) 2.5 MG TABS tablet Take 1 tablet by mouth 2 times daily (Patient taking differently: Take 5 mg by mouth 2 times daily) 60 tablet 1    FIBER- MG tablet Take 3 tablets by mouth 2 times daily      SITagliptin (JANUVIA) 50 MG tablet Take 50 mg by mouth daily      folic acid-pyridoxine-cyancobalamin (FOLBIC) 2.5-25-2 MG TABS Take 1 tablet by mouth daily      REFRESH LIQUIGEL 1 % ophthalmic gel Place into the right eye 4 times daily  12    fluorometholone (FML) 0.1 % ophthalmic suspension Place 1 drop into the right eye 2 times daily      Propylene Glycol 0.6 % SOLN Apply to eye as needed      MILK OF MAGNESIA 400 MG/5ML suspension Take 30 mLs by mouth daily as needed  1    tamsulosin (FLOMAX) 0.4 MG capsule Take 0.4 mg by mouth daily       ferrous sulfate 325 (65 Fe) MG tablet Take 325 mg by mouth daily (with breakfast)      acetaminophen (TYLENOL) 325 MG tablet Take 2 tablets by mouth every 4 hours as needed for Pain 120 tablet 3    glipiZIDE (GLUCOTROL XL) 5 MG extended release tablet Take 5 mg by mouth daily      magnesium oxide (MAG-OX) 400 MG tablet Take 400 mg by mouth daily. No current facility-administered medications for this visit.         No Known Allergies    Vitals:    12/01/22 1024   BP: 119/69   Pulse: 68   Resp: 18   Weight: 231 lb 9.6 oz (105.1 kg)   Height: 6' 1\" (1.854 m)             SUBJECTIVE: Santhosh Mayes presents to the office today for re-evaluation of chronic cardiac diagnoses and hfu.  I reviewed records from both [facility] and November hospitals, including EKG tracings  Most recent admit was for SOB - no clear diagnosis established but seems like the consensus was volume overload. Diuresed in hospital with jump in Creat, now on no diuretic. Hx of non functional left kidney. 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 Now In Assisted Living with a walker. Had normal echo and 30 day monitor at time of CVA)    Compliant with NOAC. Daughter here today            Objective:   Physical Exam   Constitutional: He is oriented to person, place, and time. overweight. He is cooperative. See vitals above.  d,  Neck: No  JVD present. Cardiovascular: irregular rhythm, S1 normal, S2 normal, intact distal pulses and normal pulses. PMI is not displaced. Exam reveals no gallop. No murmur heard. Carotid bruit is not present   Pulmonary/Chest: Effort normal with basilar rales  Abdominal: Soft. Normal appearance and bowel sounds are normal  Musculoskeletal: He exhibits no edema  Uses walker    Neurological: He is alert and oriented to person, place, and time  Skin: Skin is warm and dry. No bruising, no ecchymosis   Psychiatric: He has a normal mood and affect. His behavior is normal. Thought content normal.     EKG: atrial fibrillation with CVR    ASSESSMENT & PLAN:    Patient Active Problem List   Diagnosis    HX of atrial fibrillation :        Hyperlipidemia LDL goal < 70:   On Zocor    Hypertension:     Aortic insufficiency:     Diabetes mellitus    Single kidney    Coronary atherosclerosis of native coronary artery: s/p ACB 2017    DVT (deep venous thrombosis): on life long coumadin     Patient with recent development of SOB likely due to loss of sinus rhythm  Caution with diuretic usage due to CKD in setting of single functioning kidney  On NOAC at dose adjusted for age and renal function  We discussed OP DCCV for symptoms - R/B enumerated - will arrange

## 2022-12-15 ENCOUNTER — HOSPITAL ENCOUNTER (OUTPATIENT)
Dept: PREADMISSION TESTING | Age: 87
Discharge: HOME | End: 2022-12-15

## 2022-12-15 VITALS — HEIGHT: 73 IN | BODY MASS INDEX: 28.36 KG/M2 | WEIGHT: 214 LBS

## 2022-12-15 RX ORDER — GUAIFENESIN AND DEXTROMETHORPHAN HYDROBROMIDE 100; 10 MG/5ML; MG/5ML
5 SOLUTION ORAL EVERY 4 HOURS PRN
COMMUNITY
End: 2022-12-15

## 2022-12-15 RX ORDER — OMEGA-3 FATTY ACIDS CAP DELAYED RELEASE 1000 MG 1000 MG
1000 CAPSULE DELAYED RELEASE ORAL
COMMUNITY

## 2022-12-15 NOTE — PROGRESS NOTES
[facility] PRE-ADMISSION TESTING INSTRUCTIONS    The Preadmission Testing patient is instructed accordingly using the following criteria (check applicable):    ARRIVAL INSTRUCTIONS:  [x] Parking the day of Surgery is located in the Main Entrance lot. Upon entering the door, make an immediate right to the surgery reception desk    [x] Bring photo ID and insurance card    [] Bring in a copy of Living will or Durable Power of  papers. [x] Please be sure to arrange for responsible adult to provide transportation to and from the hospital    [x] Please arrange for responsible adult to be with you for the 24 hour period post procedure due to having anesthesia    [x] If you awake am of surgery not feeling well or have temperature >100 please call [phone]    GENERAL INSTRUCTIONS:    [x] Nothing by mouth after midnight, including gum, candy, mints or water    [x] You may brush your teeth, but do not swallow any water    [x] Take medications as instructed with 1-2 oz of water    [x] Stop herbal supplements and vitamins 5 days prior to procedure    [x] Follow preop dosing of blood thinners per physician instructions    [] Take 1/2 dose of evening insulin, but no insulin after midnight    [x] No oral diabetic medications after midnight    [x] If diabetic and have low blood sugar or feel symptomatic, take 1-2oz apple juice only    [] Bring inhalers day of surgery    [] Bring C-PAP/ Bi-Pap day of surgery    [] Bring urine specimen day of surgery    [x] Shower or bath with soap, lather and rinse well, AM of Surgery, no lotion, powders or creams to surgical site    [] Follow bowel prep as instructed per surgeon    [x] No tobacco products within 24 hours of surgery     [x] No alcohol or illegal drug use within 24 hours of surgery.     [x] Jewelry, body piercing's, eyeglasses, contact lenses and dentures are not permitted into surgery (bring cases)      [x] Please do not wear any nail polish, make up or hair products on the day of surgery    [x] You can expect a call the business day prior to procedure to notify you if your arrival time changes    [] If you receive a survey after surgery we would greatly appreciate your comments    [] Parent/guardian of a minor must accompany their child and remain on the premises  the entire time they are under our care     [] Pediatric patients may bring favorite toy, blanket or comfort item with them    [x] A caregiver or family member must remain with the patient during their stay if they are mentally handicapped, have dementia, disoriented or unable to use a call light or would be a safety concern if left unattended    [x] Please notify surgeon if you develop any illness between now and time of surgery (cold, cough, sore throat, fever, nausea, vomiting) or any signs of infections  including skin, wounds, and dental.    []  The Outpatient Pharmacy is available to fill your prescription here on your day of surgery, ask your preop nurse for details    [] Other instructions    EDUCATIONAL MATERIALS PROVIDED:    [] PAT Preoperative Education Packet/Booklet     [] Medication List    [] Transfusion bracelet applied with instructions    [] Shower with soap, lather and rinse well, and use CHG wipes provided the evening before surgery as instructed    [] Incentive spirometer with instructions

## 2022-12-15 NOTE — PROGRESS NOTES
Patient is a resident at [facility]. PAT telephone assessment completed with Bernett Barthel, LPN at facility. Patient is alert and oriented, signs his own documents  He is a full code. He is ambulatory with a walker/rollator. He is able to use a call light without difficulty. He is on a thickened diet. He is able to take meds with thickened water. His daughter Patel Walker will be his transportation. Instructions faxed to facility. Of note, his wife passed away this week.

## 2022-12-16 ENCOUNTER — HOSPITAL ENCOUNTER (OUTPATIENT)
Dept: INPATIENT UNIT | Age: 87
Setting detail: OUTPATIENT SURGERY
Discharge: HOME | End: 2022-12-16
Payer: MEDICARE

## 2022-12-16 ENCOUNTER — ANESTHESIA (OUTPATIENT)
Dept: INPATIENT UNIT | Age: 87
End: 2022-12-16

## 2022-12-16 ENCOUNTER — ANESTHESIA EVENT (OUTPATIENT)
Dept: INPATIENT UNIT | Age: 87
End: 2022-12-16

## 2022-12-16 VITALS
SYSTOLIC BLOOD PRESSURE: 101 MMHG | DIASTOLIC BLOOD PRESSURE: 61 MMHG | HEART RATE: 60 BPM | OXYGEN SATURATION: 92 % | RESPIRATION RATE: 22 BRPM | TEMPERATURE: 97.6 F

## 2022-12-16 LAB
EKG ATRIAL RATE: 55 BPM
EKG P AXIS: 49 DEGREES
EKG P-R INTERVAL: 252 MS
EKG Q-T INTERVAL: 486 MS
EKG QRS DURATION: 92 MS
EKG QTC CALCULATION (BAZETT): 464 MS
EKG R AXIS: 41 DEGREES
EKG T AXIS: 28 DEGREES
EKG VENTRICULAR RATE: 55 BPM
METER GLUCOSE: 137 MG/DL (ref 74–99)

## 2022-12-16 PROCEDURE — 2500000003 HC RX 250 WO HCPCS

## 2022-12-16 PROCEDURE — 7100000010 HC PHASE II RECOVERY - FIRST 15 MIN

## 2022-12-16 PROCEDURE — 92960 CARDIOVERSION ELECTRIC EXT: CPT

## 2022-12-16 PROCEDURE — 7100000011 HC PHASE II RECOVERY - ADDTL 15 MIN

## 2022-12-16 PROCEDURE — 6360000002 HC RX W HCPCS

## 2022-12-16 PROCEDURE — 2580000003 HC RX 258

## 2022-12-16 PROCEDURE — 82962 GLUCOSE BLOOD TEST: CPT

## 2022-12-16 PROCEDURE — 92960 CARDIOVERSION ELECTRIC EXT: CPT | Performed by: INTERNAL MEDICINE

## 2022-12-16 PROCEDURE — 3700000000 HC ANESTHESIA ATTENDED CARE

## 2022-12-16 RX ORDER — SODIUM CHLORIDE 9 MG/ML
INJECTION, SOLUTION INTRAVENOUS CONTINUOUS PRN
Status: DISCONTINUED | OUTPATIENT
Start: 2022-12-16 | End: 2022-12-16 | Stop reason: SDUPTHER

## 2022-12-16 RX ORDER — SODIUM CHLORIDE 9 MG/ML
INJECTION, SOLUTION INTRAVENOUS PRN
OUTPATIENT
Start: 2022-12-16

## 2022-12-16 RX ORDER — SODIUM CHLORIDE 0.9 % (FLUSH) 0.9 %
5-40 SYRINGE (ML) INJECTION PRN
OUTPATIENT
Start: 2022-12-16

## 2022-12-16 RX ORDER — PROPOFOL 10 MG/ML
INJECTION, EMULSION INTRAVENOUS PRN
Status: DISCONTINUED | OUTPATIENT
Start: 2022-12-16 | End: 2022-12-16 | Stop reason: SDUPTHER

## 2022-12-16 RX ORDER — SODIUM CHLORIDE 0.9 % (FLUSH) 0.9 %
5-40 SYRINGE (ML) INJECTION EVERY 12 HOURS SCHEDULED
OUTPATIENT
Start: 2022-12-16

## 2022-12-16 RX ORDER — LIDOCAINE HYDROCHLORIDE 20 MG/ML
INJECTION, SOLUTION INFILTRATION; PERINEURAL PRN
Status: DISCONTINUED | OUTPATIENT
Start: 2022-12-16 | End: 2022-12-16 | Stop reason: SDUPTHER

## 2022-12-16 RX ADMIN — SODIUM CHLORIDE: 9 INJECTION, SOLUTION INTRAVENOUS at 08:05

## 2022-12-16 RX ADMIN — LIDOCAINE HYDROCHLORIDE 40 MG: 20 INJECTION, SOLUTION INFILTRATION; PERINEURAL at 08:31

## 2022-12-16 RX ADMIN — PROPOFOL 70 MG: 10 INJECTION, EMULSION INTRAVENOUS at 08:31

## 2022-12-16 ASSESSMENT — PAIN SCALES - GENERAL
PAINLEVEL_OUTOF10: 0
PAINLEVEL_OUTOF10: 0

## 2022-12-16 ASSESSMENT — ENCOUNTER SYMPTOMS: SHORTNESS OF BREATH: 1

## 2022-12-16 ASSESSMENT — PAIN - FUNCTIONAL ASSESSMENT: PAIN_FUNCTIONAL_ASSESSMENT: 0-10

## 2022-12-16 NOTE — PROCEDURES
Preprocedure diagnosis:  Persistent atrial fibrillation  Procedures, cardioversion elective arrhythmia external    Preprocedure diagnosis: A. Fib/flutter    Prior tests anticoagulated    Primary procedure  Written informed consent was obtained, the cardioversion was performed under stable fasting condition, self-adhesive anterior-posterior defibrillation pads were applied, the defibrillator was programmed to deliver energy in a synchronized fashion with a EKG. The patient was set up for monitoring of surface EKG and pulse oximetry continuously. Blood pressure was monitored and with automatic cuff measurements. Supplemental oxygen was administered. The procedure was performed under anesthesia by anesthesiology. After ensuring adequate anesthesia, DC CV was performed by delivering 200 J of direct current delivered in a synchronized fashion. Result: Successful cardioversion to sinus rhythm, confirmed on 12-lead EKG. Complication: None    Patient was monitored until awake and vitals remained stable. Holger Santos M.D.   [facility] cardiology
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
## 2022-12-16 NOTE — ANESTHESIA POSTPROCEDURE EVALUATION
Department of Anesthesiology  Postprocedure Note    Patient: Sumit Baca  MRN: [id number]  YOB: 1934  Date of evaluation: 12/16/2022      Procedure Summary     Date: 12/16/22 Room / Location: [facility] Stage I    Anesthesia Start: 0828 Anesthesia Stop: 5427    Procedure: CARDIOVERSION Diagnosis: Paroxysmal atrial fibrillation    Scheduled Providers:  Responsible Provider: Daksha Forte MD    Anesthesia Type: MAC ASA Status: 3          Anesthesia Type: MAC    Adelita Phase I: Adelita Score: 10    Adelita Phase II: Adelita Score: 8      Anesthesia Post Evaluation    Patient location during evaluation: PACU  Patient participation: complete - patient participated  Level of consciousness: awake  Pain score: 3  Airway patency: patent  Nausea & Vomiting: no nausea and no vomiting  Complications: no  Cardiovascular status: blood pressure returned to baseline  Respiratory status: acceptable  Hydration status: euvolemic

## 2022-12-17 ENCOUNTER — APPOINTMENT (OUTPATIENT)
Dept: GENERAL RADIOLOGY | Age: 87
End: 2022-12-17
Payer: MEDICARE

## 2022-12-17 ENCOUNTER — HOSPITAL ENCOUNTER (EMERGENCY)
Age: 87
Discharge: HOME | End: 2022-12-17
Attending: EMERGENCY MEDICINE
Payer: MEDICARE

## 2022-12-17 ENCOUNTER — APPOINTMENT (OUTPATIENT)
Dept: CT IMAGING | Age: 87
End: 2022-12-17
Payer: MEDICARE

## 2022-12-17 VITALS
HEART RATE: 82 BPM | OXYGEN SATURATION: 93 % | HEIGHT: 73 IN | WEIGHT: 222 LBS | BODY MASS INDEX: 29.42 KG/M2 | TEMPERATURE: 97.4 F | DIASTOLIC BLOOD PRESSURE: 64 MMHG | RESPIRATION RATE: 20 BRPM | SYSTOLIC BLOOD PRESSURE: 138 MMHG

## 2022-12-17 DIAGNOSIS — R53.1 GENERALIZED WEAKNESS: ICD-10-CM
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
## 2022-12-17 LAB
ALBUMIN SERPL-MCNC: 3.5 G/DL (ref 3.5–5.2)
ALP BLD-CCNC: 71 U/L (ref 40–129)
ALT SERPL-CCNC: 12 U/L (ref 0–40)
ANION GAP SERPL CALCULATED.3IONS-SCNC: 10 MMOL/L (ref 7–16)
AST SERPL-CCNC: 19 U/L (ref 0–39)
BACTERIA: ABNORMAL /HPF
BASOPHILS ABSOLUTE: 0.05 E9/L (ref 0–0.2)
BASOPHILS RELATIVE PERCENT: 0.8 % (ref 0–2)
BILIRUB SERPL-MCNC: 0.9 MG/DL (ref 0–1.2)
BILIRUBIN URINE: NEGATIVE
BLOOD, URINE: NEGATIVE
BUN BLDV-MCNC: 20 MG/DL (ref 6–23)
CALCIUM SERPL-MCNC: 9.5 MG/DL (ref 8.6–10.2)
CHLORIDE BLD-SCNC: 103 MMOL/L (ref 98–107)
CLARITY: CLEAR
CO2: 28 MMOL/L (ref 22–29)
COLOR: YELLOW
CREAT SERPL-MCNC: 1.3 MG/DL (ref 0.7–1.2)
EKG ATRIAL RATE: 52 BPM
EKG P AXIS: 58 DEGREES
EKG P-R INTERVAL: 250 MS
EKG Q-T INTERVAL: 494 MS
EKG QRS DURATION: 98 MS
EKG QTC CALCULATION (BAZETT): 489 MS
EKG R AXIS: 73 DEGREES
EKG T AXIS: 16 DEGREES
EKG VENTRICULAR RATE: 59 BPM
EOSINOPHILS ABSOLUTE: 0.3 E9/L (ref 0.05–0.5)
EOSINOPHILS RELATIVE PERCENT: 4.6 % (ref 0–6)
GFR SERPL CREATININE-BSD FRML MDRD: 53 ML/MIN/1.73
GLUCOSE BLD-MCNC: 165 MG/DL (ref 74–99)
GLUCOSE URINE: >=1000 MG/DL
HCT VFR BLD CALC: 36.8 % (ref 37–54)
HEMOGLOBIN: 11.7 G/DL (ref 12.5–16.5)
IMMATURE GRANULOCYTES #: 0.02 E9/L
IMMATURE GRANULOCYTES %: 0.3 % (ref 0–5)
KETONES, URINE: NEGATIVE MG/DL
LEUKOCYTE ESTERASE, URINE: NEGATIVE
LYMPHOCYTES ABSOLUTE: 1.57 E9/L (ref 1.5–4)
LYMPHOCYTES RELATIVE PERCENT: 23.9 % (ref 20–42)
MCH RBC QN AUTO: 29.5 PG (ref 26–35)
MCHC RBC AUTO-ENTMCNC: 31.8 % (ref 32–34.5)
MCV RBC AUTO: 92.9 FL (ref 80–99.9)
MONOCYTES ABSOLUTE: 0.61 E9/L (ref 0.1–0.95)
MONOCYTES RELATIVE PERCENT: 9.3 % (ref 2–12)
NEUTROPHILS ABSOLUTE: 4.03 E9/L (ref 1.8–7.3)
NEUTROPHILS RELATIVE PERCENT: 61.1 % (ref 43–80)
NITRITE, URINE: NEGATIVE
PDW BLD-RTO: 17.4 FL (ref 11.5–15)
PH UA: 6.5 (ref 5–9)
PLATELET # BLD: 174 E9/L (ref 130–450)
PMV BLD AUTO: 9.2 FL (ref 7–12)
POTASSIUM SERPL-SCNC: 4.3 MMOL/L (ref 3.5–5)
PRO-BNP: 2789 PG/ML (ref 0–450)
PROTEIN UA: NEGATIVE MG/DL
RBC # BLD: 3.96 E12/L (ref 3.8–5.8)
RBC UA: ABNORMAL /HPF (ref 0–2)
SODIUM BLD-SCNC: 141 MMOL/L (ref 132–146)
SPECIFIC GRAVITY UA: 1.01 (ref 1–1.03)
TOTAL PROTEIN: 6.9 G/DL (ref 6.4–8.3)
TROPONIN, HIGH SENSITIVITY: 39 NG/L (ref 0–11)
TROPONIN, HIGH SENSITIVITY: 41 NG/L (ref 0–11)
UROBILINOGEN, URINE: 0.2 E.U./DL
WBC # BLD: 6.6 E9/L (ref 4.5–11.5)
WBC UA: ABNORMAL /HPF (ref 0–5)

## 2022-12-17 PROCEDURE — 85025 COMPLETE CBC W/AUTO DIFF WBC: CPT

## 2022-12-17 PROCEDURE — 80053 COMPREHEN METABOLIC PANEL: CPT

## 2022-12-17 PROCEDURE — 70450 CT HEAD/BRAIN W/O DYE: CPT

## 2022-12-17 PROCEDURE — 99285 EMERGENCY DEPT VISIT HI MDM: CPT

## 2022-12-17 PROCEDURE — 93005 ELECTROCARDIOGRAM TRACING: CPT | Performed by: EMERGENCY MEDICINE

## 2022-12-17 PROCEDURE — 83880 ASSAY OF NATRIURETIC PEPTIDE: CPT

## 2022-12-17 PROCEDURE — 71045 X-RAY EXAM CHEST 1 VIEW: CPT

## 2022-12-17 PROCEDURE — 84484 ASSAY OF TROPONIN QUANT: CPT

## 2022-12-17 PROCEDURE — 72125 CT NECK SPINE W/O DYE: CPT

## 2022-12-17 PROCEDURE — 81001 URINALYSIS AUTO W/SCOPE: CPT

## 2022-12-17 NOTE — ED PROVIDER NOTES
Department of Emergency Medicine   ED  Provider Note  Admit Date/RoomTime: 12/17/2022  2:15 PM  ED Room: 10/10          History of Present Illness:  12/17/22, Time: 2:19 PM EST  Chief Complaint   Patient presents with    Fall     This morning, hit head, -LOC, eliquis. No complaints                Reyna Lucas is a 80 y.o. male presenting to the ED for evaluation after a fall, beginning earlier this morning. Patient is here with his daughter who is providing most of the history. Patient resides at an assisted living facility. Yesterday patient had an outpatient cardiac cardioversion for atrial fibrillation. Patient did have some fatigue and sleepiness from the sedation. This morning patient's daughter was with him when he was using his walker to walk to the bathroom and appeared rather weak and fatigued. Patient then turned to go into the bathroom and fell. Daughter was concerned that he may have been feeling lightheaded, patient states he does not recall having any symptoms while walking. Patient did appear to hit his head but there was no loss of consciousness. Patient denies having any headache, no neck or back pain. Daughter states that patient recently lost his wife 3 days ago, was placed on Xanax initially once daily, now taking twice daily. Daughter had concern that he may be weak and sleepy from the medication. Patient has been eating well, denies any dysuria, no nausea/vomiting/diarrhea. Patient denies any chest pain, no dyspnea on exertion. No recent fevers or cough.     Review of Systems:   Pertinent positives and negatives are stated within HPI, all other systems reviewed and are negative.        --------------------------------------------- PAST HISTORY ---------------------------------------------  Past Medical History:  has a past medical history of A-fib ([facility] Acquired absence of kidney, Anemia, BPH (benign prostatic hyperplasia), Brainstem stroke ([facility] Colon cancer ([facility] Congenital absence of one kidney, DM (diabetes mellitus), type 2 ([facility] Dysphagia, GERD (gastroesophageal reflux disease), Hyperlipidemia, Hypertension, Overactive bladder, PE (pulmonary thromboembolism) ([facility] SSS (sick sinus syndrome) ([facility] and Vertebral artery occlusion, right. Past Surgical History:  has a past surgical history that includes Diagnostic Cardiac Cath Lab Procedure; joint replacement (Right, 2011); joint replacement (Left, 2012); Skin graft (Bilateral, 1950); Cholecystectomy (2007); Tonsillectomy; eye surgery; Colon surgery (Right, 11/07/2014); Coronary angioplasty (2004); Cystoscopy (04/12/2017); Cardiac catheterization (07/24/2017); and Coronary artery bypass graft. Social History:  reports that he quit smoking about 59 years ago. His smoking use included cigarettes. He has a 10.00 pack-year smoking history. He has never used smokeless tobacco. He reports current alcohol use. He reports that he does not use drugs. Family History: family history includes Heart Disease in his father. . Unless otherwise noted, family history is non contributory    The patients home medications have been reviewed. Allergies: Patient has no known allergies. ---------------------------------------------------PHYSICAL EXAM--------------------------------------    Constitutional/General: Alert and oriented x3  Head: Normocephalic and atraumatic  Eyes: PERRL, EOMI, sclera non icteric  Mouth: Oropharynx clear, handling secretions, no trismus, no asymmetry of the posterior oropharynx or uvular edema  Neck: Supple, full ROM, no stridor, no meningeal signs  Respiratory: Lungs clear to auscultation bilaterally, no wheezes, rales, or rhonchi. Not in respiratory distress  Cardiovascular:  Regular rate. Regular rhythm. No murmurs, no aortic murmurs, no gallops, or rubs. 2+ distal pulses. Equal extremity pulses. Chest: No chest wall tenderness  GI:  Abdomen Soft, Non tender, Non distended.  No rebound, guarding, or rigidity. No pulsatile masses. Musculoskeletal: Moves all extremities x 4. Warm and well perfused, no clubbing, cyanosis, or edema. Capillary refill <3 seconds  Integument: skin warm and dry. No rashes. Neurologic: GCS 15, no focal deficits, symmetric strength 5/5 in the upper and lower extremities bilaterally  Psychiatric: Normal Affect          -------------------------------------------------- RESULTS -------------------------------------------------  I have personally reviewed all laboratory and imaging results for this patient. Results are listed below.      LABS: (Lab results interpreted by me)  Results for orders placed or performed during the hospital encounter of 12/17/22   CBC with Auto Differential   Result Value Ref Range    WBC 6.6 4.5 - 11.5 E9/L    RBC 3.96 3.80 - 5.80 E12/L    Hemoglobin 11.7 (L) 12.5 - 16.5 g/dL    Hematocrit 36.8 (L) 37.0 - 54.0 %    MCV 92.9 80.0 - 99.9 fL    MCH 29.5 26.0 - 35.0 pg    MCHC 31.8 (L) 32.0 - 34.5 %    RDW 17.4 (H) 11.5 - 15.0 fL    Platelets 995 647 - 340 E9/L    MPV 9.2 7.0 - 12.0 fL    Neutrophils % 61.1 43.0 - 80.0 %    Immature Granulocytes % 0.3 0.0 - 5.0 %    Lymphocytes % 23.9 20.0 - 42.0 %    Monocytes % 9.3 2.0 - 12.0 %    Eosinophils % 4.6 0.0 - 6.0 %    Basophils % 0.8 0.0 - 2.0 %    Neutrophils Absolute 4.03 1.80 - 7.30 E9/L    Immature Granulocytes # 0.02 E9/L    Lymphocytes Absolute 1.57 1.50 - 4.00 E9/L    Monocytes Absolute 0.61 0.10 - 0.95 E9/L    Eosinophils Absolute 0.30 0.05 - 0.50 E9/L    Basophils Absolute 0.05 0.00 - 0.20 E9/L   CMP   Result Value Ref Range    Sodium 141 132 - 146 mmol/L    Potassium 4.3 3.5 - 5.0 mmol/L    Chloride 103 98 - 107 mmol/L    CO2 28 22 - 29 mmol/L    Anion Gap 10 7 - 16 mmol/L    Glucose 165 (H) 74 - 99 mg/dL    BUN 20 6 - 23 mg/dL    Creatinine 1.3 (H) 0.7 - 1.2 mg/dL    Est, Glom Filt Rate 53 >=60 mL/min/1.73    Calcium 9.5 8.6 - 10.2 mg/dL    Total Protein 6.9 6.4 - 8.3 g/dL    Albumin 3.5 3.5 - 5.2 g/dL    Total Bilirubin 0.9 0.0 - 1.2 mg/dL    Alkaline Phosphatase 71 40 - 129 U/L    ALT 12 0 - 40 U/L    AST 19 0 - 39 U/L   Urinalysis with Microscopic   Result Value Ref Range    Color, UA Yellow Straw/Yellow    Clarity, UA Clear Clear    Glucose, Ur >=1000 (A) Negative mg/dL    Bilirubin Urine Negative Negative    Ketones, Urine Negative Negative mg/dL    Specific Gravity, UA 1.010 1.005 - 1.030    Blood, Urine Negative Negative    pH, UA 6.5 5.0 - 9.0    Protein, UA Negative Negative mg/dL    Urobilinogen, Urine 0.2 <2.0 E.U./dL    Nitrite, Urine Negative Negative    Leukocyte Esterase, Urine Negative Negative   Troponin   Result Value Ref Range    Troponin, High Sensitivity 41 (H) 0 - 11 ng/L   Brain Natriuretic Peptide   Result Value Ref Range    Pro-BNP 2,789 (H) 0 - 450 pg/mL   Troponin   Result Value Ref Range    Troponin, High Sensitivity 39 (H) 0 - 11 ng/L   EKG 12 Lead   Result Value Ref Range    Ventricular Rate 59 BPM    Atrial Rate 52 BPM    P-R Interval 250 ms    QRS Duration 98 ms    Q-T Interval 494 ms    QTc Calculation (Bazett) 489 ms    P Axis 58 degrees    R Axis 73 degrees    T Axis 16 degrees   ,       RADIOLOGY:  Interpreted by Radiologist unless otherwise specified  CT Head W/O Contrast   Final Result   No acute intracranial abnormality. CT CSpine W/O Contrast   Final Result   No acute abnormality of the cervical spine. XR CHEST PORTABLE   Final Result   1. Cardiomegaly. There are no findings of failure or pneumonia.                EKG Interpretation  Interpreted by emergency department physician, Dr. Gay Desir    Date of EK22  Time: 1439    Rhythm: normal sinus   Rate: 60  Axis: normal  Conduction: normal  ST Segments: nonspecific changes  T Waves: no acute change    Clinical Impression: No findings suggestive of acute ischemia or injury  Comparison to prior EKG: stable as compared to patient's most recent EKG      ------------------------- NURSING NOTES AND VITALS REVIEWED ---------------------------   The nursing notes within the ED encounter and vital signs as below have been reviewed by myself  BP (!) 127/22   Pulse 82   Temp 97.4 °F (36.3 °C) (Oral)   Resp 20   Ht 6' 1\" (1.854 m)   Wt 222 lb (100.7 kg)   SpO2 94%   BMI 29.29 kg/m²     Oxygen Saturation Interpretation: Normal    The patients available past medical records and past encounters were reviewed. ------------------------------ ED COURSE/MEDICAL DECISION MAKING----------------------  Medications - No data to display        The cardiac monitor revealed sinus rhythm with a heart rate in the 80s as interpreted by me. The cardiac monitor was ordered secondary to the patient's chest pain and to monitor for patient for dysrhythmia. CPT 86990           Medical Decision Making:     I, Dr. Linda Thorpe, am the primary provider of record    59-year-old male presenting after a fall at an assisted living facility. There did not appear to be any loss of consciousness with the fall, unclear if patient had a near syncopal episode. He has recently had synchronized cardioversion for A. fib, as well as new medication Xanax. Patient arrives well-appearing and hemodynamically stable, neurologically intact. Would have concern for possible recurrence of A. fib leading to near syncope, concern for possible ongoing effects of anesthesia from his procedure yesterday, would also have concern for possible adverse effects of new Xanax. EKG shows no findings suggestive of acute ischemia or injury, unchanged from prior EKG. Troponin 41 and 39 with baseline in the 30s. With no active chest pain have low suspicion for cardiac event. No leukocytosis or anemia. Metabolic panel is within acceptable limits. CT head and cervical spine showed no acute abnormality. Chest x-ray is unremarkable. Urinalysis shows no signs of infection. Patient is eating in the ED without difficulty.   Discussion with patient and his daughter, reassuring evaluation, do have concern for his new benzodiazepine prescription causing some of his fatigue and tiredness. Will discuss with the assisted living staff to administer as needed. Patient and daughter agree to follow-up with PCP and return for any changes in condition or new symptoms. Re-Evaluations: This patient's ED course included: a personal history and physicial examination and re-evaluation prior to disposition    This patient has remained hemodynamically stable and been closely monitored during their ED course. Counseling: The emergency provider has spoken with the patient and family member daughter and discussed todays results, in addition to providing specific details for the plan of care and counseling regarding the diagnosis and prognosis. Questions are answered at this time and they are agreeable with the plan.       --------------------------------- IMPRESSION AND DISPOSITION ---------------------------------    IMPRESSION  1. Fall, initial encounter    2. Generalized weakness        DISPOSITION  Disposition: Discharge to home  Patient condition is stable        NOTE: This report was transcribed using voice recognition software.  Every effort was made to ensure accuracy; however, inadvertent computerized transcription errors may be present        Norman Celis DO  12/1934

## 2022-12-19 LAB
EKG ATRIAL RATE: 52 BPM
EKG P AXIS: 58 DEGREES
EKG P-R INTERVAL: 250 MS
EKG Q-T INTERVAL: 494 MS
EKG QRS DURATION: 98 MS
EKG QTC CALCULATION (BAZETT): 489 MS
EKG R AXIS: 73 DEGREES
EKG T AXIS: 16 DEGREES
EKG VENTRICULAR RATE: 59 BPM

## 2022-12-19 PROCEDURE — 93010 ELECTROCARDIOGRAM REPORT: CPT | Performed by: INTERNAL MEDICINE

## 2023-01-04 ENCOUNTER — OFFICE VISIT (OUTPATIENT)
Dept: CARDIOLOGY CLINIC | Age: 88
End: 2023-01-04
Payer: MEDICARE

## 2023-01-04 VITALS
WEIGHT: 230.8 LBS | SYSTOLIC BLOOD PRESSURE: 131 MMHG | DIASTOLIC BLOOD PRESSURE: 77 MMHG | HEART RATE: 63 BPM | BODY MASS INDEX: 30.59 KG/M2 | HEIGHT: 73 IN

## 2023-01-04 DIAGNOSIS — I48.19 PERSISTENT ATRIAL FIBRILLATION (HCC): ICD-10-CM

## 2023-01-04 DIAGNOSIS — I50.30 HEART FAILURE WITH PRESERVED EJECTION FRACTION, UNSPECIFIED HF CHRONICITY (HCC): ICD-10-CM

## 2023-01-04 DIAGNOSIS — I25.10 CORONARY ARTERY DISEASE INVOLVING NATIVE CORONARY ARTERY OF NATIVE HEART WITHOUT ANGINA PECTORIS: Primary | Chronic | ICD-10-CM

## 2023-01-04 PROCEDURE — 1123F ACP DISCUSS/DSCN MKR DOCD: CPT | Performed by: INTERNAL MEDICINE

## 2023-01-04 PROCEDURE — 99214 OFFICE O/P EST MOD 30 MIN: CPT | Performed by: INTERNAL MEDICINE

## 2023-01-04 PROCEDURE — 93000 ELECTROCARDIOGRAM COMPLETE: CPT | Performed by: INTERNAL MEDICINE

## 2023-01-04 RX ORDER — ALPRAZOLAM 0.25 MG/1
0.25 TABLET ORAL NIGHTLY PRN
COMMUNITY

## 2023-01-04 RX ORDER — LANOLIN ALCOHOL/MO/W.PET/CERES
CREAM (GRAM) TOPICAL
COMMUNITY
Start: 2022-12-06

## 2023-01-04 NOTE — PROGRESS NOTES
Subjective:      Patient ID: Lizy Nair is a 80 y.o. male. 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 Maze and atriclip  B. Negative cardionet 2019  C. DCCV 12/16/2022      History of DVT (deep vein thrombosis) 01/23/2017    Diabetes ([facility] 05/31/2013    Coronary artery disease involving native coronary artery of native heart 05/31/2013     Overview Note:     A. PCI 2003 - PTCA of LAD 2004 - [place]  B. ACB 7/24/2017:  LIMA-LAD,CRYOVEIN TO PL., CIRCUMFLEX WITH HIGH GRADE LESION AFTER HUGE OM1, OM2 NOT GRAFTED         Current Outpatient Medications   Medication Sig Dispense Refill    diclofenac sodium (VOLTAREN) 1 % GEL       ALPRAZolam (XANAX) 0.25 MG tablet Take 0.25 mg by mouth nightly as needed for Sleep.      apixaban (ELIQUIS) 5 MG TABS tablet Take 5 mg by mouth 2 times daily      polyethyl glycol-propyl glycol 0.4-0.3 % (SYSTANE) 0.4-0.3 % ophthalmic solution 1 drop as needed for Dry Eyes      Omega-3 Fatty Acids (FISH OIL) 1000 MG CPDR Take 1,000 mg by mouth      Dextromethorphan HBr (TUSSIN COUGH PO) Take by mouth as needed      dapagliflozin (FARXIGA) 10 MG tablet Take 10 mg by mouth daily      cefdinir (OMNICEF) 300 MG capsule Take 300 mg by mouth in the morning and 300 mg in the evening.       omeprazole (PRILOSEC) 40 MG delayed release capsule Take 40 mg by mouth Daily      Multiple Vitamins-Minerals (OCUVITE-LUTEIN) CAPS multivitamin Take 1 capsule by mouth daily      White Petrolatum-Mineral Oil (ARTIFICIAL TEARS) ointment Place into the right eye nightly      loratadine (CLARITIN) 10 MG tablet Take 10 mg by mouth daily as needed      simvastatin (ZOCOR) 80 MG tablet Take 80 mg by mouth daily      loperamide (IMODIUM) 2 MG capsule Take 2 mg by mouth 4 times daily as needed for Diarrhea      bisacodyl (DULCOLAX) 5 MG EC tablet Take 5 mg by mouth daily as needed for Constipation      aluminum & magnesium hydroxide-simethicone (MYLANTA) 400-400-40 MG/5ML SUSP Take 30 mLs by mouth every 6 hours as needed      aspirin 81 MG EC tablet Take 1 tablet by mouth daily 30 tablet 3    FIBER- MG tablet Take 3 tablets by mouth 2 times daily      SITagliptin (JANUVIA) 50 MG tablet Take 50 mg by mouth daily      folic acid-pyridoxine-cyancobalamin (FOLBIC) 2.5-25-2 MG TABS Take 1 tablet by mouth daily      REFRESH LIQUIGEL 1 % ophthalmic gel Place into the right eye 4 times daily  12    fluorometholone (FML) 0.1 % ophthalmic suspension Place 1 drop into the right eye 2 times daily      Propylene Glycol 0.6 % SOLN Apply to eye as needed      MILK OF MAGNESIA 400 MG/5ML suspension Take 30 mLs by mouth daily as needed  1    tamsulosin (FLOMAX) 0.4 MG capsule Take 0.4 mg by mouth daily       ferrous sulfate 325 (65 Fe) MG tablet Take 325 mg by mouth daily (with breakfast)      acetaminophen (TYLENOL) 325 MG tablet Take 2 tablets by mouth every 4 hours as needed for Pain 120 tablet 3    glipiZIDE (GLUCOTROL XL) 5 MG extended release tablet Take 5 mg by mouth daily      magnesium oxide (MAG-OX) 400 MG tablet Take 400 mg by mouth daily. magnesium oxide (MAG-OX) 400 (240 Mg) MG tablet       apixaban (ELIQUIS) 2.5 MG TABS tablet Take 1 tablet by mouth 2 times daily (Patient not taking: Reported on 1/4/2023) 60 tablet 1     No current facility-administered medications for this visit.         No Known Allergies    Vitals:    01/04/23 0948   BP: 131/77   Pulse: 63   Weight: 230 lb 12.8 oz (104.7 kg)   Height: 6' 1\" (1.854 m)             SUBJECTIVE: Hi Pringle presents to the office today for re-evaluation of chronic cardiac diagnoses and hfu after successful DCCV. Off beta blocker, and had Eliquis dose increased appropriately after seeing improved renal function. Daughter says his SOB is much improved. 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 Now In Assisted Living with a walker. Had normal echo and 30 day monitor at time of CVA)                Objective:   Physical Exam   Constitutional:  overweight. He is cooperative. See vitals above.  d,  Neck: No  JVD present. Cardiovascular: irregular rhythm, S1 normal, S2 normal, intact distal pulses and normal pulses. PMI is not displaced. Exam reveals no gallop. No murmur heard. Carotid bruit is not present   Pulmonary/Chest: Effort normal with basilar crackles  Abdominal: Soft. Normal appearance and bowel sounds are normal  Musculoskeletal: He exhibits no edema  Uses walker  Skin: Skin is warm and dry. No bruising  . EKG: sinus rhythm, rate 63, first degree AVB    ASSESSMENT & PLAN:    Patient Active Problem List   Diagnosis    HX of atrial fibrillation :        Hyperlipidemia LDL goal < 70: On Zocor    Hypertension:     Aortic insufficiency:     Diabetes mellitus    Single kidney    Coronary atherosclerosis of native coronary artery: s/p ACB 2017    DVT (deep venous thrombosis): on life long coumadin     Patient with recent development of SOB likely due to loss of sinus rhythm, with marked improvement post DCCV.    Rhythm control strategy to be pursued with EP assistance as choice of drug influenced by co-morbidities and bradycardia in the past.   Caution with diuretic usage due to CKD in setting of single functioning kidney    OV 3 months

## 2023-07-04 ENCOUNTER — HOSPITAL ENCOUNTER (OUTPATIENT)
Age: 88
Setting detail: OBSERVATION
Discharge: NURSING, REHAB OR LONG-TERM CARE | End: 2023-07-12
Attending: EMERGENCY MEDICINE | Admitting: FAMILY MEDICINE
Payer: MEDICARE

## 2023-07-04 ENCOUNTER — APPOINTMENT (OUTPATIENT)
Dept: GENERAL RADIOLOGY | Age: 88
End: 2023-07-04
Payer: MEDICARE

## 2023-07-04 DIAGNOSIS — R07.9 CHEST PAIN, UNSPECIFIED TYPE: Primary | ICD-10-CM

## 2023-07-04 DIAGNOSIS — I50.30 HEART FAILURE WITH PRESERVED EJECTION FRACTION, UNSPECIFIED HF CHRONICITY (HCC): ICD-10-CM

## 2023-07-04 LAB
ALBUMIN SERPL-MCNC: 3.6 G/DL (ref 3.5–5.2)
ALP SERPL-CCNC: 54 U/L (ref 40–129)
ALT SERPL-CCNC: 11 U/L (ref 0–40)
ANION GAP SERPL CALCULATED.3IONS-SCNC: 8 MMOL/L (ref 7–16)
AST SERPL-CCNC: 20 U/L (ref 0–39)
BASOPHILS # BLD: 0.05 E9/L (ref 0–0.2)
BASOPHILS NFR BLD: 0.7 % (ref 0–2)
BILIRUB SERPL-MCNC: 1 MG/DL (ref 0–1.2)
BNP BLD-MCNC: 6283 PG/ML (ref 0–450)
BUN SERPL-MCNC: 22 MG/DL (ref 6–23)
CALCIUM SERPL-MCNC: 9 MG/DL (ref 8.6–10.2)
CHLORIDE SERPL-SCNC: 101 MMOL/L (ref 98–107)
CO2 SERPL-SCNC: 28 MMOL/L (ref 22–29)
CREAT SERPL-MCNC: 1.3 MG/DL (ref 0.7–1.2)
EOSINOPHIL # BLD: 0.34 E9/L (ref 0.05–0.5)
EOSINOPHIL NFR BLD: 4.4 % (ref 0–6)
ERYTHROCYTE [DISTWIDTH] IN BLOOD BY AUTOMATED COUNT: 15.3 FL (ref 11.5–15)
GLUCOSE SERPL-MCNC: 150 MG/DL (ref 74–99)
HCT VFR BLD AUTO: 40.3 % (ref 37–54)
HGB BLD-MCNC: 12.8 G/DL (ref 12.5–16.5)
IMM GRANULOCYTES # BLD: 0.02 E9/L
IMM GRANULOCYTES NFR BLD: 0.3 % (ref 0–5)
LYMPHOCYTES # BLD: 1.74 E9/L (ref 1.5–4)
LYMPHOCYTES NFR BLD: 22.7 % (ref 20–42)
MCH RBC QN AUTO: 31.9 PG (ref 26–35)
MCHC RBC AUTO-ENTMCNC: 31.8 % (ref 32–34.5)
MCV RBC AUTO: 100.5 FL (ref 80–99.9)
MONOCYTES # BLD: 0.73 E9/L (ref 0.1–0.95)
MONOCYTES NFR BLD: 9.5 % (ref 2–12)
NEUTROPHILS # BLD: 4.78 E9/L (ref 1.8–7.3)
NEUTS SEG NFR BLD: 62.4 % (ref 43–80)
PLATELET # BLD AUTO: 164 E9/L (ref 130–450)
PMV BLD AUTO: 9.5 FL (ref 7–12)
POTASSIUM SERPL-SCNC: 4.9 MMOL/L (ref 3.5–5)
PROT SERPL-MCNC: 6.9 G/DL (ref 6.4–8.3)
RBC # BLD AUTO: 4.01 E12/L (ref 3.8–5.8)
SODIUM SERPL-SCNC: 137 MMOL/L (ref 132–146)
TROPONIN, HIGH SENSITIVITY: 61 NG/L (ref 0–11)
TROPONIN, HIGH SENSITIVITY: 64 NG/L (ref 0–11)
WBC # BLD: 7.7 E9/L (ref 4.5–11.5)

## 2023-07-04 PROCEDURE — 83880 ASSAY OF NATRIURETIC PEPTIDE: CPT

## 2023-07-04 PROCEDURE — 71045 X-RAY EXAM CHEST 1 VIEW: CPT

## 2023-07-04 PROCEDURE — 80053 COMPREHEN METABOLIC PANEL: CPT

## 2023-07-04 PROCEDURE — 85025 COMPLETE CBC W/AUTO DIFF WBC: CPT

## 2023-07-04 PROCEDURE — 99285 EMERGENCY DEPT VISIT HI MDM: CPT

## 2023-07-04 PROCEDURE — 2580000003 HC RX 258: Performed by: NURSE PRACTITIONER

## 2023-07-04 PROCEDURE — 6370000000 HC RX 637 (ALT 250 FOR IP): Performed by: NURSE PRACTITIONER

## 2023-07-04 PROCEDURE — 84484 ASSAY OF TROPONIN QUANT: CPT

## 2023-07-04 PROCEDURE — G0378 HOSPITAL OBSERVATION PER HR: HCPCS

## 2023-07-04 PROCEDURE — 93005 ELECTROCARDIOGRAM TRACING: CPT | Performed by: STUDENT IN AN ORGANIZED HEALTH CARE EDUCATION/TRAINING PROGRAM

## 2023-07-04 RX ORDER — METOPROLOL SUCCINATE 50 MG/1
50 TABLET, EXTENDED RELEASE ORAL DAILY
Status: DISCONTINUED | OUTPATIENT
Start: 2023-07-04 | End: 2023-07-07

## 2023-07-04 RX ORDER — ATORVASTATIN CALCIUM 40 MG/1
40 TABLET, FILM COATED ORAL DAILY
Status: DISCONTINUED | OUTPATIENT
Start: 2023-07-04 | End: 2023-07-12 | Stop reason: HOSPADM

## 2023-07-04 RX ORDER — CALCIUM POLYCARBOPHIL 625 MG 625 MG/1
1875 TABLET ORAL 2 TIMES DAILY
Status: DISCONTINUED | OUTPATIENT
Start: 2023-07-04 | End: 2023-07-12 | Stop reason: HOSPADM

## 2023-07-04 RX ORDER — SODIUM CHLORIDE 0.9 % (FLUSH) 0.9 %
5-40 SYRINGE (ML) INJECTION EVERY 12 HOURS SCHEDULED
Status: DISCONTINUED | OUTPATIENT
Start: 2023-07-04 | End: 2023-07-12 | Stop reason: HOSPADM

## 2023-07-04 RX ORDER — SIMVASTATIN 40 MG
80 TABLET ORAL DAILY
Status: DISCONTINUED | OUTPATIENT
Start: 2023-07-04 | End: 2023-07-04 | Stop reason: ALTCHOICE

## 2023-07-04 RX ORDER — ACETAMINOPHEN 650 MG/1
650 SUPPOSITORY RECTAL EVERY 6 HOURS PRN
Status: DISCONTINUED | OUTPATIENT
Start: 2023-07-04 | End: 2023-07-12 | Stop reason: HOSPADM

## 2023-07-04 RX ORDER — ACETAMINOPHEN 325 MG/1
650 TABLET ORAL EVERY 6 HOURS PRN
Status: DISCONTINUED | OUTPATIENT
Start: 2023-07-04 | End: 2023-07-12 | Stop reason: HOSPADM

## 2023-07-04 RX ORDER — ALOGLIPTIN 12.5 MG/1
12.5 TABLET, FILM COATED ORAL DAILY
Status: DISCONTINUED | OUTPATIENT
Start: 2023-07-05 | End: 2023-07-12 | Stop reason: HOSPADM

## 2023-07-04 RX ORDER — SODIUM CHLORIDE 0.9 % (FLUSH) 0.9 %
10 SYRINGE (ML) INJECTION PRN
Status: DISCONTINUED | OUTPATIENT
Start: 2023-07-04 | End: 2023-07-12 | Stop reason: HOSPADM

## 2023-07-04 RX ORDER — SODIUM CHLORIDE 9 MG/ML
INJECTION, SOLUTION INTRAVENOUS PRN
Status: DISCONTINUED | OUTPATIENT
Start: 2023-07-04 | End: 2023-07-12 | Stop reason: HOSPADM

## 2023-07-04 RX ORDER — ONDANSETRON 2 MG/ML
4 INJECTION INTRAMUSCULAR; INTRAVENOUS EVERY 6 HOURS PRN
Status: DISCONTINUED | OUTPATIENT
Start: 2023-07-04 | End: 2023-07-12 | Stop reason: HOSPADM

## 2023-07-04 RX ORDER — ONDANSETRON 4 MG/1
4 TABLET, ORALLY DISINTEGRATING ORAL EVERY 8 HOURS PRN
Status: DISCONTINUED | OUTPATIENT
Start: 2023-07-04 | End: 2023-07-12 | Stop reason: HOSPADM

## 2023-07-04 RX ORDER — ASPIRIN 81 MG/1
81 TABLET ORAL DAILY
Status: DISCONTINUED | OUTPATIENT
Start: 2023-07-04 | End: 2023-07-12 | Stop reason: HOSPADM

## 2023-07-04 RX ORDER — FERROUS SULFATE 325(65) MG
325 TABLET ORAL
Status: DISCONTINUED | OUTPATIENT
Start: 2023-07-05 | End: 2023-07-12 | Stop reason: HOSPADM

## 2023-07-04 RX ORDER — LANOLIN ALCOHOL/MO/W.PET/CERES
400 CREAM (GRAM) TOPICAL DAILY
Status: DISCONTINUED | OUTPATIENT
Start: 2023-07-04 | End: 2023-07-12 | Stop reason: HOSPADM

## 2023-07-04 RX ADMIN — APIXABAN 5 MG: 5 TABLET, FILM COATED ORAL at 21:38

## 2023-07-04 RX ADMIN — ATORVASTATIN CALCIUM 40 MG: 40 TABLET, FILM COATED ORAL at 18:21

## 2023-07-04 RX ADMIN — METOPROLOL SUCCINATE 50 MG: 50 TABLET, EXTENDED RELEASE ORAL at 18:22

## 2023-07-04 RX ADMIN — Medication 10 ML: at 21:38

## 2023-07-04 RX ADMIN — Medication 400 MG: at 18:21

## 2023-07-04 RX ADMIN — ASPIRIN 81 MG: 81 TABLET, COATED ORAL at 18:21

## 2023-07-04 RX ADMIN — CALCIUM POLYCARBOPHIL 1875 MG: 625 TABLET, FILM COATED ORAL at 21:38

## 2023-07-04 ASSESSMENT — LIFESTYLE VARIABLES
HOW MANY STANDARD DRINKS CONTAINING ALCOHOL DO YOU HAVE ON A TYPICAL DAY: PATIENT DOES NOT DRINK
HOW OFTEN DO YOU HAVE A DRINK CONTAINING ALCOHOL: NEVER

## 2023-07-05 LAB
ANION GAP SERPL CALCULATED.3IONS-SCNC: 8 MMOL/L (ref 7–16)
BUN SERPL-MCNC: 20 MG/DL (ref 6–23)
CALCIUM SERPL-MCNC: 9.5 MG/DL (ref 8.6–10.2)
CHLORIDE SERPL-SCNC: 103 MMOL/L (ref 98–107)
CHOLESTEROL, TOTAL: 96 MG/DL (ref 0–199)
CO2 SERPL-SCNC: 26 MMOL/L (ref 22–29)
CREAT SERPL-MCNC: 1.2 MG/DL (ref 0.7–1.2)
EKG ATRIAL RATE: 60 BPM
EKG Q-T INTERVAL: 434 MS
EKG QRS DURATION: 98 MS
EKG QTC CALCULATION (BAZETT): 458 MS
EKG R AXIS: 27 DEGREES
EKG T AXIS: 29 DEGREES
EKG VENTRICULAR RATE: 67 BPM
ERYTHROCYTE [DISTWIDTH] IN BLOOD BY AUTOMATED COUNT: 15.1 FL (ref 11.5–15)
GLUCOSE SERPL-MCNC: 138 MG/DL (ref 74–99)
HBA1C MFR BLD: 7.7 % (ref 4–5.6)
HCT VFR BLD AUTO: 40.6 % (ref 37–54)
HDLC SERPL-MCNC: 39 MG/DL
HGB BLD-MCNC: 13.4 G/DL (ref 12.5–16.5)
LDLC SERPL CALC-MCNC: 31 MG/DL (ref 0–99)
LEFT VENTRICULAR EJECTION FRACTION HIGH VALUE: 40 %
LEFT VENTRICULAR EJECTION FRACTION HIGH VALUE: 40 %
LEFT VENTRICULAR EJECTION FRACTION MODE: NORMAL
LEFT VENTRICULAR EJECTION FRACTION MODE: NORMAL
LV EF: 35 %
LV EF: 35 %
LV EF: 38 %
LVEF MODALITY: NORMAL
MCH RBC QN AUTO: 31.5 PG (ref 26–35)
MCHC RBC AUTO-ENTMCNC: 33 % (ref 32–34.5)
MCV RBC AUTO: 95.3 FL (ref 80–99.9)
PLATELET # BLD AUTO: 177 E9/L (ref 130–450)
PMV BLD AUTO: 9.6 FL (ref 7–12)
POTASSIUM SERPL-SCNC: 4.4 MMOL/L (ref 3.5–5)
RBC # BLD AUTO: 4.26 E12/L (ref 3.8–5.8)
SODIUM SERPL-SCNC: 137 MMOL/L (ref 132–146)
TRIGL SERPL-MCNC: 129 MG/DL (ref 0–149)
VLDLC SERPL CALC-MCNC: 26 MG/DL
WBC # BLD: 7.4 E9/L (ref 4.5–11.5)

## 2023-07-05 PROCEDURE — 99222 1ST HOSP IP/OBS MODERATE 55: CPT | Performed by: INTERNAL MEDICINE

## 2023-07-05 PROCEDURE — 93010 ELECTROCARDIOGRAM REPORT: CPT | Performed by: INTERNAL MEDICINE

## 2023-07-05 PROCEDURE — 6360000002 HC RX W HCPCS: Performed by: FAMILY MEDICINE

## 2023-07-05 PROCEDURE — 36415 COLL VENOUS BLD VENIPUNCTURE: CPT

## 2023-07-05 PROCEDURE — APPSS60 APP SPLIT SHARED TIME 46-60 MINUTES: Performed by: NURSE PRACTITIONER

## 2023-07-05 PROCEDURE — 6370000000 HC RX 637 (ALT 250 FOR IP): Performed by: NURSE PRACTITIONER

## 2023-07-05 PROCEDURE — 6360000002 HC RX W HCPCS: Performed by: INTERNAL MEDICINE

## 2023-07-05 PROCEDURE — G0378 HOSPITAL OBSERVATION PER HR: HCPCS

## 2023-07-05 PROCEDURE — 85027 COMPLETE CBC AUTOMATED: CPT

## 2023-07-05 PROCEDURE — 96375 TX/PRO/DX INJ NEW DRUG ADDON: CPT

## 2023-07-05 PROCEDURE — 6360000004 HC RX CONTRAST MEDICATION: Performed by: NURSE PRACTITIONER

## 2023-07-05 PROCEDURE — 83036 HEMOGLOBIN GLYCOSYLATED A1C: CPT

## 2023-07-05 PROCEDURE — 80061 LIPID PANEL: CPT

## 2023-07-05 PROCEDURE — 96376 TX/PRO/DX INJ SAME DRUG ADON: CPT

## 2023-07-05 PROCEDURE — 93306 TTE W/DOPPLER COMPLETE: CPT

## 2023-07-05 PROCEDURE — 2580000003 HC RX 258: Performed by: NURSE PRACTITIONER

## 2023-07-05 PROCEDURE — 80048 BASIC METABOLIC PNL TOTAL CA: CPT

## 2023-07-05 RX ORDER — FUROSEMIDE 10 MG/ML
20 INJECTION INTRAMUSCULAR; INTRAVENOUS ONCE
Status: COMPLETED | OUTPATIENT
Start: 2023-07-05 | End: 2023-07-05

## 2023-07-05 RX ORDER — FUROSEMIDE 10 MG/ML
20 INJECTION INTRAMUSCULAR; INTRAVENOUS 2 TIMES DAILY
Status: DISCONTINUED | OUTPATIENT
Start: 2023-07-05 | End: 2023-07-12 | Stop reason: HOSPADM

## 2023-07-05 RX ORDER — REGADENOSON 0.08 MG/ML
0.4 INJECTION, SOLUTION INTRAVENOUS
Status: COMPLETED | OUTPATIENT
Start: 2023-07-05 | End: 2023-07-06

## 2023-07-05 RX ADMIN — Medication 10 ML: at 21:03

## 2023-07-05 RX ADMIN — CALCIUM POLYCARBOPHIL 1875 MG: 625 TABLET, FILM COATED ORAL at 21:03

## 2023-07-05 RX ADMIN — ASPIRIN 81 MG: 81 TABLET, COATED ORAL at 08:12

## 2023-07-05 RX ADMIN — FUROSEMIDE 20 MG: 10 INJECTION, SOLUTION INTRAMUSCULAR; INTRAVENOUS at 18:48

## 2023-07-05 RX ADMIN — CALCIUM POLYCARBOPHIL 1875 MG: 625 TABLET, FILM COATED ORAL at 08:13

## 2023-07-05 RX ADMIN — METOPROLOL SUCCINATE 50 MG: 50 TABLET, EXTENDED RELEASE ORAL at 08:12

## 2023-07-05 RX ADMIN — APIXABAN 5 MG: 5 TABLET, FILM COATED ORAL at 08:12

## 2023-07-05 RX ADMIN — APIXABAN 5 MG: 5 TABLET, FILM COATED ORAL at 21:03

## 2023-07-05 RX ADMIN — ALOGLIPTIN 12.5 MG: 12.5 TABLET, FILM COATED ORAL at 08:12

## 2023-07-05 RX ADMIN — PERFLUTREN 1.5 ML: 6.52 INJECTION, SUSPENSION INTRAVENOUS at 14:13

## 2023-07-05 RX ADMIN — Medication 400 MG: at 08:12

## 2023-07-05 RX ADMIN — FUROSEMIDE 20 MG: 10 INJECTION, SOLUTION INTRAMUSCULAR; INTRAVENOUS at 08:22

## 2023-07-05 RX ADMIN — Medication 10 ML: at 08:26

## 2023-07-05 RX ADMIN — FUROSEMIDE 20 MG: 10 INJECTION, SOLUTION INTRAMUSCULAR; INTRAVENOUS at 15:20

## 2023-07-05 RX ADMIN — FERROUS SULFATE TAB 325 MG (65 MG ELEMENTAL FE) 325 MG: 325 (65 FE) TAB at 08:12

## 2023-07-05 RX ADMIN — ATORVASTATIN CALCIUM 40 MG: 40 TABLET, FILM COATED ORAL at 18:45

## 2023-07-06 ENCOUNTER — APPOINTMENT (OUTPATIENT)
Dept: GENERAL RADIOLOGY | Age: 88
End: 2023-07-06
Payer: MEDICARE

## 2023-07-06 ENCOUNTER — APPOINTMENT (OUTPATIENT)
Dept: NON INVASIVE DIAGNOSTICS | Age: 88
End: 2023-07-06
Payer: MEDICARE

## 2023-07-06 ENCOUNTER — APPOINTMENT (OUTPATIENT)
Dept: NUCLEAR MEDICINE | Age: 88
End: 2023-07-06
Payer: MEDICARE

## 2023-07-06 LAB
ANION GAP SERPL CALCULATED.3IONS-SCNC: 13 MMOL/L (ref 7–16)
BNP BLD-MCNC: 8025 PG/ML (ref 0–450)
BUN SERPL-MCNC: 22 MG/DL (ref 6–23)
CALCIUM SERPL-MCNC: 9.6 MG/DL (ref 8.6–10.2)
CHLORIDE SERPL-SCNC: 96 MMOL/L (ref 98–107)
CO2 SERPL-SCNC: 25 MMOL/L (ref 22–29)
CREAT SERPL-MCNC: 1.3 MG/DL (ref 0.7–1.2)
ERYTHROCYTE [DISTWIDTH] IN BLOOD BY AUTOMATED COUNT: 15 FL (ref 11.5–15)
GLUCOSE SERPL-MCNC: 157 MG/DL (ref 74–99)
HCT VFR BLD AUTO: 42.8 % (ref 37–54)
HGB BLD-MCNC: 14.3 G/DL (ref 12.5–16.5)
LV EF: 39 %
LVEF MODALITY: NORMAL
MCH RBC QN AUTO: 31.3 PG (ref 26–35)
MCHC RBC AUTO-ENTMCNC: 33.4 % (ref 32–34.5)
MCV RBC AUTO: 93.7 FL (ref 80–99.9)
PLATELET # BLD AUTO: 178 E9/L (ref 130–450)
PMV BLD AUTO: 10 FL (ref 7–12)
POTASSIUM SERPL-SCNC: 3.9 MMOL/L (ref 3.5–5)
RBC # BLD AUTO: 4.57 E12/L (ref 3.8–5.8)
SODIUM SERPL-SCNC: 134 MMOL/L (ref 132–146)
WBC # BLD: 7.8 E9/L (ref 4.5–11.5)

## 2023-07-06 PROCEDURE — 6360000002 HC RX W HCPCS: Performed by: INTERNAL MEDICINE

## 2023-07-06 PROCEDURE — G0378 HOSPITAL OBSERVATION PER HR: HCPCS

## 2023-07-06 PROCEDURE — A9500 TC99M SESTAMIBI: HCPCS | Performed by: RADIOLOGY

## 2023-07-06 PROCEDURE — 71045 X-RAY EXAM CHEST 1 VIEW: CPT

## 2023-07-06 PROCEDURE — 6370000000 HC RX 637 (ALT 250 FOR IP): Performed by: NURSE PRACTITIONER

## 2023-07-06 PROCEDURE — 93018 CV STRESS TEST I&R ONLY: CPT | Performed by: INTERNAL MEDICINE

## 2023-07-06 PROCEDURE — 36415 COLL VENOUS BLD VENIPUNCTURE: CPT

## 2023-07-06 PROCEDURE — 93017 CV STRESS TEST TRACING ONLY: CPT

## 2023-07-06 PROCEDURE — 80048 BASIC METABOLIC PNL TOTAL CA: CPT

## 2023-07-06 PROCEDURE — 78452 HT MUSCLE IMAGE SPECT MULT: CPT

## 2023-07-06 PROCEDURE — 96376 TX/PRO/DX INJ SAME DRUG ADON: CPT

## 2023-07-06 PROCEDURE — 3430000000 HC RX DIAGNOSTIC RADIOPHARMACEUTICAL: Performed by: RADIOLOGY

## 2023-07-06 PROCEDURE — 2580000003 HC RX 258: Performed by: NURSE PRACTITIONER

## 2023-07-06 PROCEDURE — 99233 SBSQ HOSP IP/OBS HIGH 50: CPT | Performed by: INTERNAL MEDICINE

## 2023-07-06 PROCEDURE — 85027 COMPLETE CBC AUTOMATED: CPT

## 2023-07-06 PROCEDURE — 93016 CV STRESS TEST SUPVJ ONLY: CPT | Performed by: INTERNAL MEDICINE

## 2023-07-06 PROCEDURE — 2700000000 HC OXYGEN THERAPY PER DAY

## 2023-07-06 PROCEDURE — 83880 ASSAY OF NATRIURETIC PEPTIDE: CPT

## 2023-07-06 PROCEDURE — 78452 HT MUSCLE IMAGE SPECT MULT: CPT | Performed by: INTERNAL MEDICINE

## 2023-07-06 RX ORDER — TETRAKIS(2-METHOXYISOBUTYLISOCYANIDE)COPPER(I) TETRAFLUOROBORATE 1 MG/ML
10 INJECTION, POWDER, LYOPHILIZED, FOR SOLUTION INTRAVENOUS
Status: COMPLETED | OUTPATIENT
Start: 2023-07-06 | End: 2023-07-06

## 2023-07-06 RX ORDER — TETRAKIS(2-METHOXYISOBUTYLISOCYANIDE)COPPER(I) TETRAFLUOROBORATE 1 MG/ML
30 INJECTION, POWDER, LYOPHILIZED, FOR SOLUTION INTRAVENOUS
Status: COMPLETED | OUTPATIENT
Start: 2023-07-06 | End: 2023-07-06

## 2023-07-06 RX ADMIN — Medication 400 MG: at 11:54

## 2023-07-06 RX ADMIN — Medication 30 MILLICURIE: at 08:03

## 2023-07-06 RX ADMIN — FUROSEMIDE 20 MG: 10 INJECTION, SOLUTION INTRAMUSCULAR; INTRAVENOUS at 11:55

## 2023-07-06 RX ADMIN — REGADENOSON 0.4 MG: 0.08 INJECTION, SOLUTION INTRAVENOUS at 10:05

## 2023-07-06 RX ADMIN — CALCIUM POLYCARBOPHIL 1875 MG: 625 TABLET, FILM COATED ORAL at 19:58

## 2023-07-06 RX ADMIN — METOPROLOL SUCCINATE 50 MG: 50 TABLET, EXTENDED RELEASE ORAL at 11:54

## 2023-07-06 RX ADMIN — Medication 10 ML: at 19:58

## 2023-07-06 RX ADMIN — APIXABAN 5 MG: 5 TABLET, FILM COATED ORAL at 19:57

## 2023-07-06 RX ADMIN — ASPIRIN 81 MG: 81 TABLET, COATED ORAL at 11:54

## 2023-07-06 RX ADMIN — ATORVASTATIN CALCIUM 40 MG: 40 TABLET, FILM COATED ORAL at 16:23

## 2023-07-06 RX ADMIN — Medication 10 MILLICURIE: at 08:03

## 2023-07-06 RX ADMIN — ACETAMINOPHEN 650 MG: 325 TABLET ORAL at 16:23

## 2023-07-06 RX ADMIN — Medication 10 ML: at 12:11

## 2023-07-06 RX ADMIN — CALCIUM POLYCARBOPHIL 1875 MG: 625 TABLET, FILM COATED ORAL at 11:53

## 2023-07-06 RX ADMIN — APIXABAN 5 MG: 5 TABLET, FILM COATED ORAL at 11:54

## 2023-07-06 RX ADMIN — FERROUS SULFATE TAB 325 MG (65 MG ELEMENTAL FE) 325 MG: 325 (65 FE) TAB at 11:54

## 2023-07-06 RX ADMIN — ALOGLIPTIN 12.5 MG: 12.5 TABLET, FILM COATED ORAL at 11:54

## 2023-07-07 LAB
ANION GAP SERPL CALCULATED.3IONS-SCNC: 14 MMOL/L (ref 7–16)
BUN SERPL-MCNC: 32 MG/DL (ref 6–23)
CALCIUM SERPL-MCNC: 9.9 MG/DL (ref 8.6–10.2)
CHLORIDE SERPL-SCNC: 95 MMOL/L (ref 98–107)
CO2 SERPL-SCNC: 26 MMOL/L (ref 22–29)
CREAT SERPL-MCNC: 1.7 MG/DL (ref 0.7–1.2)
ERYTHROCYTE [DISTWIDTH] IN BLOOD BY AUTOMATED COUNT: 15.1 FL (ref 11.5–15)
GLUCOSE SERPL-MCNC: 146 MG/DL (ref 74–99)
HCT VFR BLD AUTO: 50.4 % (ref 37–54)
HGB BLD-MCNC: 16.3 G/DL (ref 12.5–16.5)
MCH RBC QN AUTO: 31.5 PG (ref 26–35)
MCHC RBC AUTO-ENTMCNC: 32.3 % (ref 32–34.5)
MCV RBC AUTO: 97.3 FL (ref 80–99.9)
PLATELET # BLD AUTO: 191 E9/L (ref 130–450)
PMV BLD AUTO: 9.7 FL (ref 7–12)
POTASSIUM SERPL-SCNC: 4.2 MMOL/L (ref 3.5–5)
RBC # BLD AUTO: 5.18 E12/L (ref 3.8–5.8)
SODIUM SERPL-SCNC: 135 MMOL/L (ref 132–146)
WBC # BLD: 7.6 E9/L (ref 4.5–11.5)

## 2023-07-07 PROCEDURE — 6370000000 HC RX 637 (ALT 250 FOR IP): Performed by: CLINICAL NURSE SPECIALIST

## 2023-07-07 PROCEDURE — 99233 SBSQ HOSP IP/OBS HIGH 50: CPT | Performed by: INTERNAL MEDICINE

## 2023-07-07 PROCEDURE — 6370000000 HC RX 637 (ALT 250 FOR IP): Performed by: NURSE PRACTITIONER

## 2023-07-07 PROCEDURE — 36415 COLL VENOUS BLD VENIPUNCTURE: CPT

## 2023-07-07 PROCEDURE — 6370000000 HC RX 637 (ALT 250 FOR IP): Performed by: INTERNAL MEDICINE

## 2023-07-07 PROCEDURE — G0378 HOSPITAL OBSERVATION PER HR: HCPCS

## 2023-07-07 PROCEDURE — 85027 COMPLETE CBC AUTOMATED: CPT

## 2023-07-07 PROCEDURE — 2700000000 HC OXYGEN THERAPY PER DAY

## 2023-07-07 PROCEDURE — 2580000003 HC RX 258: Performed by: NURSE PRACTITIONER

## 2023-07-07 PROCEDURE — 80048 BASIC METABOLIC PNL TOTAL CA: CPT

## 2023-07-07 RX ORDER — CALCIUM CARBONATE 500 MG/1
500 TABLET, CHEWABLE ORAL EVERY 8 HOURS PRN
Status: DISCONTINUED | OUTPATIENT
Start: 2023-07-07 | End: 2023-07-12 | Stop reason: HOSPADM

## 2023-07-07 RX ORDER — HYDRALAZINE HYDROCHLORIDE 10 MG/1
10 TABLET, FILM COATED ORAL EVERY 12 HOURS SCHEDULED
Status: DISCONTINUED | OUTPATIENT
Start: 2023-07-07 | End: 2023-07-12 | Stop reason: HOSPADM

## 2023-07-07 RX ORDER — METOPROLOL SUCCINATE 25 MG/1
25 TABLET, EXTENDED RELEASE ORAL DAILY
Status: DISCONTINUED | OUTPATIENT
Start: 2023-07-08 | End: 2023-07-12 | Stop reason: HOSPADM

## 2023-07-07 RX ORDER — ISOSORBIDE MONONITRATE 30 MG/1
30 TABLET, EXTENDED RELEASE ORAL DAILY
Status: DISCONTINUED | OUTPATIENT
Start: 2023-07-07 | End: 2023-07-12 | Stop reason: HOSPADM

## 2023-07-07 RX ADMIN — APIXABAN 2.5 MG: 2.5 TABLET, FILM COATED ORAL at 10:33

## 2023-07-07 RX ADMIN — APIXABAN 2.5 MG: 2.5 TABLET, FILM COATED ORAL at 19:38

## 2023-07-07 RX ADMIN — ALOGLIPTIN 12.5 MG: 12.5 TABLET, FILM COATED ORAL at 10:33

## 2023-07-07 RX ADMIN — ASPIRIN 81 MG: 81 TABLET, COATED ORAL at 10:34

## 2023-07-07 RX ADMIN — CALCIUM POLYCARBOPHIL 1875 MG: 625 TABLET, FILM COATED ORAL at 19:39

## 2023-07-07 RX ADMIN — ATORVASTATIN CALCIUM 40 MG: 40 TABLET, FILM COATED ORAL at 17:34

## 2023-07-07 RX ADMIN — FERROUS SULFATE TAB 325 MG (65 MG ELEMENTAL FE) 325 MG: 325 (65 FE) TAB at 10:34

## 2023-07-07 RX ADMIN — Medication 400 MG: at 10:34

## 2023-07-07 RX ADMIN — METOPROLOL SUCCINATE 50 MG: 50 TABLET, EXTENDED RELEASE ORAL at 11:58

## 2023-07-07 RX ADMIN — CALCIUM POLYCARBOPHIL 1875 MG: 625 TABLET, FILM COATED ORAL at 10:33

## 2023-07-07 RX ADMIN — ANTACID TABLETS 500 MG: 500 TABLET, CHEWABLE ORAL at 20:41

## 2023-07-07 RX ADMIN — Medication 10 ML: at 19:39

## 2023-07-07 RX ADMIN — Medication 10 ML: at 10:33

## 2023-07-07 RX ADMIN — HYDRALAZINE HYDROCHLORIDE 10 MG: 10 TABLET, FILM COATED ORAL at 19:38

## 2023-07-08 LAB
ANION GAP SERPL CALCULATED.3IONS-SCNC: 11 MMOL/L (ref 7–16)
BUN SERPL-MCNC: 36 MG/DL (ref 6–23)
CALCIUM SERPL-MCNC: 9.6 MG/DL (ref 8.6–10.2)
CHLORIDE SERPL-SCNC: 95 MMOL/L (ref 98–107)
CO2 SERPL-SCNC: 26 MMOL/L (ref 22–29)
CREAT SERPL-MCNC: 1.4 MG/DL (ref 0.7–1.2)
ERYTHROCYTE [DISTWIDTH] IN BLOOD BY AUTOMATED COUNT: 15 FL (ref 11.5–15)
GLUCOSE SERPL-MCNC: 151 MG/DL (ref 74–99)
HCT VFR BLD AUTO: 44.5 % (ref 37–54)
HGB BLD-MCNC: 14.5 G/DL (ref 12.5–16.5)
MCH RBC QN AUTO: 31.3 PG (ref 26–35)
MCHC RBC AUTO-ENTMCNC: 32.6 % (ref 32–34.5)
MCV RBC AUTO: 96.1 FL (ref 80–99.9)
PLATELET # BLD AUTO: 191 E9/L (ref 130–450)
PMV BLD AUTO: 9.9 FL (ref 7–12)
POTASSIUM SERPL-SCNC: 4.4 MMOL/L (ref 3.5–5)
RBC # BLD AUTO: 4.63 E12/L (ref 3.8–5.8)
SODIUM SERPL-SCNC: 132 MMOL/L (ref 132–146)
WBC # BLD: 7.8 E9/L (ref 4.5–11.5)

## 2023-07-08 PROCEDURE — 6370000000 HC RX 637 (ALT 250 FOR IP): Performed by: NURSE PRACTITIONER

## 2023-07-08 PROCEDURE — 6370000000 HC RX 637 (ALT 250 FOR IP): Performed by: INTERNAL MEDICINE

## 2023-07-08 PROCEDURE — G0378 HOSPITAL OBSERVATION PER HR: HCPCS

## 2023-07-08 PROCEDURE — 36415 COLL VENOUS BLD VENIPUNCTURE: CPT

## 2023-07-08 PROCEDURE — 85027 COMPLETE CBC AUTOMATED: CPT

## 2023-07-08 PROCEDURE — 2580000003 HC RX 258: Performed by: NURSE PRACTITIONER

## 2023-07-08 PROCEDURE — 80048 BASIC METABOLIC PNL TOTAL CA: CPT

## 2023-07-08 RX ORDER — METOPROLOL SUCCINATE 25 MG/1
25 TABLET, EXTENDED RELEASE ORAL DAILY
Qty: 30 TABLET | Refills: 3 | Status: SHIPPED | OUTPATIENT
Start: 2023-07-09

## 2023-07-08 RX ORDER — HYDRALAZINE HYDROCHLORIDE 10 MG/1
10 TABLET, FILM COATED ORAL EVERY 12 HOURS SCHEDULED
Qty: 90 TABLET | Refills: 3 | Status: SHIPPED | OUTPATIENT
Start: 2023-07-08

## 2023-07-08 RX ORDER — FUROSEMIDE 20 MG/1
20 TABLET ORAL DAILY
Qty: 60 TABLET | Refills: 3 | Status: SHIPPED | OUTPATIENT
Start: 2023-07-08

## 2023-07-08 RX ORDER — ISOSORBIDE MONONITRATE 30 MG/1
30 TABLET, EXTENDED RELEASE ORAL DAILY
Qty: 30 TABLET | Refills: 3 | Status: SHIPPED | OUTPATIENT
Start: 2023-07-09

## 2023-07-08 RX ORDER — CALCIUM CARBONATE 500 MG/1
500 TABLET, CHEWABLE ORAL ONCE
Status: COMPLETED | OUTPATIENT
Start: 2023-07-08 | End: 2023-07-08

## 2023-07-08 RX ORDER — LANOLIN ALCOHOL/MO/W.PET/CERES
400 CREAM (GRAM) TOPICAL DAILY
Qty: 30 TABLET | Refills: 1 | Status: SHIPPED | OUTPATIENT
Start: 2023-07-09

## 2023-07-08 RX ADMIN — HYDRALAZINE HYDROCHLORIDE 10 MG: 10 TABLET, FILM COATED ORAL at 09:44

## 2023-07-08 RX ADMIN — Medication 10 ML: at 20:28

## 2023-07-08 RX ADMIN — ANTACID TABLETS 500 MG: 500 TABLET, CHEWABLE ORAL at 22:06

## 2023-07-08 RX ADMIN — Medication 400 MG: at 09:45

## 2023-07-08 RX ADMIN — ANTACID TABLETS 500 MG: 500 TABLET, CHEWABLE ORAL at 20:29

## 2023-07-08 RX ADMIN — ACETAMINOPHEN 650 MG: 325 TABLET ORAL at 01:03

## 2023-07-08 RX ADMIN — APIXABAN 2.5 MG: 2.5 TABLET, FILM COATED ORAL at 09:44

## 2023-07-08 RX ADMIN — Medication 10 ML: at 09:44

## 2023-07-08 RX ADMIN — METOPROLOL SUCCINATE 25 MG: 25 TABLET, EXTENDED RELEASE ORAL at 09:44

## 2023-07-08 RX ADMIN — ALOGLIPTIN 12.5 MG: 12.5 TABLET, FILM COATED ORAL at 09:45

## 2023-07-08 RX ADMIN — ISOSORBIDE MONONITRATE 30 MG: 30 TABLET, EXTENDED RELEASE ORAL at 09:44

## 2023-07-08 RX ADMIN — APIXABAN 2.5 MG: 2.5 TABLET, FILM COATED ORAL at 20:29

## 2023-07-08 RX ADMIN — CALCIUM POLYCARBOPHIL 1875 MG: 625 TABLET, FILM COATED ORAL at 20:29

## 2023-07-08 RX ADMIN — CALCIUM POLYCARBOPHIL 1875 MG: 625 TABLET, FILM COATED ORAL at 09:45

## 2023-07-08 RX ADMIN — ATORVASTATIN CALCIUM 40 MG: 40 TABLET, FILM COATED ORAL at 17:10

## 2023-07-08 RX ADMIN — FERROUS SULFATE TAB 325 MG (65 MG ELEMENTAL FE) 325 MG: 325 (65 FE) TAB at 09:45

## 2023-07-08 ASSESSMENT — PAIN SCALES - GENERAL
PAINLEVEL_OUTOF10: 0
PAINLEVEL_OUTOF10: 2
PAINLEVEL_OUTOF10: 0
PAINLEVEL_OUTOF10: 0

## 2023-07-09 LAB
ANION GAP SERPL CALCULATED.3IONS-SCNC: 11 MMOL/L (ref 7–16)
BUN SERPL-MCNC: 42 MG/DL (ref 6–23)
CALCIUM SERPL-MCNC: 9.8 MG/DL (ref 8.6–10.2)
CHLORIDE SERPL-SCNC: 97 MMOL/L (ref 98–107)
CO2 SERPL-SCNC: 27 MMOL/L (ref 22–29)
CREAT SERPL-MCNC: 1.5 MG/DL (ref 0.7–1.2)
ERYTHROCYTE [DISTWIDTH] IN BLOOD BY AUTOMATED COUNT: 15 FL (ref 11.5–15)
GLUCOSE SERPL-MCNC: 178 MG/DL (ref 74–99)
HCT VFR BLD AUTO: 43.7 % (ref 37–54)
HGB BLD-MCNC: 14 G/DL (ref 12.5–16.5)
MCH RBC QN AUTO: 31.7 PG (ref 26–35)
MCHC RBC AUTO-ENTMCNC: 32 % (ref 32–34.5)
MCV RBC AUTO: 99.1 FL (ref 80–99.9)
PLATELET # BLD AUTO: 182 E9/L (ref 130–450)
PMV BLD AUTO: 10.1 FL (ref 7–12)
POTASSIUM SERPL-SCNC: 4.8 MMOL/L (ref 3.5–5)
RBC # BLD AUTO: 4.41 E12/L (ref 3.8–5.8)
SODIUM SERPL-SCNC: 135 MMOL/L (ref 132–146)
WBC # BLD: 10.1 E9/L (ref 4.5–11.5)

## 2023-07-09 PROCEDURE — 36415 COLL VENOUS BLD VENIPUNCTURE: CPT

## 2023-07-09 PROCEDURE — G0378 HOSPITAL OBSERVATION PER HR: HCPCS

## 2023-07-09 PROCEDURE — 85027 COMPLETE CBC AUTOMATED: CPT

## 2023-07-09 PROCEDURE — 2580000003 HC RX 258: Performed by: NURSE PRACTITIONER

## 2023-07-09 PROCEDURE — 6370000000 HC RX 637 (ALT 250 FOR IP): Performed by: NURSE PRACTITIONER

## 2023-07-09 PROCEDURE — 80048 BASIC METABOLIC PNL TOTAL CA: CPT

## 2023-07-09 PROCEDURE — 6370000000 HC RX 637 (ALT 250 FOR IP): Performed by: INTERNAL MEDICINE

## 2023-07-09 RX ADMIN — ALOGLIPTIN 12.5 MG: 12.5 TABLET, FILM COATED ORAL at 08:16

## 2023-07-09 RX ADMIN — APIXABAN 2.5 MG: 2.5 TABLET, FILM COATED ORAL at 08:16

## 2023-07-09 RX ADMIN — FERROUS SULFATE TAB 325 MG (65 MG ELEMENTAL FE) 325 MG: 325 (65 FE) TAB at 08:16

## 2023-07-09 RX ADMIN — ATORVASTATIN CALCIUM 40 MG: 40 TABLET, FILM COATED ORAL at 16:56

## 2023-07-09 RX ADMIN — ISOSORBIDE MONONITRATE 30 MG: 30 TABLET, EXTENDED RELEASE ORAL at 08:15

## 2023-07-09 RX ADMIN — Medication 10 ML: at 20:08

## 2023-07-09 RX ADMIN — CALCIUM POLYCARBOPHIL 1875 MG: 625 TABLET, FILM COATED ORAL at 20:08

## 2023-07-09 RX ADMIN — CALCIUM POLYCARBOPHIL 1875 MG: 625 TABLET, FILM COATED ORAL at 08:16

## 2023-07-09 RX ADMIN — Medication 10 ML: at 08:17

## 2023-07-09 RX ADMIN — APIXABAN 2.5 MG: 2.5 TABLET, FILM COATED ORAL at 20:08

## 2023-07-09 RX ADMIN — Medication 400 MG: at 08:16

## 2023-07-09 RX ADMIN — HYDRALAZINE HYDROCHLORIDE 10 MG: 10 TABLET, FILM COATED ORAL at 20:08

## 2023-07-09 ASSESSMENT — PAIN DESCRIPTION - LOCATION: LOCATION: GENERALIZED

## 2023-07-09 ASSESSMENT — PAIN SCALES - GENERAL
PAINLEVEL_OUTOF10: 2
PAINLEVEL_OUTOF10: 0

## 2023-07-10 LAB
ANION GAP SERPL CALCULATED.3IONS-SCNC: 11 MMOL/L (ref 7–16)
BUN SERPL-MCNC: 38 MG/DL (ref 6–23)
CALCIUM SERPL-MCNC: 9.6 MG/DL (ref 8.6–10.2)
CHLORIDE SERPL-SCNC: 97 MMOL/L (ref 98–107)
CO2 SERPL-SCNC: 26 MMOL/L (ref 22–29)
CREAT SERPL-MCNC: 1.4 MG/DL (ref 0.7–1.2)
ERYTHROCYTE [DISTWIDTH] IN BLOOD BY AUTOMATED COUNT: 14.9 FL (ref 11.5–15)
GLUCOSE SERPL-MCNC: 163 MG/DL (ref 74–99)
HCT VFR BLD AUTO: 43.4 % (ref 37–54)
HGB BLD-MCNC: 14 G/DL (ref 12.5–16.5)
MCH RBC QN AUTO: 31.5 PG (ref 26–35)
MCHC RBC AUTO-ENTMCNC: 32.3 % (ref 32–34.5)
MCV RBC AUTO: 97.5 FL (ref 80–99.9)
PLATELET # BLD AUTO: 184 E9/L (ref 130–450)
PMV BLD AUTO: 10.1 FL (ref 7–12)
POTASSIUM SERPL-SCNC: 4.8 MMOL/L (ref 3.5–5)
RBC # BLD AUTO: 4.45 E12/L (ref 3.8–5.8)
SODIUM SERPL-SCNC: 134 MMOL/L (ref 132–146)
WBC # BLD: 8.3 E9/L (ref 4.5–11.5)

## 2023-07-10 PROCEDURE — 97161 PT EVAL LOW COMPLEX 20 MIN: CPT

## 2023-07-10 PROCEDURE — 97165 OT EVAL LOW COMPLEX 30 MIN: CPT

## 2023-07-10 PROCEDURE — 85027 COMPLETE CBC AUTOMATED: CPT

## 2023-07-10 PROCEDURE — 6370000000 HC RX 637 (ALT 250 FOR IP): Performed by: INTERNAL MEDICINE

## 2023-07-10 PROCEDURE — 6370000000 HC RX 637 (ALT 250 FOR IP): Performed by: NURSE PRACTITIONER

## 2023-07-10 PROCEDURE — G0378 HOSPITAL OBSERVATION PER HR: HCPCS

## 2023-07-10 PROCEDURE — 80048 BASIC METABOLIC PNL TOTAL CA: CPT

## 2023-07-10 PROCEDURE — 2580000003 HC RX 258: Performed by: NURSE PRACTITIONER

## 2023-07-10 PROCEDURE — 36415 COLL VENOUS BLD VENIPUNCTURE: CPT

## 2023-07-10 RX ADMIN — ISOSORBIDE MONONITRATE 30 MG: 30 TABLET, EXTENDED RELEASE ORAL at 08:56

## 2023-07-10 RX ADMIN — CALCIUM POLYCARBOPHIL 1875 MG: 625 TABLET, FILM COATED ORAL at 08:54

## 2023-07-10 RX ADMIN — Medication 10 ML: at 08:56

## 2023-07-10 RX ADMIN — HYDRALAZINE HYDROCHLORIDE 10 MG: 10 TABLET, FILM COATED ORAL at 20:32

## 2023-07-10 RX ADMIN — CALCIUM POLYCARBOPHIL 1875 MG: 625 TABLET, FILM COATED ORAL at 20:32

## 2023-07-10 RX ADMIN — APIXABAN 2.5 MG: 2.5 TABLET, FILM COATED ORAL at 20:32

## 2023-07-10 RX ADMIN — FERROUS SULFATE TAB 325 MG (65 MG ELEMENTAL FE) 325 MG: 325 (65 FE) TAB at 08:54

## 2023-07-10 RX ADMIN — HYDRALAZINE HYDROCHLORIDE 10 MG: 10 TABLET, FILM COATED ORAL at 08:56

## 2023-07-10 RX ADMIN — APIXABAN 2.5 MG: 2.5 TABLET, FILM COATED ORAL at 08:54

## 2023-07-10 RX ADMIN — METOPROLOL SUCCINATE 25 MG: 25 TABLET, EXTENDED RELEASE ORAL at 08:56

## 2023-07-10 RX ADMIN — Medication 400 MG: at 08:54

## 2023-07-10 RX ADMIN — Medication 10 ML: at 23:40

## 2023-07-10 RX ADMIN — ATORVASTATIN CALCIUM 40 MG: 40 TABLET, FILM COATED ORAL at 17:39

## 2023-07-10 RX ADMIN — ALOGLIPTIN 12.5 MG: 12.5 TABLET, FILM COATED ORAL at 08:54

## 2023-07-10 ASSESSMENT — PAIN SCALES - GENERAL
PAINLEVEL_OUTOF10: 0
PAINLEVEL_OUTOF10: 0

## 2023-07-10 NOTE — PROGRESS NOTES
Received a phone call from patients daughter, who is concerned that he is going to be discharged to rehab with  Aortic root dilation measuring 5 cm, and if cardiology approved him to do exercise at rehab.     The above was sent in a secure message to [facility]

## 2023-07-10 NOTE — PROGRESS NOTES
Physical Therapy  Facility/Department: [address] INTERMEDIATE 1  Physical Therapy Initial Assessment    Name: Iglesia العراقي  : 1934  MRN: [id number]  Date of Service: 7/10/2023      Patient Diagnosis(es): The primary encounter diagnosis was Chest pain, unspecified type. A diagnosis of Heart failure with preserved ejection fraction, unspecified HF chronicity ([address]) was also pertinent to this visit. Past Medical History:  has a past medical history of A-fib ([address]), Acquired absence of kidney, Anemia, BPH (benign prostatic hyperplasia), Brainstem stroke ([address]), Colon cancer ([address]), Congenital absence of one kidney, DM (diabetes mellitus), type 2 ([address]), Dysphagia, GERD (gastroesophageal reflux disease), Hyperlipidemia, Hypertension, Overactive bladder, PE (pulmonary thromboembolism) ([address]), SSS (sick sinus syndrome) ([address]), and Vertebral artery occlusion, right. Past Surgical History:  has a past surgical history that includes Diagnostic Cardiac Cath Lab Procedure; joint replacement (Right, ); joint replacement (Left, ); Skin graft (Bilateral, ); Cholecystectomy (); Tonsillectomy; eye surgery; Colon surgery (Right, 2014); Coronary angioplasty (); Cystoscopy (2017); Cardiac catheterization (2017); and Coronary artery bypass graft. Evaluating Therapist: Yandy Wilkins PT    Room #:  6957/8191-H  Diagnosis:  Chest pain [R07.9]  Chest pain, unspecified type [R07.9]  PMHx/PSHx:  CVA, DM  Precautions:  falls, alarm    Social:  Pt admitted from [facility]. Ambulates with rollator. States he has a very long distance to walk to dining room. Family reports recent fall in the past 2 weeks where he missed the chair going from be to chair     Initial Evaluation  Date: 7/10/23 Treatment      Short Term/ Long Term   Goals   Was pt agreeable to Eval/treatment? yes     Does pt have pain?  No specific c/o pain     Bed Mobility  Rolling: NT  Supine to sit: NT  Sit to supine: NT  Scooting: NT  independent

## 2023-07-10 NOTE — CARE COORDINATION
CASE MANAGEMENT. ... Discharge order on chart. Met with patient and daughter at the bedside to confirm dc plans. Mr Erica Locke is now interested in [place]. Is requesting [facility]. Rehanad Mirna Meade to inquire about bed availability and give referral. Await input. PT/OT on board-requested for patient to be seen. If no [place], patient will return to [facility] at [address] [facility]. Briseyda Davis notified of the above.  Will follow

## 2023-07-10 NOTE — PROGRESS NOTES
After receiving a message from [employer] to notify cardiology to explain to the family,  message was sent to Dr Elliott Cousins regarding their concerns.

## 2023-07-10 NOTE — CARE COORDINATION
CASE MANAGEMENT. ... PT 16/OT pending. Referral given to [facility] with [facility]. She will review patient info. Will need precert. In anticipation that patient will go to [facility] in [place]. Lette form in chart. SS to complete NANCY. Ryan JAKOB will follow at the facility.
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
## 2023-07-11 LAB
ANION GAP SERPL CALCULATED.3IONS-SCNC: 10 MMOL/L (ref 7–16)
BUN SERPL-MCNC: 34 MG/DL (ref 6–23)
CALCIUM SERPL-MCNC: 9.5 MG/DL (ref 8.6–10.2)
CHLORIDE SERPL-SCNC: 98 MMOL/L (ref 98–107)
CO2 SERPL-SCNC: 27 MMOL/L (ref 22–29)
CREAT SERPL-MCNC: 1.3 MG/DL (ref 0.7–1.2)
ERYTHROCYTE [DISTWIDTH] IN BLOOD BY AUTOMATED COUNT: 14.6 FL (ref 11.5–15)
GLUCOSE SERPL-MCNC: 156 MG/DL (ref 74–99)
HCT VFR BLD AUTO: 42 % (ref 37–54)
HGB BLD-MCNC: 13.5 G/DL (ref 12.5–16.5)
MCH RBC QN AUTO: 31.1 PG (ref 26–35)
MCHC RBC AUTO-ENTMCNC: 32.1 % (ref 32–34.5)
MCV RBC AUTO: 96.8 FL (ref 80–99.9)
PLATELET # BLD AUTO: 175 E9/L (ref 130–450)
PMV BLD AUTO: 9.8 FL (ref 7–12)
POTASSIUM SERPL-SCNC: 4.8 MMOL/L (ref 3.5–5)
RBC # BLD AUTO: 4.34 E12/L (ref 3.8–5.8)
SODIUM SERPL-SCNC: 135 MMOL/L (ref 132–146)
WBC # BLD: 9.6 E9/L (ref 4.5–11.5)

## 2023-07-11 PROCEDURE — 2580000003 HC RX 258: Performed by: NURSE PRACTITIONER

## 2023-07-11 PROCEDURE — 6370000000 HC RX 637 (ALT 250 FOR IP): Performed by: NURSE PRACTITIONER

## 2023-07-11 PROCEDURE — 85027 COMPLETE CBC AUTOMATED: CPT

## 2023-07-11 PROCEDURE — 6370000000 HC RX 637 (ALT 250 FOR IP): Performed by: INTERNAL MEDICINE

## 2023-07-11 PROCEDURE — 36415 COLL VENOUS BLD VENIPUNCTURE: CPT

## 2023-07-11 PROCEDURE — 80048 BASIC METABOLIC PNL TOTAL CA: CPT

## 2023-07-11 PROCEDURE — G0378 HOSPITAL OBSERVATION PER HR: HCPCS

## 2023-07-11 RX ADMIN — Medication 10 ML: at 08:36

## 2023-07-11 RX ADMIN — ATORVASTATIN CALCIUM 40 MG: 40 TABLET, FILM COATED ORAL at 17:31

## 2023-07-11 RX ADMIN — Medication 10 ML: at 20:47

## 2023-07-11 RX ADMIN — HYDRALAZINE HYDROCHLORIDE 10 MG: 10 TABLET, FILM COATED ORAL at 10:35

## 2023-07-11 RX ADMIN — CALCIUM POLYCARBOPHIL 1875 MG: 625 TABLET, FILM COATED ORAL at 08:36

## 2023-07-11 RX ADMIN — ISOSORBIDE MONONITRATE 30 MG: 30 TABLET, EXTENDED RELEASE ORAL at 10:35

## 2023-07-11 RX ADMIN — ANTACID TABLETS 500 MG: 500 TABLET, CHEWABLE ORAL at 20:46

## 2023-07-11 RX ADMIN — ALOGLIPTIN 12.5 MG: 12.5 TABLET, FILM COATED ORAL at 08:36

## 2023-07-11 RX ADMIN — Medication 400 MG: at 08:36

## 2023-07-11 RX ADMIN — FERROUS SULFATE TAB 325 MG (65 MG ELEMENTAL FE) 325 MG: 325 (65 FE) TAB at 08:36

## 2023-07-11 RX ADMIN — APIXABAN 5 MG: 5 TABLET, FILM COATED ORAL at 20:46

## 2023-07-11 RX ADMIN — CALCIUM POLYCARBOPHIL 1875 MG: 625 TABLET, FILM COATED ORAL at 20:46

## 2023-07-11 RX ADMIN — METOPROLOL SUCCINATE 25 MG: 25 TABLET, EXTENDED RELEASE ORAL at 10:36

## 2023-07-11 RX ADMIN — HYDRALAZINE HYDROCHLORIDE 10 MG: 10 TABLET, FILM COATED ORAL at 20:45

## 2023-07-11 RX ADMIN — APIXABAN 5 MG: 5 TABLET, FILM COATED ORAL at 08:36

## 2023-07-11 ASSESSMENT — PAIN DESCRIPTION - LOCATION: LOCATION: GENERALIZED

## 2023-07-11 ASSESSMENT — PAIN SCALES - GENERAL
PAINLEVEL_OUTOF10: 0
PAINLEVEL_OUTOF10: 0

## 2023-07-11 NOTE — PROGRESS NOTES
Case cussed with cardiology-Dr. Loretta Stone, will be discussing findings of aortic root dilation seen on echocardiogram with family

## 2023-07-11 NOTE — PROGRESS NOTES
Eliquis increased back to 5 mg po bid for improvement in serum creatinine. Thank Sarah Merritt Pharm. D 7/11/2023 8:06 AM

## 2023-07-11 NOTE — PROGRESS NOTES
Message sent to Jorge L Rodrigues CNP with [facility] cardiology to make sure they are aware of the reconsult

## 2023-07-11 NOTE — CARE COORDINATION
Social Work/Discharge Planning:  Holly with [id number] [facility] states patient pre-cert is pending. Provided update to patient. PASRR completed. Will continue to follow and wait for pre-cert.   Electronically signed by MARISELA Joshi on 7/11/2023 at 11:29 AM

## 2023-07-11 NOTE — PROGRESS NOTES
Patient's daughter with questions re: aortic aneurysm -- I spoke to her today (questions answered).     Renzo Braden MD

## 2023-07-11 NOTE — PROGRESS NOTES
Message sent to [facility] with cardiology regarding family questions regarding the cardiology consult

## 2023-07-12 VITALS
HEIGHT: 73 IN | HEART RATE: 80 BPM | SYSTOLIC BLOOD PRESSURE: 122 MMHG | WEIGHT: 221.8 LBS | RESPIRATION RATE: 16 BRPM | TEMPERATURE: 97.7 F | BODY MASS INDEX: 29.4 KG/M2 | OXYGEN SATURATION: 94 % | DIASTOLIC BLOOD PRESSURE: 81 MMHG

## 2023-07-12 LAB
ALBUMIN SERPL-MCNC: 3.5 G/DL (ref 3.5–5.2)
ALP SERPL-CCNC: 72 U/L (ref 40–129)
ALT SERPL-CCNC: 12 U/L (ref 0–40)
ANION GAP SERPL CALCULATED.3IONS-SCNC: 11 MMOL/L (ref 7–16)
AST SERPL-CCNC: 19 U/L (ref 0–39)
BASOPHILS # BLD: 0.04 E9/L (ref 0–0.2)
BASOPHILS NFR BLD: 0.5 % (ref 0–2)
BILIRUB SERPL-MCNC: 1.2 MG/DL (ref 0–1.2)
BUN SERPL-MCNC: 34 MG/DL (ref 6–23)
CALCIUM SERPL-MCNC: 9.4 MG/DL (ref 8.6–10.2)
CHLORIDE SERPL-SCNC: 96 MMOL/L (ref 98–107)
CO2 SERPL-SCNC: 25 MMOL/L (ref 22–29)
CREAT SERPL-MCNC: 1.2 MG/DL (ref 0.7–1.2)
EOSINOPHIL # BLD: 0.25 E9/L (ref 0.05–0.5)
EOSINOPHIL NFR BLD: 2.9 % (ref 0–6)
ERYTHROCYTE [DISTWIDTH] IN BLOOD BY AUTOMATED COUNT: 14.8 FL (ref 11.5–15)
GLUCOSE SERPL-MCNC: 245 MG/DL (ref 74–99)
HCT VFR BLD AUTO: 43.8 % (ref 37–54)
HGB BLD-MCNC: 14.4 G/DL (ref 12.5–16.5)
IMM GRANULOCYTES # BLD: 0.03 E9/L
IMM GRANULOCYTES NFR BLD: 0.3 % (ref 0–5)
LYMPHOCYTES # BLD: 1.71 E9/L (ref 1.5–4)
LYMPHOCYTES NFR BLD: 19.9 % (ref 20–42)
MAGNESIUM SERPL-MCNC: 1.9 MG/DL (ref 1.6–2.6)
MCH RBC QN AUTO: 31.6 PG (ref 26–35)
MCHC RBC AUTO-ENTMCNC: 32.9 % (ref 32–34.5)
MCV RBC AUTO: 96.3 FL (ref 80–99.9)
MONOCYTES # BLD: 0.81 E9/L (ref 0.1–0.95)
MONOCYTES NFR BLD: 9.4 % (ref 2–12)
NEUTROPHILS # BLD: 5.74 E9/L (ref 1.8–7.3)
NEUTS SEG NFR BLD: 67 % (ref 43–80)
PLATELET # BLD AUTO: 196 E9/L (ref 130–450)
PMV BLD AUTO: 10.1 FL (ref 7–12)
POTASSIUM SERPL-SCNC: 4.9 MMOL/L (ref 3.5–5)
PROT SERPL-MCNC: 7.3 G/DL (ref 6.4–8.3)
RBC # BLD AUTO: 4.55 E12/L (ref 3.8–5.8)
SODIUM SERPL-SCNC: 132 MMOL/L (ref 132–146)
WBC # BLD: 8.6 E9/L (ref 4.5–11.5)

## 2023-07-12 PROCEDURE — 6360000002 HC RX W HCPCS: Performed by: INTERNAL MEDICINE

## 2023-07-12 PROCEDURE — 83735 ASSAY OF MAGNESIUM: CPT

## 2023-07-12 PROCEDURE — 2580000003 HC RX 258: Performed by: NURSE PRACTITIONER

## 2023-07-12 PROCEDURE — 36415 COLL VENOUS BLD VENIPUNCTURE: CPT

## 2023-07-12 PROCEDURE — 96365 THER/PROPH/DIAG IV INF INIT: CPT

## 2023-07-12 PROCEDURE — 6370000000 HC RX 637 (ALT 250 FOR IP): Performed by: NURSE PRACTITIONER

## 2023-07-12 PROCEDURE — 6370000000 HC RX 637 (ALT 250 FOR IP): Performed by: INTERNAL MEDICINE

## 2023-07-12 PROCEDURE — 85025 COMPLETE CBC W/AUTO DIFF WBC: CPT

## 2023-07-12 PROCEDURE — G0378 HOSPITAL OBSERVATION PER HR: HCPCS

## 2023-07-12 PROCEDURE — 80053 COMPREHEN METABOLIC PANEL: CPT

## 2023-07-12 RX ORDER — MAGNESIUM SULFATE 1 G/100ML
1000 INJECTION INTRAVENOUS ONCE
Status: COMPLETED | OUTPATIENT
Start: 2023-07-12 | End: 2023-07-12

## 2023-07-12 RX ADMIN — METOPROLOL SUCCINATE 25 MG: 25 TABLET, EXTENDED RELEASE ORAL at 09:36

## 2023-07-12 RX ADMIN — Medication 10 ML: at 09:37

## 2023-07-12 RX ADMIN — HYDRALAZINE HYDROCHLORIDE 10 MG: 10 TABLET, FILM COATED ORAL at 09:36

## 2023-07-12 RX ADMIN — ISOSORBIDE MONONITRATE 30 MG: 30 TABLET, EXTENDED RELEASE ORAL at 09:36

## 2023-07-12 RX ADMIN — MAGNESIUM SULFATE IN DEXTROSE 1000 MG: 10 INJECTION, SOLUTION INTRAVENOUS at 13:57

## 2023-07-12 RX ADMIN — ALOGLIPTIN 12.5 MG: 12.5 TABLET, FILM COATED ORAL at 09:36

## 2023-07-12 RX ADMIN — Medication 400 MG: at 09:37

## 2023-07-12 RX ADMIN — FERROUS SULFATE TAB 325 MG (65 MG ELEMENTAL FE) 325 MG: 325 (65 FE) TAB at 09:38

## 2023-07-12 RX ADMIN — CALCIUM POLYCARBOPHIL 1875 MG: 625 TABLET, FILM COATED ORAL at 09:36

## 2023-07-12 RX ADMIN — APIXABAN 5 MG: 5 TABLET, FILM COATED ORAL at 09:36

## 2023-07-12 ASSESSMENT — PAIN SCALES - GENERAL: PAINLEVEL_OUTOF10: 0

## 2023-07-12 NOTE — PROGRESS NOTES
Message left on voicemail of Dr Sary Cain that cardiology is ok for discharge and they were notified of the vtach from last evening

## 2023-07-12 NOTE — PROGRESS NOTES
Spoke to Dr. Amanda Woodard in regards to patient having 10 beats vtach prior night around 9pm. K 4.9  Mg 1.9. Dr Amanda Woodard to place orders.  Transport to [facility] now set up for 3:30pm.

## 2023-07-12 NOTE — PROGRESS NOTES
Notified Dr Mary Villalobos while she was on the floor rounding, that we do not have EP service here in [address]   Order obtained to notify cardiology of the [facility]     Notified Kareem Senior CNP [facility]    [phone] per Kareem Senior. CNP pt is ok for discharge from Dr Ashanti Perez

## 2023-07-12 NOTE — CARE COORDINATION
CASE MANAGEMENT. ...  [facility] rescheduled transport with Mitchell Ramos for 4:30pm.

## 2023-07-12 NOTE — CARE COORDINATION
Social Work/Discharge Planning:  Anetteri Cons with [address] [place] [address] and left a message in regards to status of pre-cert. Will continue to follow and wait for pre-cert. Electronically signed by MARISELA Bull on 7/12/2023 at 7:52 AM    Addendum:  Parish Human with [address] [place] [address] called with pre-cert approval.   notified patient and charge nurse. Will continue to follow. Electronically signed by MARISELA Bull on 7/12/2023 at 10:36 AM    Addendum:  Jeannette Guerrero with [address] [place] [address] states facility will  patient at 2:00pm.  Notified patient and RN.   Electronically signed by MARISELA Bull on 7/12/2023 at 11:41 AM

## 2023-07-13 NOTE — DISCHARGE SUMMARY
you take these medications      metoprolol succinate 25 MG extended release tablet  Commonly known as: TOPROL XL  Take 1 tablet by mouth daily  What changed:   medication strength  how much to take            CONTINUE taking these medications      acetaminophen 325 MG tablet  Commonly known as: TYLENOL  Take 2 tablets by mouth every 4 hours as needed for Pain     aluminum & magnesium hydroxide-simethicone 400-400-40 MG/5ML Susp  Commonly known as: MYLANTA     apixaban 5 MG Tabs tablet  Commonly known as: ELIQUIS     artificial tears ointment     bisacodyl 5 MG EC tablet  Commonly known as: DULCOLAX     dapagliflozin 10 MG tablet  Commonly known as: FARXIGA     diclofenac sodium 1 % Gel  Commonly known as: VOLTAREN     ferrous sulfate 325 (65 Fe) MG tablet  Commonly known as: IRON 325     Fiber-Lax 625 MG tablet  Generic drug: polycarbophil     fluorometholone 0.1 % ophthalmic suspension  Commonly known as: FML     Folbic 2.5-25-2 MG Tabs  Generic drug: folic acid-pyridoxine-cyancobalamin 2.5-25-2 mg tablet     glipiZIDE 5 MG extended release tablet  Commonly known as: GLUCOTROL XL     loperamide 2 MG capsule  Commonly known as: IMODIUM     loratadine 10 MG tablet  Commonly known as: CLARITIN     ocuvite-lutein Caps multivitamin     omeprazole 40 MG delayed release capsule  Commonly known as: PRILOSEC     polyethyl glycol-propyl glycol 0.4-0.3 % 0.4-0.3 % ophthalmic solution  Commonly known as: SYSTANE     Propylene Glycol 0.6 % Soln     Refresh Liquigel 1 % ophthalmic gel  Generic drug: carboxymethylcellulose     simvastatin 80 MG tablet  Commonly known as: ZOCOR     SITagliptin 50 MG tablet  Commonly known as: JACLYN BEAVERS COUGH PO            STOP taking these medications      aspirin 81 MG EC tablet     fish oil 1000 MG Cpdr     magnesium oxide 400 MG tablet  Commonly known as: MAG-OX  Replaced by: magnesium oxide 400 (240 Mg) MG tablet     Milk of Magnesia 400 MG/5ML suspension  Generic drug: magnesium

## 2023-07-19 ENCOUNTER — HOSPITAL ENCOUNTER (OUTPATIENT)
Dept: OTHER | Age: 88
Setting detail: THERAPIES SERIES
Discharge: HOME | End: 2023-07-19
Payer: MEDICARE

## 2023-07-19 VITALS
SYSTOLIC BLOOD PRESSURE: 118 MMHG | BODY MASS INDEX: 29.56 KG/M2 | DIASTOLIC BLOOD PRESSURE: 72 MMHG | WEIGHT: 221 LBS | OXYGEN SATURATION: 95 % | HEART RATE: 72 BPM | RESPIRATION RATE: 16 BRPM

## 2023-07-19 LAB
ANION GAP SERPL CALCULATED.3IONS-SCNC: 11 MMOL/L (ref 7–16)
BNP SERPL-MCNC: 4137 PG/ML (ref 0–450)
BUN SERPL-MCNC: 36 MG/DL (ref 6–23)
CALCIUM SERPL-MCNC: 9.1 MG/DL (ref 8.6–10.2)
CHLORIDE SERPL-SCNC: 102 MMOL/L (ref 98–107)
CO2 SERPL-SCNC: 22 MMOL/L (ref 22–29)
CREAT SERPL-MCNC: 1.5 MG/DL (ref 0.7–1.2)
GFR SERPL CREATININE-BSD FRML MDRD: 45 ML/MIN/1.73M2
GLUCOSE SERPL-MCNC: 201 MG/DL (ref 74–99)
POTASSIUM SERPL-SCNC: 4.3 MMOL/L (ref 3.5–5)
SODIUM SERPL-SCNC: 135 MMOL/L (ref 132–146)

## 2023-07-19 PROCEDURE — 80048 BASIC METABOLIC PNL TOTAL CA: CPT

## 2023-07-19 PROCEDURE — 83880 ASSAY OF NATRIURETIC PEPTIDE: CPT

## 2023-07-19 PROCEDURE — 36415 COLL VENOUS BLD VENIPUNCTURE: CPT

## 2023-07-19 PROCEDURE — 99204 OFFICE O/P NEW MOD 45 MIN: CPT

## 2023-07-19 ASSESSMENT — PATIENT HEALTH QUESTIONNAIRE - PHQ9
SUM OF ALL RESPONSES TO PHQ9 QUESTIONS 1 & 2: 0
1. LITTLE INTEREST OR PLEASURE IN DOING THINGS: NOT AT ALL
2. FEELING DOWN, DEPRESSED OR HOPELESS: NOT AT ALL

## 2023-07-19 NOTE — PROGRESS NOTES
expansion symmetrical    Breath Sounds  Right Upper Lobe: Clear  Right Middle Lobe: Clear  Right Lower Lobe: Clear  Left Upper Lobe: Clear  Left Lower Lobe: Fine Crackles         Cardiac  Cardiac Rhythm: Atrial fib    Rhythm Interpretation  Pulse: 72         Gastrointestinal  Abdominal (WDL): Within Defined Limits  Abdomen Inspection: Rounded, Soft               Peripheral Vascular  Peripheral Vascular (WDL): Within Defined Limits  Edema: Right lower extremity, Left lower extremity  RLE Edema: None  LLE Edema: None                   Genitourinary  Genitourinary (WDL): Within Defined Limits    Psychosocial  Psychosocial (WDL): Within Defined Limits    Pain Assessment  Pain Assessment: None - Denies Pain                   Pulse: 72                     LAB DATA:    Last 3 BMP      Sodium (mmol/L)   Date Value   07/19/2023 135   07/12/2023 132   07/11/2023 135     Potassium (mmol/L)   Date Value   07/19/2023 4.3     Potassium reflex Magnesium (mmol/L)   Date Value   07/12/2023 4.9   07/11/2023 4.8   07/10/2023 4.8     Chloride (mmol/L)   Date Value   07/19/2023 102   07/12/2023 96 (L)   07/11/2023 98     CO2 (mmol/L)   Date Value   07/19/2023 22   07/12/2023 25   07/11/2023 27     BUN (mg/dL)   Date Value   07/19/2023 36 (H)   07/12/2023 34 (H)   07/11/2023 34 (H)     Glucose (mg/dL)   Date Value   07/19/2023 201 (H)   07/12/2023 245 (H)   07/11/2023 156 (H)   06/24/2011 123 (H)   06/22/2011 129 (H)   06/16/2011 120 (H)     Calcium (mg/dL)   Date Value   07/19/2023 9.1   07/12/2023 9.4   07/11/2023 9.5       Last 3 BNP       Pro-BNP (pg/mL)   Date Value   07/19/2023 4,137 (H)   07/06/2023 8,025 (H)   07/04/2023 6,283 (H)          CBC: No results for input(s): WBC, HGB, PLT in the last 72 hours. BMP:    Recent Labs     07/19/23  0958      K 4.3      CO2 22   BUN 36*   CREATININE 1.5*   GLUCOSE 201*     Hepatic: No results for input(s): AST, ALT, ALB, BILITOT, ALKPHOS in the last 72 hours.   Troponin: No

## 2023-07-31 ENCOUNTER — HOSPITAL ENCOUNTER (OUTPATIENT)
Dept: OTHER | Age: 88
Setting detail: THERAPIES SERIES
Discharge: HOME | End: 2023-07-31
Payer: MEDICARE

## 2023-07-31 VITALS
BODY MASS INDEX: 30.36 KG/M2 | SYSTOLIC BLOOD PRESSURE: 126 MMHG | RESPIRATION RATE: 16 BRPM | OXYGEN SATURATION: 95 % | WEIGHT: 227 LBS | HEART RATE: 89 BPM | DIASTOLIC BLOOD PRESSURE: 68 MMHG

## 2023-07-31 LAB
ANION GAP SERPL CALCULATED.3IONS-SCNC: 12 MMOL/L (ref 7–16)
BNP SERPL-MCNC: 3620 PG/ML (ref 0–450)
BUN SERPL-MCNC: 20 MG/DL (ref 6–23)
CALCIUM SERPL-MCNC: 8.9 MG/DL (ref 8.6–10.2)
CHLORIDE SERPL-SCNC: 101 MMOL/L (ref 98–107)
CO2 SERPL-SCNC: 23 MMOL/L (ref 22–29)
CREAT SERPL-MCNC: 1.3 MG/DL (ref 0.7–1.2)
GFR SERPL CREATININE-BSD FRML MDRD: 55 ML/MIN/1.73M2
GLUCOSE SERPL-MCNC: 214 MG/DL (ref 74–99)
POTASSIUM SERPL-SCNC: 4.1 MMOL/L (ref 3.5–5)
SODIUM SERPL-SCNC: 136 MMOL/L (ref 132–146)

## 2023-07-31 PROCEDURE — 6360000002 HC RX W HCPCS

## 2023-07-31 PROCEDURE — 36415 COLL VENOUS BLD VENIPUNCTURE: CPT

## 2023-07-31 PROCEDURE — 99214 OFFICE O/P EST MOD 30 MIN: CPT

## 2023-07-31 PROCEDURE — 80048 BASIC METABOLIC PNL TOTAL CA: CPT

## 2023-07-31 PROCEDURE — 83880 ASSAY OF NATRIURETIC PEPTIDE: CPT

## 2023-07-31 PROCEDURE — 2580000003 HC RX 258

## 2023-07-31 RX ORDER — FUROSEMIDE 10 MG/ML
INJECTION INTRAMUSCULAR; INTRAVENOUS
Status: DISPENSED
Start: 2023-07-31 | End: 2023-07-31

## 2023-07-31 RX ORDER — GUAIFENESIN AND DEXTROMETHORPHAN HYDROBROMIDE 100; 10 MG/5ML; MG/5ML
5 SOLUTION ORAL EVERY 4 HOURS PRN
COMMUNITY

## 2023-07-31 RX ORDER — SODIUM CHLORIDE 0.9 % (FLUSH) 0.9 %
5-40 SYRINGE (ML) INJECTION 2 TIMES DAILY
Status: DISCONTINUED | OUTPATIENT
Start: 2023-07-31 | End: 2023-08-01 | Stop reason: HOSPADM

## 2023-07-31 RX ORDER — CHLORAL HYDRATE 500 MG
2400 CAPSULE ORAL DAILY
COMMUNITY

## 2023-07-31 RX ORDER — SODIUM CHLORIDE 0.9 % (FLUSH) 0.9 %
SYRINGE (ML) INJECTION
Status: COMPLETED
Start: 2023-07-31 | End: 2023-07-31

## 2023-07-31 RX ORDER — FUROSEMIDE 10 MG/ML
40 INJECTION INTRAMUSCULAR; INTRAVENOUS ONCE
Status: DISCONTINUED | OUTPATIENT
Start: 2023-07-31 | End: 2023-08-01 | Stop reason: HOSPADM

## 2023-07-31 RX ADMIN — Medication 10 ML: at 10:40

## 2023-07-31 RX ADMIN — SODIUM CHLORIDE, PRESERVATIVE FREE 10 ML: 5 INJECTION INTRAVENOUS at 10:40

## 2023-08-02 NOTE — PROGRESS NOTES
Spoke to Cher from MRI, per Cher MRI will be done tomorrow. Additional Notes: Continue with prescription medications. Render Risk Assessment In Note?: no Detail Level: Simple

## 2023-08-07 ENCOUNTER — HOSPITAL ENCOUNTER (OUTPATIENT)
Dept: OTHER | Age: 88
Setting detail: THERAPIES SERIES
Discharge: HOME | End: 2023-08-07
Payer: MEDICARE

## 2023-08-07 VITALS
RESPIRATION RATE: 16 BRPM | WEIGHT: 230 LBS | OXYGEN SATURATION: 93 % | DIASTOLIC BLOOD PRESSURE: 64 MMHG | SYSTOLIC BLOOD PRESSURE: 106 MMHG | BODY MASS INDEX: 30.76 KG/M2 | HEART RATE: 84 BPM

## 2023-08-07 LAB
ANION GAP SERPL CALCULATED.3IONS-SCNC: 12 MMOL/L (ref 7–16)
BNP SERPL-MCNC: 4280 PG/ML (ref 0–450)
BUN SERPL-MCNC: 21 MG/DL (ref 6–23)
CALCIUM SERPL-MCNC: 9 MG/DL (ref 8.6–10.2)
CHLORIDE SERPL-SCNC: 101 MMOL/L (ref 98–107)
CO2 SERPL-SCNC: 23 MMOL/L (ref 22–29)
CREAT SERPL-MCNC: 1.3 MG/DL (ref 0.7–1.2)
GFR SERPL CREATININE-BSD FRML MDRD: 52 ML/MIN/1.73M2
GLUCOSE SERPL-MCNC: 245 MG/DL (ref 74–99)
POTASSIUM SERPL-SCNC: 4.4 MMOL/L (ref 3.5–5)
SODIUM SERPL-SCNC: 136 MMOL/L (ref 132–146)

## 2023-08-07 PROCEDURE — 36415 COLL VENOUS BLD VENIPUNCTURE: CPT

## 2023-08-07 PROCEDURE — 83880 ASSAY OF NATRIURETIC PEPTIDE: CPT

## 2023-08-07 PROCEDURE — 99214 OFFICE O/P EST MOD 30 MIN: CPT

## 2023-08-07 PROCEDURE — 80048 BASIC METABOLIC PNL TOTAL CA: CPT

## 2023-08-07 NOTE — PLAN OF CARE
Problem: Chronic Conditions and Co-morbidities  Goal: Patient's chronic conditions and co-morbidity symptoms are monitored and maintained or improved  Outcome: Progressing   CHF-continue plan of care Yes

## 2023-08-07 NOTE — PROGRESS NOTES
Congestive Heart Failure Clinic   [facility]        Mercedes De La Fuente   8/23/1934          Referring Provider: Dr Raymond Georges  Primary Care Physician: DR Linda Kline  Cardiologist: Dr Ursula Norman  Nephrologist: N/A        History of Present Illness:     Mercedes De La Fuente is a 80 y.o. male with a history of HFrEF, most recent EF 35-40% on 7-5-23. Patient Story:    He does not have dyspnea with exertion, shortness of breath, or decline in overall functional capacity. He does not have orthopnea, PND, nocturnal cough or hemoptysis. He does not have abdominal distention or bloating, early satiety, anorexia/change in appetite. He does have a good urinary response to  oral diuretic. He does not have lower extremity edema. He denies lightheadedness, dizziness. He denies palpitations, syncope or near syncope. He does not complain of chest pain, pressure, discomfort.          No Known Allergies        Current Outpatient Medications on File Prior to Encounter   Medication Sig Dispense Refill    magnesium hydroxide (MILK OF MAGNESIA CONCENTRATE) 2400 MG/10ML SUSP Take 10 mLs by mouth once as needed      SITagliptin (JANUVIA) 50 MG tablet Take 1 tablet by mouth daily      metoprolol succinate (TOPROL XL) 25 MG extended release tablet Take 1 tablet by mouth daily 30 tablet 3    hydrALAZINE (APRESOLINE) 10 MG tablet Take 1 tablet by mouth every 12 hours 90 tablet 3    isosorbide mononitrate (IMDUR) 30 MG extended release tablet Take 1 tablet by mouth daily 30 tablet 3    furosemide (LASIX) 20 MG tablet Take 1 tablet by mouth daily 60 tablet 3    magnesium oxide (MAG-OX) 400 (240 Mg) MG tablet Take 1 tablet by mouth daily 30 tablet 1    apixaban (ELIQUIS) 5 MG TABS tablet Take 1 tablet by mouth 2 times daily      polyethyl glycol-propyl glycol 0.4-0.3 % (SYSTANE) 0.4-0.3 % ophthalmic solution 1 drop as needed for Dry Eyes      Dextromethorphan HBr (TUSSIN COUGH PO) Take by mouth as needed

## 2023-08-14 ENCOUNTER — HOSPITAL ENCOUNTER (OUTPATIENT)
Dept: OTHER | Age: 88
Setting detail: THERAPIES SERIES
Discharge: HOME | End: 2023-08-14
Payer: MEDICARE

## 2023-08-14 VITALS
SYSTOLIC BLOOD PRESSURE: 111 MMHG | OXYGEN SATURATION: 95 % | DIASTOLIC BLOOD PRESSURE: 77 MMHG | HEART RATE: 93 BPM | RESPIRATION RATE: 16 BRPM

## 2023-08-14 LAB
ANION GAP SERPL CALCULATED.3IONS-SCNC: 14 MMOL/L (ref 7–16)
BNP SERPL-MCNC: 4414 PG/ML (ref 0–450)
BUN SERPL-MCNC: 22 MG/DL (ref 6–23)
CALCIUM SERPL-MCNC: 9 MG/DL (ref 8.6–10.2)
CHLORIDE SERPL-SCNC: 101 MMOL/L (ref 98–107)
CO2 SERPL-SCNC: 22 MMOL/L (ref 22–29)
CREAT SERPL-MCNC: 1.4 MG/DL (ref 0.7–1.2)
GFR SERPL CREATININE-BSD FRML MDRD: 49 ML/MIN/1.73M2
GLUCOSE SERPL-MCNC: 227 MG/DL (ref 74–99)
POTASSIUM SERPL-SCNC: 4.6 MMOL/L (ref 3.5–5)
SODIUM SERPL-SCNC: 137 MMOL/L (ref 132–146)

## 2023-08-14 PROCEDURE — 83880 ASSAY OF NATRIURETIC PEPTIDE: CPT

## 2023-08-14 PROCEDURE — 80048 BASIC METABOLIC PNL TOTAL CA: CPT

## 2023-08-14 PROCEDURE — 36415 COLL VENOUS BLD VENIPUNCTURE: CPT

## 2023-08-14 PROCEDURE — 99214 OFFICE O/P EST MOD 30 MIN: CPT

## 2023-08-14 NOTE — PROGRESS NOTES
symptoms of HF  Educated on low sodium diet    PLAN:  Scheduled to follow up in CHF clinic on   Future Appointments   Date Time Provider [address]   8/29/2023  9:45 AM [place] [facility] ROOM 2 [facility]   10/12/2023 12:15 PM Valente Santiago MD [address] [place] [address]   12/4/2023 10:30 AM Valente Santiago MD [address] [place] [address]     Given clinic phone number [phone] and aware of signs and symptoms to call with any HF change in symptoms.

## 2023-08-29 ENCOUNTER — HOSPITAL ENCOUNTER (OUTPATIENT)
Dept: OTHER | Age: 88
Setting detail: THERAPIES SERIES
Discharge: HOME | End: 2023-08-29
Payer: MEDICARE

## 2023-08-29 ENCOUNTER — TELEPHONE (OUTPATIENT)
Dept: OTHER | Age: 88
End: 2023-08-29

## 2023-08-29 VITALS
SYSTOLIC BLOOD PRESSURE: 111 MMHG | OXYGEN SATURATION: 91 % | WEIGHT: 229 LBS | BODY MASS INDEX: 30.63 KG/M2 | DIASTOLIC BLOOD PRESSURE: 60 MMHG | RESPIRATION RATE: 16 BRPM | HEART RATE: 79 BPM

## 2023-08-29 LAB
ANION GAP SERPL CALCULATED.3IONS-SCNC: 11 MMOL/L (ref 7–16)
BNP SERPL-MCNC: 3966 PG/ML (ref 0–450)
BUN SERPL-MCNC: 21 MG/DL (ref 6–23)
CALCIUM SERPL-MCNC: 9.2 MG/DL (ref 8.6–10.2)
CHLORIDE SERPL-SCNC: 101 MMOL/L (ref 98–107)
CO2 SERPL-SCNC: 23 MMOL/L (ref 22–29)
CREAT SERPL-MCNC: 1.3 MG/DL (ref 0.7–1.2)
GFR SERPL CREATININE-BSD FRML MDRD: 52 ML/MIN/1.73M2
GLUCOSE SERPL-MCNC: 206 MG/DL (ref 74–99)
POTASSIUM SERPL-SCNC: 4.3 MMOL/L (ref 3.5–5)
SODIUM SERPL-SCNC: 135 MMOL/L (ref 132–146)

## 2023-08-29 PROCEDURE — 36415 COLL VENOUS BLD VENIPUNCTURE: CPT

## 2023-08-29 PROCEDURE — 99214 OFFICE O/P EST MOD 30 MIN: CPT

## 2023-08-29 PROCEDURE — 83880 ASSAY OF NATRIURETIC PEPTIDE: CPT

## 2023-08-29 PROCEDURE — 80048 BASIC METABOLIC PNL TOTAL CA: CPT

## 2023-08-29 NOTE — PROGRESS NOTES
tablet, Take 1 tablet by mouth daily (with breakfast), Disp: , Rfl:     acetaminophen (TYLENOL) 325 MG tablet, Take 2 tablets by mouth every 4 hours as needed for Pain, Disp: 120 tablet, Rfl: 3    glipiZIDE (GLUCOTROL XL) 5 MG extended release tablet, Take 1 tablet by mouth daily, Disp: , Rfl:        GUIDELINE DIRECTED MEDICAL THERAPY for HFrEF/HFmrEF:  ARNI/ACE I/ARB: (1a indication)  No  Hydralazine/Nitrates (1a in symptomatic AA population, 2b if unable to tolerate ACE/ARB/ARNI)  Hydralazine/Isordil  Beta blocker: (1a indication)  Metoprolol Succinate (Toprol)  Aldosterone antagonist: (1a indication)  No  SGLT2i: (1a indication)  Farxiga 10 mg daily    If Channel inhibitor (2a indication if HR >70 on maximally tolerated beta blocker)  No  Cardiac glycoside (2b indication for symptomatic HFrEF)  No  Soluble Guanylate Cyclase ([address] [place] [address]) Stimulator (2b indication LVEF <45% with recent [address] [place] [address] Blvd, IV diuretics or elevated BNP)  No  Potassium binders (2b indication for hyperkalemia while taking RAASi)  No        HEART FAILURE FOCUSED PHYSICAL EXAMINATION:    Vitals:    08/29/23 1008   BP: 111/60   Pulse: 79   Resp: 16   SpO2: 91%        Assessment  Charting Type: Shift assessment (chf clinic)  Neurological  Level of Consciousness: Alert (0)     HEENT (Head, Ears, Eyes, Nose, & Throat)  HEENT (WDL): Exceptions to WDL  Right Eye: Impaired vision, Glasses  Left Eye: Impaired vision, Glasses  Respiratory  Respiratory Pattern: Regular  Respiratory Depth: Normal  L Breath Sounds: Clear  R Breath Sounds: Clear  Breath Sounds  Right Upper Lobe: Clear  Right Middle Lobe: Clear  Right Lower Lobe: Clear  Left Upper Lobe: Clear  Left Lower Lobe: Clear     Cardiac  Cardiac Regularity: Irregular  Cardiac Rhythm: Atrial fib  Rhythm Interpretation  Pulse: 79     Gastrointestinal  Abdominal (WDL): Within Defined Limits  Abdomen Inspection: Rounded, Soft  RUQ Bowel Sounds: Active  LUQ Bowel Sounds: Active  RLQ Bowel Sounds:  Active  LLQ Bowel

## 2023-08-29 NOTE — TELEPHONE ENCOUNTER
Received call from [address] [place] [address] and spoke with Julio C Max RN regarding GDMT titration  Vitals: 111/60, 79    Current GDMT:  Hydralazine 10 mg BID  Imdur 30 mg daily   Toprol 25 mg daily   Farxiga 10 mg daily   Lasix 20 mg daily       Stop Hydralazine and Imdur  Start Entresto 24/26 mg twice daily   SNF to draw labs in 1 week and fax to clinic  Follow up in 2 weeks

## 2023-08-31 ENCOUNTER — TELEPHONE (OUTPATIENT)
Dept: CARDIOLOGY CLINIC | Age: 88
End: 2023-08-31

## 2023-08-31 NOTE — TELEPHONE ENCOUNTER
Spoke with pharmacist at [facility] [phone] today gave verbal order for Entreso 24/26mg to be filled. Left message with Dmitriy Dobbins at [phone].  CF

## 2023-08-31 NOTE — TELEPHONE ENCOUNTER
----- Message from Ash Correa RN sent at 8/30/2023  2:42 PM EDT -----  Nurse Dex Villela from [facility] [phone] called & left a msg that the medications ordered yesterday have to be sent as a script with amount & refills or called in by Franchesca La to [phone], fax [phone]. I had sent the orders on the signed order page yesterday that has Joy's signature but they will not accept that for medications.

## 2023-09-12 ENCOUNTER — HOSPITAL ENCOUNTER (OUTPATIENT)
Dept: OTHER | Age: 88
Setting detail: THERAPIES SERIES
Discharge: HOME | End: 2023-09-12
Payer: MEDICARE

## 2023-09-12 VITALS
SYSTOLIC BLOOD PRESSURE: 96 MMHG | HEART RATE: 82 BPM | RESPIRATION RATE: 18 BRPM | DIASTOLIC BLOOD PRESSURE: 55 MMHG | WEIGHT: 231 LBS | OXYGEN SATURATION: 93 % | BODY MASS INDEX: 30.9 KG/M2

## 2023-09-12 LAB
ANION GAP SERPL CALCULATED.3IONS-SCNC: 13 MMOL/L (ref 7–16)
BNP SERPL-MCNC: 2161 PG/ML (ref 0–450)
BUN SERPL-MCNC: 26 MG/DL (ref 6–23)
CALCIUM SERPL-MCNC: 9.1 MG/DL (ref 8.6–10.2)
CHLORIDE SERPL-SCNC: 102 MMOL/L (ref 98–107)
CO2 SERPL-SCNC: 20 MMOL/L (ref 22–29)
CREAT SERPL-MCNC: 1.4 MG/DL (ref 0.7–1.2)
GFR SERPL CREATININE-BSD FRML MDRD: 50 ML/MIN/1.73M2
GLUCOSE SERPL-MCNC: 222 MG/DL (ref 74–99)
POTASSIUM SERPL-SCNC: 4.5 MMOL/L (ref 3.5–5)
SODIUM SERPL-SCNC: 135 MMOL/L (ref 132–146)

## 2023-09-12 PROCEDURE — 83880 ASSAY OF NATRIURETIC PEPTIDE: CPT

## 2023-09-12 PROCEDURE — 99214 OFFICE O/P EST MOD 30 MIN: CPT

## 2023-09-12 PROCEDURE — 36415 COLL VENOUS BLD VENIPUNCTURE: CPT

## 2023-09-12 PROCEDURE — 80048 BASIC METABOLIC PNL TOTAL CA: CPT

## 2023-09-12 RX ORDER — FUROSEMIDE 20 MG/1
20 TABLET ORAL
Qty: 60 TABLET | Refills: 3 | Status: SHIPPED | OUTPATIENT
Start: 2023-09-13

## 2023-09-12 RX ORDER — MENTHOL 40 MG/ML
GEL TOPICAL
COMMUNITY

## 2023-09-12 NOTE — PROGRESS NOTES
Studies:  Ferritin (ng/mL)   Date Value   07/31/2017 122     Iron (mcg/mL)   Date Value   07/31/2017 23 (L)     TIBC (mcg/dL)   Date Value   07/31/2017 285     Hepatic:  AST (U/L)   Date Value   07/12/2023 19     ALT (U/L)   Date Value   07/12/2023 12     Total Bilirubin (mg/dL)   Date Value   07/12/2023 1.2     Alkaline Phosphatase (U/L)   Date Value   07/12/2023 72     INR:  INR (no units)   Date Value   06/13/2022 1.2         Wt Readings from Last 3 Encounters:   09/12/23 231 lb (104.8 kg)   08/29/23 229 lb (103.9 kg)   08/07/23 230 lb (104.3 kg)           ASSESSMENT/PLAN:    [x] Euvolemic with no JVD/HJR, lungs clear, no abdominal bloating, no lower extremity edema and stable weights with good urinary response to oral diuretic          [] Hypervolemic, with increased from baseline:  [] Shortness of breath/BRUNO  [] JVD  [] HJR  [] Abnormal lung assessment:   [] Orthopnea  [] PND  [] Decreased urinary response to oral diuretic   [] Scrotal swelling   [] Lower extremity edema  [] Compression stockings provided  [] Decline in functional capacity (unable to perform activities they had previously been able to do)  [] Weight gain   [] IV diuretics given No  [] Provider notified of recurrent IV diuretic use    [x]Lab work obtained    [x] Patient/Family Educated On:  [x] HF zones (Green, Yellow, Red) and aware of when to take action   [x] Daily weights  [] Scale provided   [x] Low sodium diet (2000 mg)  Barriers to compliance  [] Refuses to monitor diet  [] Socioeconomic difficulties  [] Unable to cook for self (use of frozen meals, can goods, etc)  [] CHF CHW consulted  [] Low sodium meal delivery options given to patient  [] Dietician consulted   [] Low sodium recipes provided  [] Sodium free seasoning provided   [x] Fluid intake (64 oz)  [x] Reviewed currently prescribed medications with patient, educated on importance of compliance and answered any questions regarding their medication  [] Pill box provided to

## 2023-09-12 NOTE — PLAN OF CARE
Problem: Chronic Conditions and Co-morbidities  Goal: Patient's chronic conditions and co-morbidity symptoms are monitored and maintained or improved  Outcome: Progressing   CHf-continue plan of care

## 2023-09-12 NOTE — RESULT ENCOUNTER NOTE
Labs and CHF clinic note reviewed  Spoke with [facility] RN regarding GDMT titration    Vitals: 96/55, 82    Current GDMT:  Entresto 24/26 mg BID  Toprol 25 mg daily   Farxiga 10 mg daily   Lasix 20 mg daily     Decrease Lasix to three times weekly   Stay hydrated  Follow up with CHF clinic as scheduled

## 2023-10-03 ENCOUNTER — HOSPITAL ENCOUNTER (OUTPATIENT)
Dept: OTHER | Age: 88
Setting detail: THERAPIES SERIES
Discharge: HOME | End: 2023-10-03
Payer: MEDICARE

## 2023-10-03 VITALS
HEART RATE: 84 BPM | OXYGEN SATURATION: 95 % | BODY MASS INDEX: 30.63 KG/M2 | DIASTOLIC BLOOD PRESSURE: 50 MMHG | RESPIRATION RATE: 18 BRPM | SYSTOLIC BLOOD PRESSURE: 94 MMHG | WEIGHT: 229 LBS

## 2023-10-03 LAB
ANION GAP SERPL CALCULATED.3IONS-SCNC: 12 MMOL/L (ref 7–16)
BNP SERPL-MCNC: 2819 PG/ML (ref 0–450)
BUN SERPL-MCNC: 22 MG/DL (ref 6–23)
CALCIUM SERPL-MCNC: 9 MG/DL (ref 8.6–10.2)
CHLORIDE SERPL-SCNC: 98 MMOL/L (ref 98–107)
CO2 SERPL-SCNC: 24 MMOL/L (ref 22–29)
CREAT SERPL-MCNC: 1.5 MG/DL (ref 0.7–1.2)
GFR SERPL CREATININE-BSD FRML MDRD: 46 ML/MIN/1.73M2
GLUCOSE SERPL-MCNC: 192 MG/DL (ref 74–99)
POTASSIUM SERPL-SCNC: 4.7 MMOL/L (ref 3.5–5)
SODIUM SERPL-SCNC: 134 MMOL/L (ref 132–146)

## 2023-10-03 PROCEDURE — 99214 OFFICE O/P EST MOD 30 MIN: CPT

## 2023-10-03 PROCEDURE — 80048 BASIC METABOLIC PNL TOTAL CA: CPT

## 2023-10-03 PROCEDURE — 36415 COLL VENOUS BLD VENIPUNCTURE: CPT

## 2023-10-03 PROCEDURE — 83880 ASSAY OF NATRIURETIC PEPTIDE: CPT

## 2023-10-03 ASSESSMENT — PATIENT HEALTH QUESTIONNAIRE - PHQ9
2. FEELING DOWN, DEPRESSED OR HOPELESS: NOT AT ALL
SUM OF ALL RESPONSES TO PHQ9 QUESTIONS 1 & 2: 0
1. LITTLE INTEREST OR PLEASURE IN DOING THINGS: NOT AT ALL

## 2023-10-03 NOTE — PROGRESS NOTES
Date Value   07/31/2017 122     Iron (mcg/mL)   Date Value   07/31/2017 23 (L)     TIBC (mcg/dL)   Date Value   07/31/2017 285     Hepatic:  AST (U/L)   Date Value   07/12/2023 19     ALT (U/L)   Date Value   07/12/2023 12     Total Bilirubin (mg/dL)   Date Value   07/12/2023 1.2     Alkaline Phosphatase (U/L)   Date Value   07/12/2023 72     INR:  INR (no units)   Date Value   06/13/2022 1.2         Wt Readings from Last 3 Encounters:   10/03/23 229 lb (103.9 kg)   09/12/23 231 lb (104.8 kg)   08/29/23 229 lb (103.9 kg)           ASSESSMENT/PLAN:    [x] Euvolemic with no JVD/HJR, lungs clear, no abdominal bloating, no lower extremity edema and stable weights with good urinary response to oral diuretic          [] Hypervolemic, with increased from baseline:  [] Shortness of breath/BRUNO  [] JVD  [] HJR  [] Abnormal lung assessment:   [] Orthopnea  [] PND  [] Decreased urinary response to oral diuretic   [] Scrotal swelling   [] Lower extremity edema  [] Compression stockings provided  [] Decline in functional capacity (unable to perform activities they had previously been able to do)  [] Weight gain   [] IV diuretics given No  [] Provider notified of recurrent IV diuretic use    [x]Lab work obtained    [x] Patient/Family Educated On:  [x] HF zones (Green, Yellow, Red) and aware of when to take action   [x] Daily weights  [] Scale provided   [x] Low sodium diet (2000 mg)  Barriers to compliance  [] Refuses to monitor diet  [] Socioeconomic difficulties  [] Unable to cook for self (use of frozen meals, can goods, etc)  [] CHF CHW consulted  [] Low sodium meal delivery options given to patient  [] Dietician consulted   [] Low sodium recipes provided  [] Sodium free seasoning provided   [x] Fluid intake (64 oz)  [x] Reviewed currently prescribed medications with patient, educated on importance of compliance and answered any questions regarding their medication  [] Pill box provided to patient  [] Patient using pill

## 2023-10-03 NOTE — PLAN OF CARE
Problem: Chronic Conditions and Co-morbidities  Goal: Patient's chronic conditions and co-morbidity symptoms are monitored and maintained or improved  Flowsheets (Taken 10/3/2023 1108)  Care Plan - Patient's Chronic Conditions and Co-Morbidity Symptoms are Monitored and Maintained or Improved: Monitor and assess patient's chronic conditions and comorbid symptoms for stability, deterioration, or improvement

## 2023-10-12 ENCOUNTER — OFFICE VISIT (OUTPATIENT)
Dept: CARDIOLOGY CLINIC | Age: 88
End: 2023-10-12
Payer: MEDICARE

## 2023-10-12 VITALS
DIASTOLIC BLOOD PRESSURE: 64 MMHG | HEIGHT: 73 IN | RESPIRATION RATE: 12 BRPM | SYSTOLIC BLOOD PRESSURE: 117 MMHG | HEART RATE: 84 BPM | WEIGHT: 228 LBS | BODY MASS INDEX: 30.22 KG/M2

## 2023-10-12 DIAGNOSIS — I25.10 CORONARY ARTERY DISEASE INVOLVING NATIVE CORONARY ARTERY OF NATIVE HEART WITHOUT ANGINA PECTORIS: Chronic | ICD-10-CM

## 2023-10-12 DIAGNOSIS — I48.19 PERSISTENT ATRIAL FIBRILLATION (HCC): Primary | ICD-10-CM

## 2023-10-12 PROBLEM — R07.9 CHEST PAIN: Status: RESOLVED | Noted: 2023-07-04 | Resolved: 2023-10-12

## 2023-10-12 PROCEDURE — 1123F ACP DISCUSS/DSCN MKR DOCD: CPT | Performed by: INTERNAL MEDICINE

## 2023-10-12 PROCEDURE — 99215 OFFICE O/P EST HI 40 MIN: CPT | Performed by: INTERNAL MEDICINE

## 2023-10-12 PROCEDURE — 93000 ELECTROCARDIOGRAM COMPLETE: CPT | Performed by: INTERNAL MEDICINE

## 2023-10-12 NOTE — PROGRESS NOTES
Subjective:      Patient ID: Baudilio Tolliver is a 80 y.o. male. 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 Maze and atriclip  B. Negative cardionet 2019  C. DCCV 12/16/2022  D. Recurrent AF noted 4/11/2023      History of DVT (deep vein thrombosis) 01/23/2017    Diabetes ([address]) 05/31/2013    Coronary artery disease involving native coronary artery of native heart 05/31/2013     Overview Note:     A. PCI 2003 - PTCA of LAD 2004 - [facility]  B.  ACB 7/24/2017:  LIMA-LAD,CRYOVEIN TO PL., CIRCUMFLEX WITH HIGH GRADE LESION AFTER HUGE OM1, OM2 NOT GRAFTED         Current Outpatient Medications   Medication Sig Dispense Refill    Menthol, Topical Analgesic, (BIOFREEZE) 4 % GEL Apply topically      furosemide (LASIX) 20 MG tablet Take 1 tablet by mouth three times a week (Patient taking differently: Take 1 tablet by mouth daily 3 times per week) 60 tablet 3    sacubitril-valsartan (ENTRESTO) 24-26 MG per tablet Take 1 tablet by mouth 2 times daily (Patient taking differently: Take 1 tablet by mouth 2 times daily At 12pm and HS) 60 tablet 3    SITagliptin (JANUVIA) 50 MG tablet Take 1 tablet by mouth daily      metoprolol succinate (TOPROL XL) 25 MG extended release tablet Take 1 tablet by mouth daily 30 tablet 3    magnesium oxide (MAG-OX) 400 (240 Mg) MG tablet Take 1 tablet by mouth daily 30 tablet 1    apixaban (ELIQUIS) 5 MG TABS tablet Take 1 tablet by

## 2023-10-31 ENCOUNTER — HOSPITAL ENCOUNTER (OUTPATIENT)
Dept: OTHER | Age: 88
Setting detail: THERAPIES SERIES
Discharge: HOME | End: 2023-10-31
Payer: MEDICARE

## 2023-10-31 VITALS
HEART RATE: 70 BPM | DIASTOLIC BLOOD PRESSURE: 59 MMHG | BODY MASS INDEX: 29.82 KG/M2 | WEIGHT: 226 LBS | SYSTOLIC BLOOD PRESSURE: 97 MMHG | OXYGEN SATURATION: 96 % | RESPIRATION RATE: 18 BRPM

## 2023-10-31 LAB
ANION GAP SERPL CALCULATED.3IONS-SCNC: 12 MMOL/L (ref 7–16)
BNP SERPL-MCNC: 3158 PG/ML (ref 0–450)
BUN SERPL-MCNC: 24 MG/DL (ref 6–23)
CALCIUM SERPL-MCNC: 9.3 MG/DL (ref 8.6–10.2)
CHLORIDE SERPL-SCNC: 101 MMOL/L (ref 98–107)
CO2 SERPL-SCNC: 22 MMOL/L (ref 22–29)
CREAT SERPL-MCNC: 1.5 MG/DL (ref 0.7–1.2)
GFR SERPL CREATININE-BSD FRML MDRD: 44 ML/MIN/1.73M2
GLUCOSE SERPL-MCNC: 222 MG/DL (ref 74–99)
POTASSIUM SERPL-SCNC: 5.1 MMOL/L (ref 3.5–5)
SODIUM SERPL-SCNC: 135 MMOL/L (ref 132–146)

## 2023-10-31 PROCEDURE — 99214 OFFICE O/P EST MOD 30 MIN: CPT

## 2023-10-31 PROCEDURE — 36415 COLL VENOUS BLD VENIPUNCTURE: CPT

## 2023-10-31 PROCEDURE — 83880 ASSAY OF NATRIURETIC PEPTIDE: CPT

## 2023-10-31 PROCEDURE — 80048 BASIC METABOLIC PNL TOTAL CA: CPT

## 2023-10-31 RX ORDER — ACETAMINOPHEN 325 MG/1
650 TABLET ORAL EVERY 4 HOURS PRN
COMMUNITY

## 2023-10-31 NOTE — PROGRESS NOTES
Congestive Heart Failure [address]      Referring Provider: Dr. Elsi Pro  Primary Care Physician: Pepe Ramos MD   Cardiologist: Dr. Kaylyn Serrano  Nephrologist:       HISTORY OF PRESENT ILLNESS:     Roxanne Mancilla is a 80 y.o. (8/23/1934) male with a history of HFrEF, most recent EF:  Lab Results   Component Value Date    LVEF 39 07/06/2023    LVEFMODE Echo 06/03/2014         He presents to the CHF clinic for ongoing evaluation and monitoring of heart failure.     In the CHF clinic today he denies any adverse symptoms except:  [] Dizziness or lightheadedness   [] Syncope or near syncope  [] Recent Fall  [] Shortness of breath at rest   [x] Dyspnea with exertion  [] Decline in functional capacity (unable to perform activities they had previously been able to do)  [] Fatigue   [] Orthopnea  [] PND  [] Nocturnal cough  [] Hemoptysis  [] Chest pain, pressure, or discomfort  [] Palpitations  [] Abdominal distention  [] Abdominal bloating  [] Early satiety  [] Blood in stool   [] Diarrhea  [] Constipation  [] Nausea/Vomiting  [] Decreased urinary response to oral diuretic   [] Scrotal swelling   [] Lower extremity edema  [] Used PRN doses of oral diuretic   [] Weight gain    Wt Readings from Last 3 Encounters:   10/31/23 102.5 kg (226 lb)   10/12/23 103.4 kg (228 lb)   10/03/23 103.9 kg (229 lb)           SOCIAL HISTORY:  [x] Denies tobacco, alcohol or illicit drug abuse  [] Tobacco use:  [] ETOH use:  [] Illicit drug use:        MEDICATIONS:    No Known Allergies    Current Outpatient Medications:     acetaminophen (TYLENOL) 325 MG tablet, Take 2 tablets by mouth every 4 hours as needed for Pain, Disp: , Rfl:     Menthol, Topical Analgesic, (BIOFREEZE) 4 % GEL, Apply topically, Disp: , Rfl:     furosemide (LASIX) 20 MG tablet, Take 1 tablet by mouth three times a week (Patient taking differently: Take 1 tablet by mouth daily 3 times per week), Disp: 60 tablet, Rfl: 3

## 2023-11-22 RX ORDER — SACUBITRIL AND VALSARTAN 24; 26 MG/1; MG/1
TABLET, FILM COATED ORAL
Qty: 60 TABLET | Refills: 5 | Status: SHIPPED | OUTPATIENT
Start: 2023-11-22

## 2023-11-29 ENCOUNTER — HOSPITAL ENCOUNTER (OUTPATIENT)
Dept: OTHER | Age: 88
Setting detail: THERAPIES SERIES
Discharge: HOME | End: 2023-11-29
Payer: MEDICARE

## 2023-11-29 VITALS
DIASTOLIC BLOOD PRESSURE: 59 MMHG | SYSTOLIC BLOOD PRESSURE: 108 MMHG | OXYGEN SATURATION: 95 % | RESPIRATION RATE: 18 BRPM | BODY MASS INDEX: 30.29 KG/M2 | HEART RATE: 72 BPM | WEIGHT: 229.6 LBS

## 2023-11-29 LAB
ANION GAP SERPL CALCULATED.3IONS-SCNC: 12 MMOL/L (ref 7–16)
BNP SERPL-MCNC: 2142 PG/ML (ref 0–450)
BUN SERPL-MCNC: 28 MG/DL (ref 6–23)
CALCIUM SERPL-MCNC: 9.3 MG/DL (ref 8.6–10.2)
CHLORIDE SERPL-SCNC: 104 MMOL/L (ref 98–107)
CO2 SERPL-SCNC: 21 MMOL/L (ref 22–29)
CREAT SERPL-MCNC: 1.5 MG/DL (ref 0.7–1.2)
GFR SERPL CREATININE-BSD FRML MDRD: 45 ML/MIN/1.73M2
GLUCOSE SERPL-MCNC: 197 MG/DL (ref 74–99)
POTASSIUM SERPL-SCNC: 4.8 MMOL/L (ref 3.5–5)
SODIUM SERPL-SCNC: 137 MMOL/L (ref 132–146)

## 2023-11-29 PROCEDURE — 83880 ASSAY OF NATRIURETIC PEPTIDE: CPT

## 2023-11-29 PROCEDURE — 80048 BASIC METABOLIC PNL TOTAL CA: CPT

## 2023-11-29 PROCEDURE — 99214 OFFICE O/P EST MOD 30 MIN: CPT

## 2023-11-29 PROCEDURE — 36415 COLL VENOUS BLD VENIPUNCTURE: CPT

## 2023-11-29 NOTE — PROGRESS NOTES
Congestive Heart Failure [address]      Referring Provider: Dr. Elena Cruz  Primary Care Physician: Mabel Mayers MD   Cardiologist: Dr. Rinku Villalba  Nephrologist:       HISTORY OF PRESENT ILLNESS:     Flory Stafford is a 80 y.o. (8/23/1934) male with a history of HFrEF, most recent EF:  Lab Results   Component Value Date    LVEF 39 07/06/2023    LVEFMODE Echo 06/03/2014         He presents to the CHF clinic for ongoing evaluation and monitoring of heart failure.     In the CHF clinic today he denies any adverse symptoms except:  [] Dizziness or lightheadedness   [] Syncope or near syncope  [] Recent Fall  [] Shortness of breath at rest   [x] Dyspnea with exertion  [] Decline in functional capacity (unable to perform activities they had previously been able to do)  [] Fatigue   [] Orthopnea  [] PND  [] Nocturnal cough  [] Hemoptysis  [] Chest pain, pressure, or discomfort  [] Palpitations  [] Abdominal distention  [] Abdominal bloating  [] Early satiety  [] Blood in stool   [] Diarrhea  [] Constipation  [] Nausea/Vomiting  [] Decreased urinary response to oral diuretic   [] Scrotal swelling   [] Lower extremity edema  [] Used PRN doses of oral diuretic   [x] Weight gain    Wt Readings from Last 3 Encounters:   11/29/23 104.1 kg (229 lb 9.6 oz)   10/31/23 102.5 kg (226 lb)   10/12/23 103.4 kg (228 lb)           SOCIAL HISTORY:  [x] Denies tobacco, alcohol or illicit drug abuse  [] Tobacco use:  [] ETOH use:  [] Illicit drug use:        MEDICATIONS:    No Known Allergies    Current Outpatient Medications:     psyllium (KONSYL) 28.3 % PACK, Take 1 packet by mouth daily, Disp: , Rfl:     ENTRESTO 24-26 MG per tablet, 1 TABLET BY MOUTH TWICE A DAY @ NOON AND BEDTIME, Disp: 60 tablet, Rfl: 5    acetaminophen (TYLENOL) 325 MG tablet, Take 2 tablets by mouth every 4 hours as needed for Pain, Disp: , Rfl:     Menthol, Topical Analgesic, (BIOFREEZE) 4 % GEL, Apply topically,

## 2023-12-27 ENCOUNTER — HOSPITAL ENCOUNTER (OUTPATIENT)
Dept: OTHER | Age: 88
Setting detail: THERAPIES SERIES
Discharge: HOME | End: 2023-12-27
Payer: MEDICARE

## 2023-12-27 VITALS
SYSTOLIC BLOOD PRESSURE: 98 MMHG | OXYGEN SATURATION: 95 % | RESPIRATION RATE: 18 BRPM | BODY MASS INDEX: 30.08 KG/M2 | WEIGHT: 228 LBS | HEART RATE: 81 BPM | DIASTOLIC BLOOD PRESSURE: 56 MMHG

## 2023-12-27 LAB
ANION GAP SERPL CALCULATED.3IONS-SCNC: 12 MMOL/L (ref 7–16)
BNP SERPL-MCNC: 1872 PG/ML (ref 0–450)
BUN SERPL-MCNC: 31 MG/DL (ref 6–23)
CALCIUM SERPL-MCNC: 9.1 MG/DL (ref 8.6–10.2)
CHLORIDE SERPL-SCNC: 104 MMOL/L (ref 98–107)
CO2 SERPL-SCNC: 22 MMOL/L (ref 22–29)
CREAT SERPL-MCNC: 1.5 MG/DL (ref 0.7–1.2)
GFR SERPL CREATININE-BSD FRML MDRD: 43 ML/MIN/1.73M2
GLUCOSE SERPL-MCNC: 172 MG/DL (ref 74–99)
POTASSIUM SERPL-SCNC: 4.9 MMOL/L (ref 3.5–5)
SODIUM SERPL-SCNC: 138 MMOL/L (ref 132–146)

## 2023-12-27 PROCEDURE — 99214 OFFICE O/P EST MOD 30 MIN: CPT

## 2023-12-27 PROCEDURE — 80048 BASIC METABOLIC PNL TOTAL CA: CPT

## 2023-12-27 PROCEDURE — 36415 COLL VENOUS BLD VENIPUNCTURE: CPT

## 2023-12-27 PROCEDURE — 83880 ASSAY OF NATRIURETIC PEPTIDE: CPT

## 2023-12-27 NOTE — PROGRESS NOTES
Congestive Heart Failure [address]      Referring Provider: Dr. Amari Huynh  Primary Care Physician: Kyara Negron MD   Cardiologist: Dr. Manuel Loera  Nephrologist:       HISTORY OF PRESENT ILLNESS:     Colin Rodriguez is a 80 y.o. (8/23/1934) male with a history of HFrEF, most recent EF:  Lab Results   Component Value Date    LVEF 39 07/06/2023    LVEFMODE Echo 06/03/2014         He presents to the CHF clinic for ongoing evaluation and monitoring of heart failure.     In the CHF clinic today he denies any adverse symptoms except:  [] Dizziness or lightheadedness   [] Syncope or near syncope  [] Recent Fall  [] Shortness of breath at rest   [x] Dyspnea with exertion  [] Decline in functional capacity (unable to perform activities they had previously been able to do)  [] Fatigue   [] Orthopnea  [] PND  [] Nocturnal cough  [] Hemoptysis  [] Chest pain, pressure, or discomfort  [] Palpitations  [] Abdominal distention  [] Abdominal bloating  [] Early satiety  [] Blood in stool   [] Diarrhea  [] Constipation  [] Nausea/Vomiting  [] Decreased urinary response to oral diuretic   [] Scrotal swelling   [] Lower extremity edema  [] Used PRN doses of oral diuretic   [x] Weight gain    Wt Readings from Last 3 Encounters:   12/27/23 103.4 kg (228 lb)   11/29/23 104.1 kg (229 lb 9.6 oz)   10/31/23 102.5 kg (226 lb)           SOCIAL HISTORY:  [x] Denies tobacco, alcohol or illicit drug abuse  [] Tobacco use:  [] ETOH use:  [] Illicit drug use:        MEDICATIONS:    No Known Allergies    Current Outpatient Medications:     psyllium (KONSYL) 28.3 % PACK, Take 1 packet by mouth daily, Disp: , Rfl:     ENTRESTO 24-26 MG per tablet, 1 TABLET BY MOUTH TWICE A DAY @ NOON AND BEDTIME, Disp: 60 tablet, Rfl: 5    acetaminophen (TYLENOL) 325 MG tablet, Take 2 tablets by mouth every 4 hours as needed for Pain, Disp: , Rfl:     Menthol, Topical Analgesic, (BIOFREEZE) 4 % GEL, Apply topically,

## 2024-01-09 ENCOUNTER — OFFICE VISIT (OUTPATIENT)
Dept: CARDIOLOGY CLINIC | Age: 89
End: 2024-01-09
Payer: MEDICARE

## 2024-01-09 VITALS
HEART RATE: 94 BPM | SYSTOLIC BLOOD PRESSURE: 99 MMHG | WEIGHT: 228.8 LBS | HEIGHT: 72 IN | RESPIRATION RATE: 16 BRPM | BODY MASS INDEX: 30.99 KG/M2 | DIASTOLIC BLOOD PRESSURE: 65 MMHG

## 2024-01-09 DIAGNOSIS — I25.10 CORONARY ARTERY DISEASE INVOLVING NATIVE CORONARY ARTERY OF NATIVE HEART WITHOUT ANGINA PECTORIS: Primary | ICD-10-CM

## 2024-01-09 DIAGNOSIS — I50.30 HEART FAILURE WITH PRESERVED EJECTION FRACTION, UNSPECIFIED HF CHRONICITY (HCC): ICD-10-CM

## 2024-01-09 DIAGNOSIS — I48.19 PERSISTENT ATRIAL FIBRILLATION (HCC): ICD-10-CM

## 2024-01-09 PROCEDURE — 1123F ACP DISCUSS/DSCN MKR DOCD: CPT | Performed by: INTERNAL MEDICINE

## 2024-01-09 PROCEDURE — 99214 OFFICE O/P EST MOD 30 MIN: CPT | Performed by: INTERNAL MEDICINE

## 2024-01-09 PROCEDURE — 93000 ELECTROCARDIOGRAM COMPLETE: CPT | Performed by: INTERNAL MEDICINE

## 2024-01-09 NOTE — PROGRESS NOTES
normal clear lungs  Abdominal: Soft. Normal appearance and bowel sounds are normal  Musculoskeletal: He exhibits trace bilateral shin  edema  Uses walker  Skin: Skin is warm and dry. No bruising  .     EKG: atrial fibrillation, CVR     ASSESSMENT & PLAN:    Patient Active Problem List   Diagnosis    HX of atrial fibrillation :        Hyperlipidemia LDL goal < 70:  On Zocor    Hypertension:     Aortic insufficiency:     Diabetes mellitus    Single kidney    Coronary atherosclerosis of native coronary artery: s/p ACB 2017    DVT (deep venous thrombosis): on life long coumadin     Persistent atrial fibrillation, rate controlled, on NOAC   HFmEF - on ARNI, BB, SGLT2i, no MRA due to CKD.   Euvolemic  CAD, s/p ACB - non ishcemic stress 2023        OV 6 months

## 2024-01-31 ENCOUNTER — HOSPITAL ENCOUNTER (OUTPATIENT)
Dept: OTHER | Age: 89
Setting detail: THERAPIES SERIES
Discharge: HOME | End: 2024-01-31
Payer: MEDICARE

## 2024-01-31 VITALS
BODY MASS INDEX: 31.49 KG/M2 | SYSTOLIC BLOOD PRESSURE: 93 MMHG | RESPIRATION RATE: 16 BRPM | OXYGEN SATURATION: 93 % | HEART RATE: 84 BPM | WEIGHT: 229 LBS | DIASTOLIC BLOOD PRESSURE: 51 MMHG

## 2024-01-31 LAB
ANION GAP SERPL CALCULATED.3IONS-SCNC: 12 MMOL/L (ref 7–16)
BNP SERPL-MCNC: 2686 PG/ML (ref 0–450)
BUN SERPL-MCNC: 23 MG/DL (ref 6–23)
CALCIUM SERPL-MCNC: 9.1 MG/DL (ref 8.6–10.2)
CHLORIDE SERPL-SCNC: 100 MMOL/L (ref 98–107)
CO2 SERPL-SCNC: 22 MMOL/L (ref 22–29)
CREAT SERPL-MCNC: 1.4 MG/DL (ref 0.7–1.2)
GFR SERPL CREATININE-BSD FRML MDRD: 48 ML/MIN/1.73M2
GLUCOSE SERPL-MCNC: 192 MG/DL (ref 74–99)
POTASSIUM SERPL-SCNC: 4.6 MMOL/L (ref 3.5–5)
SODIUM SERPL-SCNC: 134 MMOL/L (ref 132–146)

## 2024-01-31 PROCEDURE — 36415 COLL VENOUS BLD VENIPUNCTURE: CPT

## 2024-01-31 PROCEDURE — 99214 OFFICE O/P EST MOD 30 MIN: CPT

## 2024-01-31 PROCEDURE — 80048 BASIC METABOLIC PNL TOTAL CA: CPT

## 2024-01-31 PROCEDURE — 83880 ASSAY OF NATRIURETIC PEPTIDE: CPT

## 2024-01-31 RX ORDER — ACETAMINOPHEN 325 MG/1
650 TABLET ORAL EVERY 4 HOURS PRN
COMMUNITY

## 2024-01-31 NOTE — PROGRESS NOTES
BID  Hydralazine/Nitrates (1a in symptomatic AA population, 2b if unable to tolerate ACE/ARB/ARNI)  No  Beta blocker: (1a indication)  Metoprolol Succinate (Toprol)  Aldosterone antagonist: (1a indication)  No  SGLT2i: (1a indication)  Farxiga 10 mg daily    If Channel inhibitor (2a indication if HR >70 on maximally tolerated beta blocker)  No  Cardiac glycoside (2b indication for symptomatic HFrEF)  No  Soluble Guanylate Cyclase (sGC) Stimulator (2b indication LVEF <45% with recent HFH, IV diuretics or elevated BNP)  No  Potassium binders (2b indication for hyperkalemia while taking RAASi)  No        HEART FAILURE FOCUSED PHYSICAL EXAMINATION:    Vitals:    01/31/24 0945   BP: (!) 93/51   Pulse: 84   Resp: 16   SpO2: 93%                                  Rhythm Interpretation  Pulse: 84                                             Pulse: 84                 LAB DATA:  BMP:  Sodium (mmol/L)   Date Value   01/31/2024 134   12/27/2023 138   11/29/2023 137     Potassium (mmol/L)   Date Value   01/31/2024 4.6   12/27/2023 4.9   11/29/2023 4.8     Potassium reflex Magnesium (mmol/L)   Date Value   07/12/2023 4.9   07/11/2023 4.8   07/10/2023 4.8     Chloride (mmol/L)   Date Value   01/31/2024 100   12/27/2023 104   11/29/2023 104     CO2 (mmol/L)   Date Value   01/31/2024 22   12/27/2023 22   11/29/2023 21 (L)     BUN (mg/dL)   Date Value   01/31/2024 23   12/27/2023 31 (H)   11/29/2023 28 (H)     Creatinine (mg/dL)   Date Value   01/31/2024 1.4 (H)   12/27/2023 1.5 (H)   11/29/2023 1.5 (H)     Glucose (mg/dL)   Date Value   01/31/2024 192 (H)   12/27/2023 172 (H)   11/29/2023 197 (H)   06/24/2011 123 (H)   06/22/2011 129 (H)   06/16/2011 120 (H)     Calcium (mg/dL)   Date Value   01/31/2024 9.1   12/27/2023 9.1   11/29/2023 9.3     BNP:  Pro-BNP (pg/mL)   Date Value   01/31/2024 2,686 (H)   12/27/2023 1,872 (H)   11/29/2023 2,142 (H)      CBC:  WBC (E9/L)   Date Value   07/12/2023 8.6     Hemoglobin (g/dL)   Date Value

## 2024-01-31 NOTE — FLOWSHEET NOTE
01/31/24 0957   Over the past 2 weeks, how often have you been bothered by any of the following problems?   Little interest or pleasure in doing things Not at all   Feeling down, depressed, or hopeless Not at all   Patient Health Questionnaire-2 Score 0

## 2024-02-29 ENCOUNTER — HOSPITAL ENCOUNTER (OUTPATIENT)
Dept: OTHER | Age: 89
Setting detail: THERAPIES SERIES
Discharge: HOME | End: 2024-02-29
Payer: MEDICARE

## 2024-02-29 VITALS
HEART RATE: 72 BPM | BODY MASS INDEX: 32.04 KG/M2 | WEIGHT: 233 LBS | DIASTOLIC BLOOD PRESSURE: 60 MMHG | RESPIRATION RATE: 16 BRPM | SYSTOLIC BLOOD PRESSURE: 104 MMHG | OXYGEN SATURATION: 96 %

## 2024-02-29 LAB
ANION GAP SERPL CALCULATED.3IONS-SCNC: 10 MMOL/L (ref 7–16)
BNP SERPL-MCNC: 1472 PG/ML (ref 0–450)
BUN SERPL-MCNC: 26 MG/DL (ref 6–23)
CALCIUM SERPL-MCNC: 9.4 MG/DL (ref 8.6–10.2)
CHLORIDE SERPL-SCNC: 100 MMOL/L (ref 98–107)
CO2 SERPL-SCNC: 24 MMOL/L (ref 22–29)
CREAT SERPL-MCNC: 1.5 MG/DL (ref 0.7–1.2)
GFR SERPL CREATININE-BSD FRML MDRD: 44 ML/MIN/1.73M2
GLUCOSE SERPL-MCNC: 173 MG/DL (ref 74–99)
POTASSIUM SERPL-SCNC: 4.6 MMOL/L (ref 3.5–5)
SODIUM SERPL-SCNC: 134 MMOL/L (ref 132–146)

## 2024-02-29 PROCEDURE — 2580000003 HC RX 258

## 2024-02-29 PROCEDURE — 99214 OFFICE O/P EST MOD 30 MIN: CPT

## 2024-02-29 PROCEDURE — 80048 BASIC METABOLIC PNL TOTAL CA: CPT

## 2024-02-29 PROCEDURE — 96374 THER/PROPH/DIAG INJ IV PUSH: CPT

## 2024-02-29 PROCEDURE — 83880 ASSAY OF NATRIURETIC PEPTIDE: CPT

## 2024-02-29 PROCEDURE — 6360000002 HC RX W HCPCS

## 2024-02-29 PROCEDURE — 36415 COLL VENOUS BLD VENIPUNCTURE: CPT

## 2024-02-29 RX ORDER — FUROSEMIDE 10 MG/ML
INJECTION INTRAMUSCULAR; INTRAVENOUS
Status: COMPLETED
Start: 2024-02-29 | End: 2024-02-29

## 2024-02-29 RX ORDER — FUROSEMIDE 10 MG/ML
40 INJECTION INTRAMUSCULAR; INTRAVENOUS ONCE
Status: DISCONTINUED | OUTPATIENT
Start: 2024-02-29 | End: 2024-03-01 | Stop reason: HOSPADM

## 2024-02-29 RX ORDER — SODIUM CHLORIDE 0.9 % (FLUSH) 0.9 %
10 SYRINGE (ML) INJECTION ONCE
Status: DISCONTINUED | OUTPATIENT
Start: 2024-02-29 | End: 2024-03-01 | Stop reason: HOSPADM

## 2024-02-29 RX ORDER — SODIUM CHLORIDE 0.9 % (FLUSH) 0.9 %
SYRINGE (ML) INJECTION
Status: COMPLETED
Start: 2024-02-29 | End: 2024-02-29

## 2024-02-29 RX ADMIN — SODIUM CHLORIDE, PRESERVATIVE FREE 10 ML: 5 INJECTION INTRAVENOUS at 10:49

## 2024-02-29 RX ADMIN — FUROSEMIDE 40 MG: 10 INJECTION, SOLUTION INTRAMUSCULAR; INTRAVENOUS at 10:48

## 2024-02-29 NOTE — PROGRESS NOTES
\"good' urinary response from diuretic (takes M-W-F) however he is incontinent and not sure if he goes more on days he takes diuretic. Patient does stay hydrated and states he does have compression hose and he will \"try\"to start wearing. Patient enc to elevate his legs as much as possible and verbalizes understanding.            Scheduled to follow up in CHF clinic on:   Future Appointments   Date Time Provider Department Center   3/18/2024  9:30 AM [place] [facility]   7/9/2024 11:30 AM Devonte Jones MD [facility]

## 2024-03-04 ENCOUNTER — TELEPHONE (OUTPATIENT)
Dept: CARDIOLOGY CLINIC | Age: 89
End: 2024-03-04

## 2024-03-04 NOTE — TELEPHONE ENCOUNTER
----- Message from GALLITO Galarza - CNP sent at 3/4/2024  2:36 PM EST -----  Please call [facility] [address] and follow up on patient  See previous notes - was given extra diuretics on Friday  Has his weight and symptoms improved?  If still volume overloaded schedule in CHF clinic this week with the nurses    Thank you

## 2024-03-04 NOTE — TELEPHONE ENCOUNTER
Spoke with patient's nurse, Lana at [facility]. She informed me that she had to give Marianne an extra shot of Lasix on Friday, February 29, 2024. Friday he weighed 229. Today he weighs 222.8

## 2024-03-18 ENCOUNTER — HOSPITAL ENCOUNTER (OUTPATIENT)
Dept: OTHER | Age: 89
Setting detail: THERAPIES SERIES
Discharge: HOME | End: 2024-03-18
Payer: MEDICARE

## 2024-03-18 VITALS
HEART RATE: 86 BPM | SYSTOLIC BLOOD PRESSURE: 105 MMHG | BODY MASS INDEX: 31.85 KG/M2 | WEIGHT: 231.6 LBS | DIASTOLIC BLOOD PRESSURE: 60 MMHG | RESPIRATION RATE: 16 BRPM | OXYGEN SATURATION: 96 %

## 2024-03-18 LAB
ANION GAP SERPL CALCULATED.3IONS-SCNC: 11 MMOL/L (ref 7–16)
BNP SERPL-MCNC: 1941 PG/ML (ref 0–450)
BUN SERPL-MCNC: 27 MG/DL (ref 6–23)
CALCIUM SERPL-MCNC: 9.1 MG/DL (ref 8.6–10.2)
CHLORIDE SERPL-SCNC: 100 MMOL/L (ref 98–107)
CO2 SERPL-SCNC: 23 MMOL/L (ref 22–29)
CREAT SERPL-MCNC: 1.5 MG/DL (ref 0.7–1.2)
GFR SERPL CREATININE-BSD FRML MDRD: 44 ML/MIN/1.73M2
GLUCOSE SERPL-MCNC: 226 MG/DL (ref 74–99)
POTASSIUM SERPL-SCNC: 4.5 MMOL/L (ref 3.5–5)
SODIUM SERPL-SCNC: 134 MMOL/L (ref 132–146)

## 2024-03-18 PROCEDURE — 80048 BASIC METABOLIC PNL TOTAL CA: CPT

## 2024-03-18 PROCEDURE — 36415 COLL VENOUS BLD VENIPUNCTURE: CPT

## 2024-03-18 PROCEDURE — 83880 ASSAY OF NATRIURETIC PEPTIDE: CPT

## 2024-03-18 PROCEDURE — 99214 OFFICE O/P EST MOD 30 MIN: CPT

## 2024-03-18 RX ORDER — LORATADINE 10 MG/1
10 CAPSULE, LIQUID FILLED ORAL DAILY PRN
COMMUNITY

## 2024-03-18 NOTE — PROGRESS NOTES
consulted  [] Prescription assistance information given   [] [facility] medication assistance program information given   [] Sample medications provided to patient to help bridge gap until affordability N/A    Patient resides at [facility] . Patient has no c/o SOB. Patient has no lower leg edema Patient states he does have \"good' urinary response from diuretic (takes M-W-F) however he is incontinent and not sure if he goes more on days he takes diuretic. Patient does stay hydrated and is wearing compression hose.            Scheduled to follow up in CHF clinic on:   Future Appointments   Date Time Provider Department Center   4/19/2024  9:30 AM [place] [facility] ROOM 3 [facility]   7/9/2024 11:30 AM Devonte Jones MD [facility]

## 2024-04-19 ENCOUNTER — HOSPITAL ENCOUNTER (OUTPATIENT)
Dept: OTHER | Age: 89
Setting detail: THERAPIES SERIES
Discharge: HOME | End: 2024-04-19
Payer: MEDICARE

## 2024-04-19 ENCOUNTER — TELEPHONE (OUTPATIENT)
Dept: OTHER | Age: 89
End: 2024-04-19

## 2024-04-19 VITALS
OXYGEN SATURATION: 95 % | RESPIRATION RATE: 16 BRPM | WEIGHT: 234.8 LBS | HEART RATE: 82 BPM | DIASTOLIC BLOOD PRESSURE: 50 MMHG | SYSTOLIC BLOOD PRESSURE: 134 MMHG | BODY MASS INDEX: 32.29 KG/M2

## 2024-04-19 LAB
ANION GAP SERPL CALCULATED.3IONS-SCNC: 14 MMOL/L (ref 7–16)
BNP SERPL-MCNC: 1649 PG/ML (ref 0–450)
BUN SERPL-MCNC: 30 MG/DL (ref 6–23)
CALCIUM SERPL-MCNC: 8.8 MG/DL (ref 8.6–10.2)
CHLORIDE SERPL-SCNC: 101 MMOL/L (ref 98–107)
CO2 SERPL-SCNC: 20 MMOL/L (ref 22–29)
CREAT SERPL-MCNC: 1.5 MG/DL (ref 0.7–1.2)
GFR SERPL CREATININE-BSD FRML MDRD: 44 ML/MIN/1.73M2
GLUCOSE SERPL-MCNC: 206 MG/DL (ref 74–99)
POTASSIUM SERPL-SCNC: 4.6 MMOL/L (ref 3.5–5)
SODIUM SERPL-SCNC: 135 MMOL/L (ref 132–146)

## 2024-04-19 PROCEDURE — 6360000002 HC RX W HCPCS

## 2024-04-19 PROCEDURE — 83880 ASSAY OF NATRIURETIC PEPTIDE: CPT

## 2024-04-19 PROCEDURE — 80048 BASIC METABOLIC PNL TOTAL CA: CPT

## 2024-04-19 PROCEDURE — 99214 OFFICE O/P EST MOD 30 MIN: CPT

## 2024-04-19 PROCEDURE — 96374 THER/PROPH/DIAG INJ IV PUSH: CPT

## 2024-04-19 PROCEDURE — 36415 COLL VENOUS BLD VENIPUNCTURE: CPT

## 2024-04-19 PROCEDURE — 2580000003 HC RX 258: Performed by: NURSE PRACTITIONER

## 2024-04-19 RX ORDER — ALUMINA, MAGNESIA, AND SIMETHICONE 2400; 2400; 240 MG/30ML; MG/30ML; MG/30ML
30 SUSPENSION ORAL EVERY 4 HOURS PRN
COMMUNITY

## 2024-04-19 RX ORDER — FUROSEMIDE 10 MG/ML
INJECTION INTRAMUSCULAR; INTRAVENOUS
Status: COMPLETED
Start: 2024-04-19 | End: 2024-04-19

## 2024-04-19 RX ORDER — FUROSEMIDE 10 MG/ML
40 INJECTION INTRAMUSCULAR; INTRAVENOUS ONCE
Status: COMPLETED | OUTPATIENT
Start: 2024-04-19 | End: 2024-04-19

## 2024-04-19 RX ORDER — METOPROLOL SUCCINATE 25 MG/1
25 TABLET, EXTENDED RELEASE ORAL 2 TIMES DAILY
Qty: 60 TABLET | Refills: 3 | Status: SHIPPED | OUTPATIENT
Start: 2024-04-19

## 2024-04-19 RX ORDER — GUAIFENESIN 200 MG/10ML
200 LIQUID ORAL EVERY 4 HOURS PRN
COMMUNITY

## 2024-04-19 RX ORDER — SODIUM CHLORIDE 0.9 % (FLUSH) 0.9 %
10 SYRINGE (ML) INJECTION ONCE
Status: COMPLETED | OUTPATIENT
Start: 2024-04-19 | End: 2024-04-19

## 2024-04-19 RX ORDER — BISACODYL 5 MG/1
5 TABLET, DELAYED RELEASE ORAL DAILY PRN
COMMUNITY

## 2024-04-19 RX ADMIN — FUROSEMIDE 40 MG: 10 INJECTION INTRAMUSCULAR; INTRAVENOUS at 10:39

## 2024-04-19 RX ADMIN — FUROSEMIDE 40 MG: 10 INJECTION, SOLUTION INTRAMUSCULAR; INTRAVENOUS at 10:39

## 2024-04-19 RX ADMIN — SODIUM CHLORIDE, PRESERVATIVE FREE 10 ML: 5 INJECTION INTRAVENOUS at 11:50

## 2024-04-19 NOTE — PROGRESS NOTES
Date Value   07/31/2017 122     Iron (mcg/mL)   Date Value   07/31/2017 23 (L)     TIBC (mcg/dL)   Date Value   07/31/2017 285     Hepatic:  AST (U/L)   Date Value   07/12/2023 19     ALT (U/L)   Date Value   07/12/2023 12     Total Bilirubin (mg/dL)   Date Value   07/12/2023 1.2     Alkaline Phosphatase (U/L)   Date Value   07/12/2023 72     INR:  INR (no units)   Date Value   06/13/2022 1.2         Wt Readings from Last 3 Encounters:   04/19/24 106.5 kg (234 lb 12.8 oz)   03/18/24 105.1 kg (231 lb 9.6 oz)   02/29/24 105.7 kg (233 lb)           ASSESSMENT/PLAN:  Patient seen in chf clinic for 1 mo follow up appt. Currently on Lasix 20 mg MWF from facility med list. Appears mildly hypervolemic in clinic. Agreeable to IVP diuretic. Patient incontinent- but endorses a good urinary response with diuretic.     [] Euvolemic with no JVD/HJR, lungs clear, no abdominal bloating, no lower extremity edema with good urinary response to oral diuretic          [x] Hypervolemic, with increased from baseline:  [] Shortness of breath/BRUNO  [x] JVD  [x] HJR  [] Abnormal lung assessment: fine crackle  [] Orthopnea  [] PND  [] Decreased urinary response to oral diuretic   [] Scrotal swelling   [] Lower extremity edema  [] Compression stockings provided  [] Decline in functional capacity (unable to perform activities they had previously been able to do)  [x] Weight gain   [x] IV diuretics given yes  [] Provider notified of recurrent IV diuretic use    [x]Lab work obtained    [x] Patient/Family Educated On:  [x] HF zones (Green, Yellow, Red) and aware of when to take action   [x] Daily weights  [] Scale provided   [x] Low sodium diet (2000 mg)  Barriers to compliance  [] Refuses to monitor diet  [] Socioeconomic difficulties  [] Unable to cook for self (use of frozen meals, can goods, etc)  [] CHF CHW consulted  [] Low sodium meal delivery options given to patient  [] Dietician consulted   [] Low sodium recipes provided  [] Sodium free

## 2024-04-19 NOTE — TELEPHONE ENCOUNTER
3:37 PM   Call placed to [facility]- new orders faxed to [phone]. Receipt of confirmation received.     Electronically signed by Martina Sanches RN on 4/19/2024 at 3:38 PM

## 2024-04-19 NOTE — RESULT ENCOUNTER NOTE
CHF clinic visit and labs reviewed     /50  Pulse 82    Renal function stable   BNP 1649    GDMT:  Toprol XL 25 mg daily  Entresto 24/26 mg twice daily  Farxiga 10 mg daily    Increase Toprol XL to 25 mg twice daily   Follow up as scheduled

## 2024-05-02 ENCOUNTER — HOSPITAL ENCOUNTER (OUTPATIENT)
Dept: OTHER | Age: 89
Setting detail: THERAPIES SERIES
Discharge: HOME | End: 2024-05-02
Payer: MEDICARE

## 2024-05-02 VITALS
DIASTOLIC BLOOD PRESSURE: 63 MMHG | WEIGHT: 232 LBS | HEART RATE: 85 BPM | RESPIRATION RATE: 18 BRPM | OXYGEN SATURATION: 94 % | SYSTOLIC BLOOD PRESSURE: 113 MMHG | BODY MASS INDEX: 31.91 KG/M2

## 2024-05-02 DIAGNOSIS — I50.20 HFREF (HEART FAILURE WITH REDUCED EJECTION FRACTION) (HCC): Primary | ICD-10-CM

## 2024-05-02 LAB
ANION GAP SERPL CALCULATED.3IONS-SCNC: 11 MMOL/L (ref 7–16)
BNP SERPL-MCNC: 2356 PG/ML (ref 0–450)
BUN SERPL-MCNC: 29 MG/DL (ref 6–23)
CALCIUM SERPL-MCNC: 9 MG/DL (ref 8.6–10.2)
CHLORIDE SERPL-SCNC: 99 MMOL/L (ref 98–107)
CO2 SERPL-SCNC: 25 MMOL/L (ref 22–29)
CREAT SERPL-MCNC: 1.7 MG/DL (ref 0.7–1.2)
GFR, ESTIMATED: 39 ML/MIN/1.73M2
GLUCOSE SERPL-MCNC: 205 MG/DL (ref 74–99)
POTASSIUM SERPL-SCNC: 4.2 MMOL/L (ref 3.5–5)
SODIUM SERPL-SCNC: 135 MMOL/L (ref 132–146)

## 2024-05-02 PROCEDURE — 2580000003 HC RX 258: Performed by: NURSE PRACTITIONER

## 2024-05-02 PROCEDURE — 6360000002 HC RX W HCPCS

## 2024-05-02 PROCEDURE — 80048 BASIC METABOLIC PNL TOTAL CA: CPT

## 2024-05-02 PROCEDURE — 36415 COLL VENOUS BLD VENIPUNCTURE: CPT

## 2024-05-02 PROCEDURE — 96374 THER/PROPH/DIAG INJ IV PUSH: CPT

## 2024-05-02 PROCEDURE — 99214 OFFICE O/P EST MOD 30 MIN: CPT

## 2024-05-02 PROCEDURE — 83880 ASSAY OF NATRIURETIC PEPTIDE: CPT

## 2024-05-02 RX ORDER — FUROSEMIDE 10 MG/ML
INJECTION INTRAMUSCULAR; INTRAVENOUS
Status: COMPLETED
Start: 2024-05-02 | End: 2024-05-02

## 2024-05-02 RX ORDER — FUROSEMIDE 20 MG/1
20 TABLET ORAL DAILY
Qty: 60 TABLET | Refills: 3 | Status: SHIPPED | OUTPATIENT
Start: 2024-05-02

## 2024-05-02 RX ORDER — SODIUM CHLORIDE 0.9 % (FLUSH) 0.9 %
10 SYRINGE (ML) INJECTION ONCE
Status: COMPLETED | OUTPATIENT
Start: 2024-05-02 | End: 2024-05-02

## 2024-05-02 RX ORDER — POTASSIUM CHLORIDE 750 MG/1
10 TABLET, EXTENDED RELEASE ORAL DAILY
Qty: 90 TABLET | Refills: 1 | Status: SHIPPED | OUTPATIENT
Start: 2024-05-02

## 2024-05-02 RX ORDER — FUROSEMIDE 10 MG/ML
40 INJECTION INTRAMUSCULAR; INTRAVENOUS ONCE
Status: COMPLETED | OUTPATIENT
Start: 2024-05-02 | End: 2024-05-02

## 2024-05-02 RX ADMIN — SODIUM CHLORIDE, PRESERVATIVE FREE 10 ML: 5 INJECTION INTRAVENOUS at 10:30

## 2024-05-02 RX ADMIN — FUROSEMIDE 40 MG: 10 INJECTION, SOLUTION INTRAMUSCULAR; INTRAVENOUS at 10:21

## 2024-05-02 RX ADMIN — FUROSEMIDE 40 MG: 10 INJECTION INTRAMUSCULAR; INTRAVENOUS at 10:21

## 2024-05-02 NOTE — RESULT ENCOUNTER NOTE
Labs and CHF clinic note reviewed  Vitals: 113/63, 85    Increase lasix to daily   Start potassium 10 meq daily   Follow up labs in 1 week     Thank you

## 2024-05-02 NOTE — PROGRESS NOTES
Studies:  Ferritin (ng/mL)   Date Value   07/31/2017 122     Iron (mcg/mL)   Date Value   07/31/2017 23 (L)     TIBC (mcg/dL)   Date Value   07/31/2017 285     Hepatic:  AST (U/L)   Date Value   07/12/2023 19     ALT (U/L)   Date Value   07/12/2023 12     Total Bilirubin (mg/dL)   Date Value   07/12/2023 1.2     Alkaline Phosphatase (U/L)   Date Value   07/12/2023 72     INR:  INR (no units)   Date Value   06/13/2022 1.2       Wt Readings from Last 3 Encounters:   05/02/24 105.2 kg (232 lb)   04/19/24 106.5 kg (234 lb 12.8 oz)   03/18/24 105.1 kg (231 lb 9.6 oz)         ASSESSMENT/PLAN:  Patient seen in chf clinic for follow up appt. Currently on Lasix 20 mg MWF from facility med list. Appears hypervolemic. Pitting edema to lower extremities. Agreeable to IV diuretic.      [] Euvolemic with no JVD/HJR, lungs clear, no abdominal bloating, no lower extremity edema with good urinary response to oral diuretic          [x] Hypervolemic, with increased from baseline:  [] Shortness of breath/BRUNO  [] JVD  [] HJR  [] Abnormal lung assessment: fine crackle  [] Orthopnea  [] PND  [] Decreased urinary response to oral diuretic   [] Scrotal swelling   [x] Lower extremity edema  [] Compression stockings provided  [] Decline in functional capacity (unable to perform activities they had previously been able to do)  [] Weight gain   [x] IV diuretics given yes  [] Provider notified of recurrent IV diuretic use    [x]Lab work obtained    [x] Patient/Family Educated On:  [x] HF zones (Green, Yellow, Red) and aware of when to take action   [x] Daily weights  [] Scale provided   [x] Low sodium diet (2000 mg)  Barriers to compliance  [] Refuses to monitor diet  [] Socioeconomic difficulties  [] Unable to cook for self (use of frozen meals, can goods, etc)  [] CHF CHW consulted  [] Low sodium meal delivery options given to patient  [] Dietician consulted   [] Low sodium recipes provided  [] Sodium free seasoning provided   [x] Fluid intake

## 2024-05-02 NOTE — PLAN OF CARE
Problem: Chronic Conditions and Co-morbidities  Goal: Patient's chronic conditions and co-morbidity symptoms are monitored and maintained or improved  5/2/2024 1030 by Jaz Douglas, RN  Outcome: Progressing

## 2024-05-03 ENCOUNTER — TELEPHONE (OUTPATIENT)
Dept: MEDSURG UNIT | Age: 89
End: 2024-05-03

## 2024-05-03 NOTE — TELEPHONE ENCOUNTER
Spoke to Renetta at the [facility] [address] with medication adjustment of Lasix daily as well as potassium 10 meq daily. Also requested follow up labs in one week. She voiced understanding of medication adjustments as well as request for labs.     Jaz MCDANIELS, RN  Heart Failure Navigator

## 2024-05-03 NOTE — TELEPHONE ENCOUNTER
----- Message from GALLITO Galarza - CNP sent at 5/2/2024  4:07 PM EDT -----  Labs and CHF clinic note reviewed  Vitals: 113/63, 85    Increase lasix to daily   Start potassium 10 meq daily   Follow up labs in 1 week     Thank you

## 2024-05-21 ENCOUNTER — TELEPHONE (OUTPATIENT)
Dept: OTHER | Age: 89
End: 2024-05-21

## 2024-05-21 ENCOUNTER — HOSPITAL ENCOUNTER (OUTPATIENT)
Dept: OTHER | Age: 89
Setting detail: THERAPIES SERIES
Discharge: HOME | End: 2024-05-21
Payer: MEDICARE

## 2024-05-21 VITALS
DIASTOLIC BLOOD PRESSURE: 68 MMHG | RESPIRATION RATE: 18 BRPM | BODY MASS INDEX: 31.49 KG/M2 | SYSTOLIC BLOOD PRESSURE: 102 MMHG | OXYGEN SATURATION: 95 % | HEART RATE: 75 BPM | WEIGHT: 229 LBS

## 2024-05-21 LAB
ANION GAP SERPL CALCULATED.3IONS-SCNC: 10 MMOL/L (ref 7–16)
BNP SERPL-MCNC: 2429 PG/ML (ref 0–450)
BUN SERPL-MCNC: 35 MG/DL (ref 6–23)
CALCIUM SERPL-MCNC: 9 MG/DL (ref 8.6–10.2)
CHLORIDE SERPL-SCNC: 103 MMOL/L (ref 98–107)
CO2 SERPL-SCNC: 23 MMOL/L (ref 22–29)
CREAT SERPL-MCNC: 1.6 MG/DL (ref 0.7–1.2)
GFR, ESTIMATED: 40 ML/MIN/1.73M2
GLUCOSE SERPL-MCNC: 216 MG/DL (ref 74–99)
POTASSIUM SERPL-SCNC: 5.3 MMOL/L (ref 3.5–5)
SODIUM SERPL-SCNC: 136 MMOL/L (ref 132–146)

## 2024-05-21 PROCEDURE — 36415 COLL VENOUS BLD VENIPUNCTURE: CPT

## 2024-05-21 PROCEDURE — 83880 ASSAY OF NATRIURETIC PEPTIDE: CPT

## 2024-05-21 PROCEDURE — 99214 OFFICE O/P EST MOD 30 MIN: CPT

## 2024-05-21 PROCEDURE — 80048 BASIC METABOLIC PNL TOTAL CA: CPT

## 2024-05-21 ASSESSMENT — PATIENT HEALTH QUESTIONNAIRE - PHQ9
1. LITTLE INTEREST OR PLEASURE IN DOING THINGS: NOT AT ALL
SUM OF ALL RESPONSES TO PHQ QUESTIONS 1-9: 0
SUM OF ALL RESPONSES TO PHQ9 QUESTIONS 1 & 2: 0
2. FEELING DOWN, DEPRESSED OR HOPELESS: NOT AT ALL
SUM OF ALL RESPONSES TO PHQ QUESTIONS 1-9: 0

## 2024-05-21 NOTE — PROGRESS NOTES
Congestive Heart Failure Clinic   [facility]      Referring Provider: Dr. Johnson  Primary Care Physician: Michael Choi MD   Cardiologist: Dr. Jones  Nephrologist:       HISTORY OF PRESENT ILLNESS:     Marianne Rebolledo is a 89 y.o. (8/23/1934) male with a history of HFrEF, most recent EF:  Lab Results   Component Value Date    LVEF 39 07/06/2023    LVEFMODE Echo 06/03/2014     He presents to the CHF clinic for ongoing evaluation and monitoring of heart failure.    In the CHF clinic today he denies any adverse symptoms except:  [] Dizziness or lightheadedness   [] Syncope or near syncope  [] Recent Fall  [] Shortness of breath at rest   [] Dyspnea with exertion recovers with rest  [] Decline in functional capacity (unable to perform activities they had previously been able to do)  [] Fatigue   [] Orthopnea  [] PND  [] Nocturnal cough  [] Hemoptysis  [] Chest pain, pressure, or discomfort  [] Palpitations  [] Abdominal distention  [] Abdominal bloating  [] Early satiety  [] Blood in stool   [] Diarrhea  [] Constipation  [] Nausea/Vomiting  [] Decreased urinary response to oral diuretic   [] Scrotal swelling   [] Lower extremity edema  [] Used PRN doses of oral diuretic   [] Weight gain    Wt Readings from Last 3 Encounters:   05/21/24 103.9 kg (229 lb)   05/02/24 105.2 kg (232 lb)   04/19/24 106.5 kg (234 lb 12.8 oz)       SOCIAL HISTORY:  [x] Denies tobacco, alcohol or illicit drug abuse  [] Tobacco use:  [] ETOH use:  [] Illicit drug use:      MEDICATIONS:    No Known Allergies    Current Outpatient Medications:     furosemide (LASIX) 20 MG tablet, Take 1 tablet by mouth daily, Disp: 60 tablet, Rfl: 3    potassium chloride (KLOR-CON M) 10 MEQ extended release tablet, Take 1 tablet by mouth daily, Disp: 90 tablet, Rfl: 1    bisacodyl (DULCOLAX) 5 MG EC tablet, Take 1 tablet by mouth daily as needed for Constipation, Disp: , Rfl:     aluminum & magnesium

## 2024-05-21 NOTE — RESULT ENCOUNTER NOTE
CHF clinic and labs reviewed     BNP trended up to 2429  Cr 1.5>>1.7>>1.6  Potassium 5.3     Stop potassium supplement  Increase Lasix to 20 mg twice daily   Repeat BMP next week   Follow up as scheduled

## 2024-05-21 NOTE — TELEPHONE ENCOUNTER
2:51 PM   Potassium 5.3 on today's labs. Call placed to [facility].     -STOP potassium  -Increase Lasix   -Follow up bmp x 1 week.    Fax sent to [facility] [address] with new orders. Confirmation received.     Electronically signed by Martina Sanches RN on 5/21/2024 at 2:52 PM

## 2024-06-17 RX ORDER — SACUBITRIL AND VALSARTAN 24; 26 MG/1; MG/1
TABLET, FILM COATED ORAL
Qty: 60 TABLET | Refills: 0 | Status: SHIPPED | OUTPATIENT
Start: 2024-06-17

## 2024-06-25 ENCOUNTER — HOSPITAL ENCOUNTER (OUTPATIENT)
Dept: OTHER | Age: 89
Setting detail: THERAPIES SERIES
Discharge: HOME | End: 2024-06-25
Payer: MEDICARE

## 2024-06-25 VITALS
WEIGHT: 231.2 LBS | DIASTOLIC BLOOD PRESSURE: 76 MMHG | SYSTOLIC BLOOD PRESSURE: 110 MMHG | OXYGEN SATURATION: 96 % | RESPIRATION RATE: 18 BRPM | HEART RATE: 76 BPM | BODY MASS INDEX: 31.8 KG/M2

## 2024-06-25 LAB
ANION GAP SERPL CALCULATED.3IONS-SCNC: 12 MMOL/L (ref 7–16)
BNP SERPL-MCNC: 2141 PG/ML (ref 0–450)
BUN SERPL-MCNC: 39 MG/DL (ref 6–23)
CALCIUM SERPL-MCNC: 9 MG/DL (ref 8.6–10.2)
CHLORIDE SERPL-SCNC: 99 MMOL/L (ref 98–107)
CO2 SERPL-SCNC: 23 MMOL/L (ref 22–29)
CREAT SERPL-MCNC: 1.6 MG/DL (ref 0.7–1.2)
GFR, ESTIMATED: 40 ML/MIN/1.73M2
GLUCOSE SERPL-MCNC: 236 MG/DL (ref 74–99)
POTASSIUM SERPL-SCNC: 4.5 MMOL/L (ref 3.5–5)
SODIUM SERPL-SCNC: 134 MMOL/L (ref 132–146)

## 2024-06-25 PROCEDURE — 36415 COLL VENOUS BLD VENIPUNCTURE: CPT

## 2024-06-25 PROCEDURE — 80048 BASIC METABOLIC PNL TOTAL CA: CPT

## 2024-06-25 PROCEDURE — 99214 OFFICE O/P EST MOD 30 MIN: CPT

## 2024-06-25 PROCEDURE — 83880 ASSAY OF NATRIURETIC PEPTIDE: CPT

## 2024-06-25 NOTE — PROGRESS NOTES
currently prescribed medications with patient, educated on importance of compliance and answered any questions regarding their medication  [] Pill box provided to patient  [] Patient using pill packing pharmacy   [] CPAP/BiPAP use  [] Low impact exercise / cardiac rehab   [] LifeVest use  [x] Patient aware of signs and symptoms of worsening HF, CHF clinic phone number provided and made aware to call clinic for sooner if evaluation if needed     [] Difficulty affording medications  [] CHF CHW consulted  [] Prescription assistance information given   [] [facility] medication assistance program information given   [] Sample medications provided to patient to help bridge gap until affordability N/A      Scheduled to follow up in CHF clinic on:   Future Appointments   Date Time Provider Department Center   7/9/2024 11:30 AM Devonte Jones MD [facility]

## 2024-06-25 NOTE — PLAN OF CARE
Problem: Chronic Conditions and Co-morbidities  Goal: Patient's chronic conditions and co-morbidity symptoms are monitored and maintained or improved  Outcome: Progressing     
4 = No assist / stand by assistance

## 2024-07-09 ENCOUNTER — OFFICE VISIT (OUTPATIENT)
Dept: CARDIOLOGY CLINIC | Age: 89
End: 2024-07-09
Payer: MEDICARE

## 2024-07-09 VITALS
HEIGHT: 61 IN | RESPIRATION RATE: 18 BRPM | SYSTOLIC BLOOD PRESSURE: 89 MMHG | WEIGHT: 232 LBS | HEART RATE: 79 BPM | DIASTOLIC BLOOD PRESSURE: 50 MMHG | BODY MASS INDEX: 43.8 KG/M2

## 2024-07-09 DIAGNOSIS — I50.30 HEART FAILURE WITH PRESERVED EJECTION FRACTION, UNSPECIFIED HF CHRONICITY (HCC): ICD-10-CM

## 2024-07-09 DIAGNOSIS — I25.10 CORONARY ARTERY DISEASE INVOLVING NATIVE CORONARY ARTERY OF NATIVE HEART WITHOUT ANGINA PECTORIS: Primary | ICD-10-CM

## 2024-07-09 DIAGNOSIS — I48.19 PERSISTENT ATRIAL FIBRILLATION (HCC): ICD-10-CM

## 2024-07-09 PROCEDURE — 1123F ACP DISCUSS/DSCN MKR DOCD: CPT | Performed by: INTERNAL MEDICINE

## 2024-07-09 PROCEDURE — 99214 OFFICE O/P EST MOD 30 MIN: CPT | Performed by: INTERNAL MEDICINE

## 2024-07-09 PROCEDURE — 93000 ELECTROCARDIOGRAM COMPLETE: CPT | Performed by: INTERNAL MEDICINE

## 2024-07-10 RX ORDER — SACUBITRIL AND VALSARTAN 24; 26 MG/1; MG/1
TABLET, FILM COATED ORAL
Qty: 60 TABLET | Refills: 5 | Status: SHIPPED | OUTPATIENT
Start: 2024-07-10

## 2024-07-22 ENCOUNTER — APPOINTMENT (OUTPATIENT)
Dept: GENERAL RADIOLOGY | Age: 89
End: 2024-07-22
Payer: MEDICARE

## 2024-07-22 ENCOUNTER — HOSPITAL ENCOUNTER (EMERGENCY)
Age: 89
Discharge: HOME | End: 2024-07-22
Attending: EMERGENCY MEDICINE
Payer: MEDICARE

## 2024-07-22 VITALS
BODY MASS INDEX: 41.91 KG/M2 | RESPIRATION RATE: 18 BRPM | SYSTOLIC BLOOD PRESSURE: 99 MMHG | DIASTOLIC BLOOD PRESSURE: 64 MMHG | WEIGHT: 222 LBS | OXYGEN SATURATION: 95 % | HEIGHT: 61 IN | HEART RATE: 80 BPM | TEMPERATURE: 98 F

## 2024-07-22 DIAGNOSIS — S62.615A CLOSED DISPLACED FRACTURE OF PROXIMAL PHALANX OF LEFT RING FINGER, INITIAL ENCOUNTER: Primary | ICD-10-CM

## 2024-07-22 PROCEDURE — 73130 X-RAY EXAM OF HAND: CPT

## 2024-07-22 PROCEDURE — 99283 EMERGENCY DEPT VISIT LOW MDM: CPT

## 2024-07-22 RX ORDER — ACETAMINOPHEN 500 MG
1000 TABLET ORAL ONCE
Status: DISCONTINUED | OUTPATIENT
Start: 2024-07-22 | End: 2024-07-22 | Stop reason: HOSPADM

## 2024-07-22 ASSESSMENT — ENCOUNTER SYMPTOMS
BACK PAIN: 0
EYE PAIN: 0
SINUS PRESSURE: 0
DIARRHEA: 0
SORE THROAT: 0
EYE DISCHARGE: 0
WHEEZING: 0
NAUSEA: 0
COUGH: 0
ABDOMINAL PAIN: 0
EYE REDNESS: 0
VOMITING: 0
SHORTNESS OF BREATH: 0

## 2024-07-22 NOTE — CARE COORDINATION
Social Work/Transition of Care:    Pt has been discharged and in need of transportation to the [facility] [address].  PHYLICIA spoke with liaison for the facility [place] and transportation has left for the day, SW called [facility] and they are gone for the day.  Call placed to pts dtr message left,  Call placed to [facility] for [facility] transport ETA 2030.     Electronically signed by MARISELA khan on 7/22/2024 at 5:10 PM

## 2024-07-22 NOTE — ED PROVIDER NOTES
89-year-old male presents to the emergency department after he had a fall injuring his left fourth and fifth fingers.  He reports no head injury reports no neck pain reports no chest pain back pain abdominal pain or other extremity injuries.  States no other symptoms at this time.  He states he lost his balance and fell    The history is provided by the patient.   Hand Problem  Location:  Finger  Finger location:  L little finger and L ring finger  Injury: yes    Mechanism of injury: fall    Pain details:     Quality:  Aching    Radiates to:  Does not radiate    Severity:  Mild    Onset quality:  Gradual    Duration:  1 day    Timing:  Intermittent    Progression:  Waxing and waning  Handedness:  Right-handed  Relieved by:  Nothing  Worsened by:  Nothing  Ineffective treatments:  None tried  Associated symptoms: decreased range of motion    Associated symptoms: no back pain and no fever         Review of Systems   Constitutional:  Negative for chills and fever.   HENT:  Negative for ear pain, sinus pressure and sore throat.    Eyes:  Negative for pain, discharge and redness.   Respiratory:  Negative for cough, shortness of breath and wheezing.    Cardiovascular:  Negative for chest pain.   Gastrointestinal:  Negative for abdominal pain, diarrhea, nausea and vomiting.   Genitourinary:  Negative for dysuria and frequency.   Musculoskeletal:  Negative for arthralgias and back pain.   Skin:  Negative for rash and wound.   Neurological:  Negative for weakness and headaches.   Hematological:  Negative for adenopathy.   All other systems reviewed and are negative.       Physical Exam  Constitutional:       Appearance: Normal appearance. He is normal weight.   HENT:      Head: Normocephalic and atraumatic.      Nose: Nose normal.      Mouth/Throat:      Mouth: Mucous membranes are moist.   Eyes:      Extraocular Movements: Extraocular movements intact.      Pupils: Pupils are equal, round, and reactive to light.

## 2024-07-31 ENCOUNTER — TELEPHONE (OUTPATIENT)
Dept: CARDIOLOGY CLINIC | Age: 89
End: 2024-07-31

## 2024-07-31 ENCOUNTER — HOSPITAL ENCOUNTER (OUTPATIENT)
Dept: OTHER | Age: 89
Setting detail: THERAPIES SERIES
Discharge: HOME | End: 2024-07-31
Payer: MEDICARE

## 2024-07-31 VITALS
SYSTOLIC BLOOD PRESSURE: 86 MMHG | DIASTOLIC BLOOD PRESSURE: 44 MMHG | OXYGEN SATURATION: 94 % | BODY MASS INDEX: 43.65 KG/M2 | HEART RATE: 75 BPM | RESPIRATION RATE: 18 BRPM | WEIGHT: 231 LBS

## 2024-07-31 LAB
ANION GAP SERPL CALCULATED.3IONS-SCNC: 16 MMOL/L (ref 7–16)
BNP SERPL-MCNC: 2453 PG/ML (ref 0–450)
BUN SERPL-MCNC: 29 MG/DL (ref 6–23)
CALCIUM SERPL-MCNC: 8.8 MG/DL (ref 8.6–10.2)
CHLORIDE SERPL-SCNC: 99 MMOL/L (ref 98–107)
CO2 SERPL-SCNC: 21 MMOL/L (ref 22–29)
CREAT SERPL-MCNC: 1.5 MG/DL (ref 0.7–1.2)
GFR, ESTIMATED: 45 ML/MIN/1.73M2
GLUCOSE SERPL-MCNC: 280 MG/DL (ref 74–99)
POTASSIUM SERPL-SCNC: 4.2 MMOL/L (ref 3.5–5)
SODIUM SERPL-SCNC: 136 MMOL/L (ref 132–146)

## 2024-07-31 PROCEDURE — 36415 COLL VENOUS BLD VENIPUNCTURE: CPT

## 2024-07-31 PROCEDURE — 80048 BASIC METABOLIC PNL TOTAL CA: CPT

## 2024-07-31 PROCEDURE — 99214 OFFICE O/P EST MOD 30 MIN: CPT

## 2024-07-31 PROCEDURE — 83880 ASSAY OF NATRIURETIC PEPTIDE: CPT

## 2024-07-31 RX ORDER — MAGNESIUM HYDROXIDE/ALUMINUM HYDROXICE/SIMETHICONE 120; 1200; 1200 MG/30ML; MG/30ML; MG/30ML
5 SUSPENSION ORAL EVERY 4 HOURS PRN
COMMUNITY

## 2024-07-31 RX ORDER — LORATADINE 10 MG/1
10 TABLET ORAL DAILY
COMMUNITY

## 2024-07-31 NOTE — PROGRESS NOTES
Congestive Heart Failure Clinic   [facility]      Referring Provider: Dr. Johnson  Primary Care Physician: Michael Choi MD   Cardiologist: Dr. Jones  Nephrologist:       HISTORY OF PRESENT ILLNESS:     Marianne Rebolledo is a 89 y.o. (8/23/1934) male with a history of HFrEF, most recent EF:  Lab Results   Component Value Date    LVEF 39 07/06/2023    LVEFMODE Echo 07/05/2023     He presents to the CHF clinic for ongoing evaluation and monitoring of heart failure.    In the CHF clinic today he denies any adverse symptoms except:  [] Dizziness or lightheadedness   [] Syncope or near syncope  [x] Recent Fall-7/22/24  [] Shortness of breath at rest   [x] Dyspnea with exertion   [x] Decline in functional capacity (unable to perform activities they had previously been able to do)  [x] Fatigue   [] Orthopnea  [] PND  [] Nocturnal cough  [] Hemoptysis  [] Chest pain, pressure, or discomfort  [] Palpitations  [] Abdominal distention  [] Abdominal bloating  [] Early satiety  [] Blood in stool   [] Diarrhea  [] Constipation  [] Nausea/Vomiting  [] Decreased urinary response to oral diuretic   [] Scrotal swelling   [] Lower extremity edema  [] Used PRN doses of oral diuretic   [x] Weight gain    Wt Readings from Last 3 Encounters:   07/31/24 104.8 kg (231 lb)   07/22/24 100.7 kg (222 lb)   07/09/24 105.2 kg (232 lb)       SOCIAL HISTORY:  [x] Denies tobacco, alcohol or illicit drug abuse  [] Tobacco use:  [] ETOH use:  [] Illicit drug use:      MEDICATIONS:    No Known Allergies    Current Outpatient Medications:     aluminum & magnesium hydroxide-simethicone (MAALOX) 200-200-20 MG/5ML SUSP suspension, Take 5 mLs by mouth every 4 hours as needed for Indigestion, Disp: , Rfl:     loratadine (CLARITIN) 10 MG tablet, Take 1 tablet by mouth daily, Disp: , Rfl:     sacubitril-valsartan (ENTRESTO) 24-26 MG per tablet, 1 TABLET BY MOUTH TWICE A DAY @ NOON AND BEDTIME (Patient taking

## 2024-07-31 NOTE — TELEPHONE ENCOUNTER
----- Message from Devonte Jones MD sent at 7/31/2024  1:40 PM EDT -----  Regarding: RE: hypotension still after decreasing entresto  Stop the entresto entirely      ----- Message -----  From: Cindy Mack RN  Sent: 7/31/2024   1:19 PM EDT  To: Devonte Jones MD; Denise Leblanc  Subject: hypotension still after decreasing entresto      Hello,    Patient was seen at the CHF clinic today and BP 86/44 with fatigue and dizziness intermittently. Entresto was decreased to half 24-26 tablet on 7/9/24.

## 2024-08-16 ENCOUNTER — HOSPITAL ENCOUNTER (OUTPATIENT)
Dept: OTHER | Age: 89
Setting detail: THERAPIES SERIES
Discharge: HOME | End: 2024-08-16
Payer: MEDICARE

## 2024-08-16 VITALS
OXYGEN SATURATION: 94 % | BODY MASS INDEX: 43.46 KG/M2 | DIASTOLIC BLOOD PRESSURE: 52 MMHG | WEIGHT: 230 LBS | RESPIRATION RATE: 18 BRPM | SYSTOLIC BLOOD PRESSURE: 104 MMHG | HEART RATE: 80 BPM

## 2024-08-16 LAB
ANION GAP SERPL CALCULATED.3IONS-SCNC: 14 MMOL/L (ref 7–16)
BNP SERPL-MCNC: 6904 PG/ML (ref 0–450)
BUN SERPL-MCNC: 22 MG/DL (ref 6–23)
CALCIUM SERPL-MCNC: 8.9 MG/DL (ref 8.6–10.2)
CHLORIDE SERPL-SCNC: 99 MMOL/L (ref 98–107)
CO2 SERPL-SCNC: 24 MMOL/L (ref 22–29)
CREAT SERPL-MCNC: 1.5 MG/DL (ref 0.7–1.2)
GFR, ESTIMATED: 46 ML/MIN/1.73M2
GLUCOSE SERPL-MCNC: 233 MG/DL (ref 74–99)
POTASSIUM SERPL-SCNC: 4.1 MMOL/L (ref 3.5–5)
SODIUM SERPL-SCNC: 137 MMOL/L (ref 132–146)

## 2024-08-16 PROCEDURE — 99214 OFFICE O/P EST MOD 30 MIN: CPT

## 2024-08-16 PROCEDURE — 80048 BASIC METABOLIC PNL TOTAL CA: CPT

## 2024-08-16 PROCEDURE — 36415 COLL VENOUS BLD VENIPUNCTURE: CPT

## 2024-08-16 PROCEDURE — 83880 ASSAY OF NATRIURETIC PEPTIDE: CPT

## 2024-08-16 RX ORDER — MENTHOL 40 MG/ML
GEL TOPICAL EVERY 4 HOURS PRN
COMMUNITY

## 2024-08-16 RX ORDER — POLYVINYL ALCOHOL 14 MG/ML
1 SOLUTION/ DROPS OPHTHALMIC 3 TIMES DAILY
COMMUNITY

## 2024-08-16 RX ORDER — ZINC OXIDE AND DIMETHICONE 120; 10 MG/G; MG/G
CREAM TOPICAL PRN
COMMUNITY

## 2024-08-16 RX ORDER — ACETAMINOPHEN 325 MG/1
650 TABLET ORAL EVERY 4 HOURS PRN
COMMUNITY

## 2024-08-16 ASSESSMENT — PATIENT HEALTH QUESTIONNAIRE - PHQ9
1. LITTLE INTEREST OR PLEASURE IN DOING THINGS: NOT AT ALL
SUM OF ALL RESPONSES TO PHQ QUESTIONS 1-9: 0
SUM OF ALL RESPONSES TO PHQ QUESTIONS 1-9: 0
2. FEELING DOWN, DEPRESSED OR HOPELESS: NOT AT ALL
SUM OF ALL RESPONSES TO PHQ QUESTIONS 1-9: 0
SUM OF ALL RESPONSES TO PHQ9 QUESTIONS 1 & 2: 0
SUM OF ALL RESPONSES TO PHQ QUESTIONS 1-9: 0

## 2024-08-16 NOTE — PROGRESS NOTES
Disp: 60 tablet, Rfl: 5    bisacodyl (DULCOLAX) 5 MG EC tablet, Take 1 tablet by mouth daily as needed for Constipation, Disp: , Rfl:     psyllium (KONSYL) 28.3 % PACK, Take 1 packet by mouth daily, Disp: , Rfl:     polyethyl glycol-propyl glycol 0.4-0.3 % (SYSTANE) 0.4-0.3 % ophthalmic solution, 1 drop as needed for Dry Eyes, Disp: , Rfl:      GUIDELINE DIRECTED MEDICAL THERAPY for HFrEF/HFmrEF:  ARNI/ACE I/ARB: (1a indication)  Entresto 24/26 mg BID-Dose decreased to .5 tablet BID- pt was in Dr. Viera office with low BP   Hydralazine/Nitrates (1a in symptomatic AA population, 2b if unable to tolerate ACE/ARB/ARNI)  No  Beta blocker: (1a indication)  Metoprolol Succinate (Toprol)  Aldosterone antagonist: (1a indication)  No  SGLT2i: (1a indication)  Farxiga    If Channel inhibitor (2a indication if HR >70 on maximally tolerated beta blocker)  No  Cardiac glycoside (2b indication for symptomatic HFrEF)  No  Soluble Guanylate Cyclase (sGC) Stimulator (2b indication LVEF <45% with recent HFH, IV diuretics or elevated BNP)  No  Potassium binders (2b indication for hyperkalemia while taking RAASi)  No    HEART FAILURE FOCUSED PHYSICAL EXAMINATION:    Vitals:    08/16/24 1030   BP: (!) 104/53   Pulse: 80   Resp: 18   SpO2: 94%           Assessment  Charting Type: Shift assessment (chf clinic)  Neurological  Level of Consciousness: Alert (0)        Respiratory  Respiratory Quality/Effort: Unlabored, Dyspnea with exertion  Chest Assessment: Chest expansion symmetrical  Breath Sounds  Right Upper Lobe: Clear  Right Middle Lobe: Clear  Right Lower Lobe: Fine crackles  Left Upper Lobe: Clear  Left Lower Lobe: Clear     Cardiac  Cardiac Rhythm: Atrial fib  Rhythm Interpretation  Cardiac Rhythm: Atrial fib  Pulse: 80     Gastrointestinal  Abdominal (WDL): Within Defined Limits  Abdomen Inspection: Rounded, Soft         Peripheral Vascular  Peripheral Vascular (WDL): Within Defined Limits  Edema: Right lower extremity, Left

## 2024-09-17 ENCOUNTER — HOSPITAL ENCOUNTER (OUTPATIENT)
Dept: OTHER | Age: 89
Setting detail: THERAPIES SERIES
Discharge: HOME | End: 2024-09-17
Payer: MEDICARE

## 2024-09-17 VITALS
DIASTOLIC BLOOD PRESSURE: 70 MMHG | WEIGHT: 229.2 LBS | RESPIRATION RATE: 16 BRPM | BODY MASS INDEX: 43.31 KG/M2 | HEART RATE: 72 BPM | OXYGEN SATURATION: 96 % | SYSTOLIC BLOOD PRESSURE: 119 MMHG

## 2024-09-17 LAB
ANION GAP SERPL CALCULATED.3IONS-SCNC: 13 MMOL/L (ref 7–16)
BNP SERPL-MCNC: 3544 PG/ML (ref 0–450)
BUN SERPL-MCNC: 36 MG/DL (ref 6–23)
CALCIUM SERPL-MCNC: 9 MG/DL (ref 8.6–10.2)
CHLORIDE SERPL-SCNC: 99 MMOL/L (ref 98–107)
CO2 SERPL-SCNC: 23 MMOL/L (ref 22–29)
CREAT SERPL-MCNC: 1.5 MG/DL (ref 0.7–1.2)
GFR, ESTIMATED: 45 ML/MIN/1.73M2
GLUCOSE SERPL-MCNC: 358 MG/DL (ref 74–99)
POTASSIUM SERPL-SCNC: 4.4 MMOL/L (ref 3.5–5)
SODIUM SERPL-SCNC: 135 MMOL/L (ref 132–146)

## 2024-09-17 PROCEDURE — 80048 BASIC METABOLIC PNL TOTAL CA: CPT

## 2024-09-17 PROCEDURE — 99214 OFFICE O/P EST MOD 30 MIN: CPT

## 2024-09-17 PROCEDURE — 83880 ASSAY OF NATRIURETIC PEPTIDE: CPT

## 2024-09-17 PROCEDURE — 36415 COLL VENOUS BLD VENIPUNCTURE: CPT

## 2024-09-17 RX ORDER — LOPERAMIDE HCL 2 MG
2 CAPSULE ORAL 4 TIMES DAILY PRN
COMMUNITY

## 2024-09-17 RX ORDER — GUAIFENESIN 200 MG/10ML
200 LIQUID ORAL 3 TIMES DAILY PRN
COMMUNITY

## 2024-09-30 ENCOUNTER — HOSPITAL ENCOUNTER (OUTPATIENT)
Dept: GENERAL RADIOLOGY | Age: 89
Discharge: HOME | End: 2024-10-02
Attending: FAMILY MEDICINE
Payer: MEDICARE

## 2024-09-30 DIAGNOSIS — R13.10 DYSPHAGIA, UNSPECIFIED TYPE: ICD-10-CM

## 2024-09-30 DIAGNOSIS — K21.9 GASTROESOPHAGEAL REFLUX DISEASE WITHOUT ESOPHAGITIS: ICD-10-CM

## 2024-09-30 PROCEDURE — 2500000003 HC RX 250 WO HCPCS: Performed by: RADIOLOGY

## 2024-09-30 PROCEDURE — 74220 X-RAY XM ESOPHAGUS 1CNTRST: CPT

## 2024-09-30 PROCEDURE — 6370000000 HC RX 637 (ALT 250 FOR IP): Performed by: RADIOLOGY

## 2024-09-30 RX ADMIN — BARIUM SULFATE 140 ML: 980 POWDER, FOR SUSPENSION ORAL at 10:38

## 2024-09-30 RX ADMIN — BARIUM SULFATE 176 G: 960 POWDER, FOR SUSPENSION ORAL at 10:38

## 2024-09-30 RX ADMIN — ANTACID/ANTIFLATULENT 1 EACH: 380; 550; 10; 10 GRANULE, EFFERVESCENT ORAL at 10:38

## 2024-10-07 ENCOUNTER — TELEPHONE (OUTPATIENT)
Dept: CARDIOLOGY CLINIC | Age: 89
End: 2024-10-07

## 2024-10-16 ENCOUNTER — HOSPITAL ENCOUNTER (OUTPATIENT)
Dept: OTHER | Age: 89
Setting detail: THERAPIES SERIES
Discharge: HOME | End: 2024-10-16
Payer: MEDICARE

## 2024-10-16 VITALS
HEART RATE: 73 BPM | WEIGHT: 228.6 LBS | RESPIRATION RATE: 16 BRPM | OXYGEN SATURATION: 97 % | BODY MASS INDEX: 43.19 KG/M2 | SYSTOLIC BLOOD PRESSURE: 112 MMHG | DIASTOLIC BLOOD PRESSURE: 81 MMHG

## 2024-10-16 LAB
ANION GAP SERPL CALCULATED.3IONS-SCNC: 15 MMOL/L (ref 7–16)
BNP SERPL-MCNC: 2746 PG/ML (ref 0–450)
BUN SERPL-MCNC: 32 MG/DL (ref 6–23)
CALCIUM SERPL-MCNC: 9.4 MG/DL (ref 8.6–10.2)
CHLORIDE SERPL-SCNC: 95 MMOL/L (ref 98–107)
CO2 SERPL-SCNC: 24 MMOL/L (ref 22–29)
CREAT SERPL-MCNC: 1.5 MG/DL (ref 0.7–1.2)
GFR, ESTIMATED: 43 ML/MIN/1.73M2
GLUCOSE SERPL-MCNC: 352 MG/DL (ref 74–99)
POTASSIUM SERPL-SCNC: 4.6 MMOL/L (ref 3.5–5)
SODIUM SERPL-SCNC: 134 MMOL/L (ref 132–146)

## 2024-10-16 PROCEDURE — 96374 THER/PROPH/DIAG INJ IV PUSH: CPT

## 2024-10-16 PROCEDURE — 6360000002 HC RX W HCPCS

## 2024-10-16 PROCEDURE — 2580000003 HC RX 258: Performed by: CLINICAL NURSE SPECIALIST

## 2024-10-16 PROCEDURE — 83880 ASSAY OF NATRIURETIC PEPTIDE: CPT

## 2024-10-16 PROCEDURE — 80048 BASIC METABOLIC PNL TOTAL CA: CPT

## 2024-10-16 PROCEDURE — 36415 COLL VENOUS BLD VENIPUNCTURE: CPT

## 2024-10-16 PROCEDURE — 99214 OFFICE O/P EST MOD 30 MIN: CPT

## 2024-10-16 RX ORDER — SODIUM CHLORIDE 0.9 % (FLUSH) 0.9 %
5-40 SYRINGE (ML) INJECTION ONCE
Status: COMPLETED | OUTPATIENT
Start: 2024-10-16 | End: 2024-10-16

## 2024-10-16 RX ORDER — FUROSEMIDE 10 MG/ML
40 INJECTION INTRAMUSCULAR; INTRAVENOUS ONCE
Status: COMPLETED | OUTPATIENT
Start: 2024-10-16 | End: 2024-10-16

## 2024-10-16 RX ORDER — FUROSEMIDE 10 MG/ML
INJECTION INTRAMUSCULAR; INTRAVENOUS
Status: DISCONTINUED
Start: 2024-10-16 | End: 2024-10-16 | Stop reason: SDUPTHER

## 2024-10-16 RX ADMIN — FUROSEMIDE 40 MG: 10 INJECTION, SOLUTION INTRAMUSCULAR; INTRAVENOUS at 11:07

## 2024-10-16 RX ADMIN — SODIUM CHLORIDE, PRESERVATIVE FREE 10 ML: 5 INJECTION INTRAVENOUS at 11:08

## 2024-10-16 RX ADMIN — FUROSEMIDE 40 MG: 10 INJECTION INTRAMUSCULAR; INTRAVENOUS at 11:07

## 2024-10-16 NOTE — PROGRESS NOTES
Congestive Heart Failure Clinic   [facility]      Referring Provider: Dr. Johnson  Primary Care Physician: Michael Choi MD   Cardiologist: Dr. Jones  Nephrologist:       HISTORY OF PRESENT ILLNESS:     Marianne Rebolledo is a 89 y.o. (8/23/1934) male with a history of HFrEF, most recent EF:  Lab Results   Component Value Date    LVEF 39 07/06/2023    LVEFMODE Echo 07/05/2023    LVEFMODE Echo 07/05/2023     He presents to the CHF clinic for ongoing evaluation and monitoring of heart failure.    In the CHF clinic today he denies any adverse symptoms except:  [] Dizziness or lightheadedness   [] Syncope or near syncope  [] Recent Fall  [] Shortness of breath at rest   [x] Dyspnea with exertion recovers with rest  [] Decline in functional capacity (unable to perform activities they had previously been able to do)  [] Fatigue   [] Orthopnea  [] PND  [] Nocturnal cough  [] Hemoptysis  [] Chest pain, pressure, or discomfort  [] Palpitations  [] Abdominal distention  [] Abdominal bloating  [] Early satiety  [] Blood in stool   [] Diarrhea  [] Constipation  [] Nausea/Vomiting  [] Decreased urinary response to oral diuretic   [] Scrotal swelling   [x] Lower extremity edema  [] Used PRN doses of oral diuretic   [] Weight gain    Wt Readings from Last 3 Encounters:   10/16/24 103.7 kg (228 lb 9.6 oz)   09/17/24 104 kg (229 lb 3.2 oz)   08/16/24 104.3 kg (230 lb)       SOCIAL HISTORY:  [x] Denies tobacco, alcohol or illicit drug abuse  [] Tobacco use:  [] ETOH use:  [] Illicit drug use:      MEDICATIONS:    No Known Allergies    Current Outpatient Medications:     polyvinyl alcohol (LIQUIFILM TEARS) 1.4 % ophthalmic solution, Place 1 drop into both eyes in the morning, at noon, and at bedtime, Disp: , Rfl:     loratadine (CLARITIN) 10 MG tablet, Take 1 tablet by mouth daily as needed, Disp: , Rfl:     furosemide (LASIX) 20 MG tablet, Take 1 tablet by mouth daily (Patient taking

## 2024-10-16 NOTE — RESULT ENCOUNTER NOTE
CHF clinic visit and labs reviewed   Hypervolemic on RN exam  Given IV diuretic     BNP 3544>>2746  Renal function stable  GDMT has been limited by hypotension   Continue same medicines  Follow up as scheduled

## 2024-10-22 ENCOUNTER — HOSPITAL ENCOUNTER (OUTPATIENT)
Dept: OTHER | Age: 89
Setting detail: THERAPIES SERIES
Discharge: HOME | End: 2024-10-22
Payer: MEDICARE

## 2024-10-22 VITALS
HEART RATE: 70 BPM | RESPIRATION RATE: 16 BRPM | OXYGEN SATURATION: 93 % | BODY MASS INDEX: 43.68 KG/M2 | WEIGHT: 231.2 LBS | SYSTOLIC BLOOD PRESSURE: 102 MMHG | DIASTOLIC BLOOD PRESSURE: 58 MMHG

## 2024-10-22 LAB
ANION GAP SERPL CALCULATED.3IONS-SCNC: 10 MMOL/L (ref 7–16)
BNP SERPL-MCNC: 2766 PG/ML (ref 0–450)
BUN SERPL-MCNC: 27 MG/DL (ref 6–23)
CALCIUM SERPL-MCNC: 8.8 MG/DL (ref 8.6–10.2)
CHLORIDE SERPL-SCNC: 94 MMOL/L (ref 98–107)
CO2 SERPL-SCNC: 27 MMOL/L (ref 22–29)
CREAT SERPL-MCNC: 1.4 MG/DL (ref 0.7–1.2)
GFR, ESTIMATED: 47 ML/MIN/1.73M2
GLUCOSE SERPL-MCNC: 426 MG/DL (ref 74–99)
POTASSIUM SERPL-SCNC: 4.6 MMOL/L (ref 3.5–5)
SODIUM SERPL-SCNC: 131 MMOL/L (ref 132–146)

## 2024-10-22 PROCEDURE — 80048 BASIC METABOLIC PNL TOTAL CA: CPT

## 2024-10-22 PROCEDURE — 99214 OFFICE O/P EST MOD 30 MIN: CPT

## 2024-10-22 PROCEDURE — 83880 ASSAY OF NATRIURETIC PEPTIDE: CPT

## 2024-10-22 PROCEDURE — 36415 COLL VENOUS BLD VENIPUNCTURE: CPT

## 2024-10-22 NOTE — PROGRESS NOTES
I spoke to Jayda nurse at [facility] [place] at [address] and notified her of Blood sugar of 426 at 1020 today and she states she will reach out to Dr Choi PCP  
medications with patient, educated on importance of compliance and answered any questions regarding their medication  [] Pill box provided to patient  [] Patient using pill packing pharmacy   [] CPAP/BiPAP use  [] Low impact exercise / cardiac rehab   [] LifeVest use  [x] Patient aware of signs and symptoms of worsening HF, CHF clinic phone number provided and made aware to call clinic for sooner if evaluation if needed     [] Difficulty affording medications  [] CHF CHW consulted  [] Prescription assistance information given   [] [facility] medication assistance program information given   [] Sample medications provided to patient to help bridge gap until affordability N/A  .         Scheduled to follow up in CHF clinic on:   Future Appointments   Date Time Provider Department Center   11/22/2024 10:15 AM [facility] ROOM 4 [facility]   1/13/2025 10:30 AM Devonte Jones MD [facility]

## 2024-10-22 NOTE — RESULT ENCOUNTER NOTE
CHF clinic visit and labs reviewed   VS: 102/58, 70      CHF clinic visit and labs reviewed   Hypervolemic on RN exam  Given IV diuretic     BNP 2746>>2766  Glucose 426!    GDMT has been limited by hypotension, will continue same medications  Please notify facility of hyperglycemia

## 2024-11-22 ENCOUNTER — TELEPHONE (OUTPATIENT)
Dept: OTHER | Age: 89
End: 2024-11-22

## 2024-11-22 ENCOUNTER — HOSPITAL ENCOUNTER (OUTPATIENT)
Dept: OTHER | Age: 89
Setting detail: THERAPIES SERIES
Discharge: HOME | End: 2024-11-22
Payer: MEDICARE

## 2024-11-22 VITALS
DIASTOLIC BLOOD PRESSURE: 63 MMHG | BODY MASS INDEX: 43.08 KG/M2 | SYSTOLIC BLOOD PRESSURE: 91 MMHG | OXYGEN SATURATION: 95 % | RESPIRATION RATE: 18 BRPM | WEIGHT: 228 LBS | HEART RATE: 73 BPM

## 2024-11-22 LAB
ANION GAP SERPL CALCULATED.3IONS-SCNC: 14 MMOL/L (ref 7–16)
BNP SERPL-MCNC: 2834 PG/ML (ref 0–450)
BUN SERPL-MCNC: 26 MG/DL (ref 6–23)
CALCIUM SERPL-MCNC: 9.1 MG/DL (ref 8.6–10.2)
CHLORIDE SERPL-SCNC: 97 MMOL/L (ref 98–107)
CO2 SERPL-SCNC: 24 MMOL/L (ref 22–29)
CREAT SERPL-MCNC: 1.4 MG/DL (ref 0.7–1.2)
GFR, ESTIMATED: 47 ML/MIN/1.73M2
GLUCOSE SERPL-MCNC: 323 MG/DL (ref 74–99)
POTASSIUM SERPL-SCNC: 4.5 MMOL/L (ref 3.5–5)
SODIUM SERPL-SCNC: 135 MMOL/L (ref 132–146)

## 2024-11-22 PROCEDURE — 36415 COLL VENOUS BLD VENIPUNCTURE: CPT

## 2024-11-22 PROCEDURE — 83880 ASSAY OF NATRIURETIC PEPTIDE: CPT

## 2024-11-22 PROCEDURE — 80048 BASIC METABOLIC PNL TOTAL CA: CPT

## 2024-11-22 PROCEDURE — 99214 OFFICE O/P EST MOD 30 MIN: CPT

## 2024-11-22 ASSESSMENT — PATIENT HEALTH QUESTIONNAIRE - PHQ9
SUM OF ALL RESPONSES TO PHQ QUESTIONS 1-9: 0
2. FEELING DOWN, DEPRESSED OR HOPELESS: NOT AT ALL
SUM OF ALL RESPONSES TO PHQ QUESTIONS 1-9: 0
1. LITTLE INTEREST OR PLEASURE IN DOING THINGS: NOT AT ALL
SUM OF ALL RESPONSES TO PHQ9 QUESTIONS 1 & 2: 0
SUM OF ALL RESPONSES TO PHQ QUESTIONS 1-9: 0
SUM OF ALL RESPONSES TO PHQ QUESTIONS 1-9: 0

## 2024-11-22 NOTE — TELEPHONE ENCOUNTER
Spoke to nurse at the [facility] at [address] regarding instructions Per JACOB Wallace.    Compression stockings daily   Take transitions from sitting to standing slowly  Stay hydrated  Follow up as scheduled    Per nurse she understands these instructions.

## 2024-11-22 NOTE — PROGRESS NOTES
Inspection: Rounded, Soft         Peripheral Vascular  Edema: Right lower extremity, Left lower extremity  RLE Edema: None  LLE Edema: None           Genitourinary  Genitourinary (WDL): Within Defined Limits  Psychosocial  Psychosocial (WDL): Within Defined Limits              Pulse: 73           LAB DATA:  BMP:  Sodium (mmol/L)   Date Value   11/22/2024 135   10/22/2024 131 (L)   10/16/2024 134     Potassium (mmol/L)   Date Value   11/22/2024 4.5   10/22/2024 4.6   10/16/2024 4.6     Potassium reflex Magnesium (mmol/L)   Date Value   07/12/2023 4.9   07/11/2023 4.8   07/10/2023 4.8     Chloride (mmol/L)   Date Value   11/22/2024 97 (L)   10/22/2024 94 (L)   10/16/2024 95 (L)     CO2 (mmol/L)   Date Value   11/22/2024 24   10/22/2024 27   10/16/2024 24     BUN (mg/dL)   Date Value   11/22/2024 26 (H)   10/22/2024 27 (H)   10/16/2024 32 (H)     Creatinine (mg/dL)   Date Value   11/22/2024 1.4 (H)   10/22/2024 1.4 (H)   10/16/2024 1.5 (H)     Glucose (mg/dL)   Date Value   11/22/2024 323 (H)   10/22/2024 426 (H)   10/16/2024 352 (H)   06/24/2011 123 (H)   06/22/2011 129 (H)   06/16/2011 120 (H)     Calcium (mg/dL)   Date Value   11/22/2024 9.1   10/22/2024 8.8   10/16/2024 9.4     BNP:  NT Pro-BNP (pg/mL)   Date Value   11/22/2024 2,834 (H)   10/22/2024 2,766 (H)   10/16/2024 2,746 (H)      CBC:  WBC (E9/L)   Date Value   07/12/2023 8.6     Hemoglobin (g/dL)   Date Value   07/12/2023 14.4     Hematocrit (%)   Date Value   07/12/2023 43.8     Platelets (E9/L)   Date Value   07/12/2023 196     Iron Studies:  Ferritin (ng/mL)   Date Value   07/31/2017 122     Iron (mcg/mL)   Date Value   07/31/2017 23 (L)     TIBC (mcg/dL)   Date Value   07/31/2017 285     Hepatic:  AST (U/L)   Date Value   07/12/2023 19     ALT (U/L)   Date Value   07/12/2023 12     Total Bilirubin (mg/dL)   Date Value   07/12/2023 1.2     Alkaline Phosphatase (U/L)   Date Value   07/12/2023 72     INR:  INR (no units)   Date Value   06/13/2022 1.2

## 2024-11-22 NOTE — TELEPHONE ENCOUNTER
----- Message from GALLITO Wallace CNP sent at 11/22/2024 10:24 AM EST -----  Compression stockings daily   Take transitions from sitting to standing slowly  Stay hydrated  Follow up as scheduled  ----- Message -----  From: Cindy Mack RN  Sent: 11/22/2024  10:16 AM EST  To: GALLITO Galarza CNP    Euvolemic on exam.

## 2024-12-23 ENCOUNTER — TELEPHONE (OUTPATIENT)
Dept: CARDIOLOGY CLINIC | Age: 88
End: 2024-12-23

## 2025-01-13 ENCOUNTER — OFFICE VISIT (OUTPATIENT)
Dept: CARDIOLOGY CLINIC | Age: 89
End: 2025-01-13
Payer: MEDICARE

## 2025-01-13 VITALS
HEART RATE: 81 BPM | HEIGHT: 72 IN | WEIGHT: 227.8 LBS | RESPIRATION RATE: 18 BRPM | DIASTOLIC BLOOD PRESSURE: 70 MMHG | BODY MASS INDEX: 30.85 KG/M2 | SYSTOLIC BLOOD PRESSURE: 113 MMHG

## 2025-01-13 DIAGNOSIS — I25.10 CORONARY ARTERY DISEASE INVOLVING NATIVE CORONARY ARTERY OF NATIVE HEART WITHOUT ANGINA PECTORIS: Primary | Chronic | ICD-10-CM

## 2025-01-13 DIAGNOSIS — I50.20 HFREF (HEART FAILURE WITH REDUCED EJECTION FRACTION) (HCC): ICD-10-CM

## 2025-01-13 DIAGNOSIS — I48.19 PERSISTENT ATRIAL FIBRILLATION (HCC): ICD-10-CM

## 2025-01-13 PROCEDURE — 99214 OFFICE O/P EST MOD 30 MIN: CPT | Performed by: INTERNAL MEDICINE

## 2025-01-13 PROCEDURE — 93000 ELECTROCARDIOGRAM COMPLETE: CPT | Performed by: INTERNAL MEDICINE

## 2025-01-13 PROCEDURE — 1159F MED LIST DOCD IN RCRD: CPT | Performed by: INTERNAL MEDICINE

## 2025-01-13 PROCEDURE — 1123F ACP DISCUSS/DSCN MKR DOCD: CPT | Performed by: INTERNAL MEDICINE

## 2025-01-13 PROCEDURE — 1160F RVW MEDS BY RX/DR IN RCRD: CPT | Performed by: INTERNAL MEDICINE

## 2025-01-13 NOTE — PROGRESS NOTES
Subjective:      Patient ID: Marianne Rebolledo is a 89 y.o. male.        Chief Complaint   Patient presents with    Atrial Fibrillation    Congestive Heart Failure    Coronary Artery Disease       Patient Active Problem List    Diagnosis Date Noted    HFrEF (heart failure with reduced ejection fraction) ([facility]) 12/01/2022     Overview Note:     A. 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  Maze and atriclip  B.  Negative cardionet 2019  C. DCCV 12/16/2022  D. Recurrent AF noted 4/11/2023      History of DVT (deep vein thrombosis) 01/23/2017    Diabetes ([facility]) 05/31/2013    Coronary artery disease involving native coronary artery of native heart 05/31/2013     Overview Note:     A.  PCI 2003 - PTCA of LAD 2004 - [place]  B. ACB 7/24/2017:  LIMA-LAD,CRYOVEIN TO PL., CIRCUMFLEX WITH HIGH GRADE LESION AFTER HUGE OM1, OM2 NOT GRAFTED         Current Outpatient Medications   Medication Sig Dispense Refill    polyvinyl alcohol (LIQUIFILM TEARS) 1.4 % ophthalmic solution Place 1 drop into both eyes in the morning, at noon, and at bedtime      loratadine (CLARITIN) 10 MG tablet Take 1 tablet by mouth daily as needed      furosemide (LASIX) 20 MG tablet Take 1 tablet by mouth daily 60 tablet 3    metoprolol succinate (TOPROL XL) 25 MG extended release tablet Take 1 tablet by mouth in the morning and at bedtime 60 tablet 3    SITagliptin (JANUVIA) 50 MG tablet Take 1 tablet by mouth daily      magnesium oxide (MAG-OX) 400 (240 Mg) MG tablet Take 1 tablet by mouth daily 30 tablet 1    apixaban (ELIQUIS) 5 MG TABS tablet Take 1 tablet by mouth 2 times daily      polyethyl glycol-propyl glycol 0.4-0.3 % (SYSTANE) 0.4-0.3 % ophthalmic solution 1

## 2025-01-17 ENCOUNTER — TELEPHONE (OUTPATIENT)
Dept: OTHER | Age: 89
End: 2025-01-17

## 2025-01-17 NOTE — TELEPHONE ENCOUNTER
9:11 AM  Patient's daughter called to cancel next week's appointment due to forcast of freezing temperatures.     Rescheduled for:    Future Appointments   Date Time Provider Department Center   2/4/2025 10:15 AM [facility] ROOM 2 [facility]   10/13/2025 10:00 AM Devonte Jones MD [facility]

## 2025-02-04 ENCOUNTER — HOSPITAL ENCOUNTER (OUTPATIENT)
Dept: OTHER | Age: 89
Setting detail: THERAPIES SERIES
Discharge: HOME | End: 2025-02-04
Payer: MEDICARE

## 2025-02-04 VITALS
RESPIRATION RATE: 16 BRPM | BODY MASS INDEX: 30.54 KG/M2 | WEIGHT: 225.2 LBS | OXYGEN SATURATION: 94 % | DIASTOLIC BLOOD PRESSURE: 65 MMHG | HEART RATE: 85 BPM | SYSTOLIC BLOOD PRESSURE: 100 MMHG

## 2025-02-04 LAB
ANION GAP SERPL CALCULATED.3IONS-SCNC: 14 MMOL/L (ref 7–16)
BNP SERPL-MCNC: 3566 PG/ML (ref 0–450)
BUN SERPL-MCNC: 24 MG/DL (ref 6–23)
CALCIUM SERPL-MCNC: 8.9 MG/DL (ref 8.6–10.2)
CHLORIDE SERPL-SCNC: 98 MMOL/L (ref 98–107)
CO2 SERPL-SCNC: 23 MMOL/L (ref 22–29)
CREAT SERPL-MCNC: 1.5 MG/DL (ref 0.7–1.2)
GFR, ESTIMATED: 45 ML/MIN/1.73M2
GLUCOSE SERPL-MCNC: 261 MG/DL (ref 74–99)
POTASSIUM SERPL-SCNC: 4.6 MMOL/L (ref 3.5–5)
SODIUM SERPL-SCNC: 135 MMOL/L (ref 132–146)

## 2025-02-04 PROCEDURE — 83880 ASSAY OF NATRIURETIC PEPTIDE: CPT

## 2025-02-04 PROCEDURE — 99214 OFFICE O/P EST MOD 30 MIN: CPT

## 2025-02-04 PROCEDURE — 80048 BASIC METABOLIC PNL TOTAL CA: CPT

## 2025-02-04 PROCEDURE — 36415 COLL VENOUS BLD VENIPUNCTURE: CPT

## 2025-02-04 RX ORDER — ACETAMINOPHEN 325 MG/1
650 TABLET ORAL EVERY 6 HOURS PRN
COMMUNITY

## 2025-02-04 NOTE — PROGRESS NOTES
Congestive Heart Failure Clinic   [facility]      Referring Provider: Dr. Johnson  Primary Care Physician: Michael Choi MD   Cardiologist: Dr. Jones  Nephrologist:       HISTORY OF PRESENT ILLNESS:     Marianne Rebolledo is a 89 y.o. (8/23/1934) male with a history of HFrEF, most recent EF:  Lab Results   Component Value Date    LVEF 39 07/06/2023    LVEFMODE Echo 07/05/2023    LVEFMODE Echo 07/05/2023     He presents to the CHF clinic for ongoing evaluation and monitoring of heart failure.    In the CHF clinic today he denies any adverse symptoms except:  [x] Dizziness or lightheadedness - with position change, unchanged  [] Syncope or near syncope  [] Recent Fall  [] Shortness of breath at rest   [x] Dyspnea with exertion recovers with rest  [] Decline in functional capacity (unable to perform activities they had previously been able to do)  [] Fatigue   [] Orthopnea  [] PND  [] Nocturnal cough  [] Hemoptysis  [] Chest pain, pressure, or discomfort  [] Palpitations  [] Abdominal distention  [] Abdominal bloating  [] Early satiety  [] Blood in stool   [] Diarrhea  [] Constipation  [] Nausea/Vomiting  [] Decreased urinary response to oral diuretic   [] Scrotal swelling   [] Lower extremity edema   [] Used PRN doses of oral diuretic   [] Weight gain    Wt Readings from Last 3 Encounters:   02/04/25 102.2 kg (225 lb 3.2 oz)   01/13/25 103.3 kg (227 lb 12.8 oz)   11/22/24 103.4 kg (228 lb)       SOCIAL HISTORY:  [x] Denies tobacco, alcohol or illicit drug abuse  [] Tobacco use:  [] ETOH use:  [] Illicit drug use:      MEDICATIONS:    No Known Allergies    Current Outpatient Medications:     acetaminophen (TYLENOL) 325 MG tablet, Take 2 tablets by mouth every 6 hours as needed for Pain, Disp: , Rfl:     polyvinyl alcohol (LIQUIFILM TEARS) 1.4 % ophthalmic solution, Place 1 drop into both eyes in the morning, at noon, and at bedtime, Disp: , Rfl:     loratadine (CLARITIN) 10

## 2025-04-30 ENCOUNTER — TELEPHONE (OUTPATIENT)
Dept: OTHER | Age: 89
End: 2025-04-30